# Patient Record
Sex: MALE | Race: WHITE | Employment: UNEMPLOYED | URBAN - METROPOLITAN AREA
[De-identification: names, ages, dates, MRNs, and addresses within clinical notes are randomized per-mention and may not be internally consistent; named-entity substitution may affect disease eponyms.]

---

## 2018-12-16 PROBLEM — E11.00 HYPEROSMOLAR NON-KETOTIC STATE IN PATIENT WITH TYPE 2 DIABETES MELLITUS (HCC): Status: ACTIVE | Noted: 2018-12-16

## 2019-05-05 PROBLEM — E11.65 HYPERGLYCEMIA DUE TO TYPE 2 DIABETES MELLITUS (HCC): Status: ACTIVE | Noted: 2019-05-05

## 2021-11-25 PROBLEM — R11.2 INTRACTABLE NAUSEA AND VOMITING: Status: ACTIVE | Noted: 2021-11-25

## 2021-11-25 PROBLEM — N17.9 AKI (ACUTE KIDNEY INJURY) (HCC): Status: ACTIVE | Noted: 2021-11-25

## 2021-11-25 PROBLEM — E16.2 HYPOGLYCEMIA: Status: ACTIVE | Noted: 2021-11-25

## 2021-11-25 PROBLEM — E87.20 METABOLIC ACIDEMIA: Status: ACTIVE | Noted: 2021-11-25

## 2021-12-24 ENCOUNTER — APPOINTMENT (OUTPATIENT)
Dept: GENERAL RADIOLOGY | Age: 56
DRG: 871 | End: 2021-12-24
Attending: EMERGENCY MEDICINE
Payer: SELF-PAY

## 2021-12-24 ENCOUNTER — APPOINTMENT (OUTPATIENT)
Dept: CT IMAGING | Age: 56
DRG: 871 | End: 2021-12-24
Attending: EMERGENCY MEDICINE
Payer: SELF-PAY

## 2021-12-24 ENCOUNTER — HOSPITAL ENCOUNTER (INPATIENT)
Age: 56
LOS: 4 days | Discharge: ANOTHER HOSPITAL | DRG: 871 | End: 2021-12-28
Attending: EMERGENCY MEDICINE | Admitting: EMERGENCY MEDICINE
Payer: SELF-PAY

## 2021-12-24 DIAGNOSIS — R41.82 ALTERED MENTAL STATUS, UNSPECIFIED ALTERED MENTAL STATUS TYPE: Primary | ICD-10-CM

## 2021-12-24 DIAGNOSIS — N18.6 ESRD ON DIALYSIS (HCC): ICD-10-CM

## 2021-12-24 DIAGNOSIS — E16.2 HYPOGLYCEMIA: ICD-10-CM

## 2021-12-24 DIAGNOSIS — R11.2 NAUSEA AND VOMITING IN ADULT PATIENT: ICD-10-CM

## 2021-12-24 DIAGNOSIS — Z99.2 ESRD ON DIALYSIS (HCC): ICD-10-CM

## 2021-12-24 PROBLEM — G93.41 ACUTE METABOLIC ENCEPHALOPATHY: Status: ACTIVE | Noted: 2021-12-24

## 2021-12-24 LAB
ALBUMIN SERPL-MCNC: 3.6 G/DL (ref 3.4–5)
ALBUMIN/GLOB SERPL: 1.2 {RATIO} (ref 0.8–1.7)
ALP SERPL-CCNC: 60 U/L (ref 45–117)
ALT SERPL-CCNC: 15 U/L (ref 16–61)
AMPHET UR QL SCN: NEGATIVE
AMPHET UR QL SCN: NEGATIVE
ANION GAP SERPL CALC-SCNC: 7 MMOL/L (ref 3–18)
APPEARANCE UR: CLEAR
ARTERIAL PATENCY WRIST A: POSITIVE
AST SERPL-CCNC: 11 U/L (ref 10–38)
ATRIAL RATE: 0 BPM
BARBITURATES UR QL SCN: NEGATIVE
BARBITURATES UR QL SCN: NEGATIVE
BASE EXCESS BLD CALC-SCNC: 3.7 MMOL/L
BASOPHILS # BLD: 0 K/UL (ref 0–0.1)
BASOPHILS NFR BLD: 1 % (ref 0–2)
BDY SITE: ABNORMAL
BENZODIAZ UR QL: NEGATIVE
BENZODIAZ UR QL: NEGATIVE
BILIRUB SERPL-MCNC: 0.5 MG/DL (ref 0.2–1)
BILIRUB UR QL: NEGATIVE
BUN SERPL-MCNC: 33 MG/DL (ref 7–18)
BUN/CREAT SERPL: 7 (ref 12–20)
CALCIUM SERPL-MCNC: 8.2 MG/DL (ref 8.5–10.1)
CALCULATED R AXIS, ECG10: 0 DEGREES
CALCULATED T AXIS, ECG11: 0 DEGREES
CANNABINOIDS UR QL SCN: NEGATIVE
CANNABINOIDS UR QL SCN: NEGATIVE
CHLORIDE SERPL-SCNC: 98 MMOL/L (ref 100–111)
CK SERPL-CCNC: 34 U/L (ref 39–308)
CO2 SERPL-SCNC: 30 MMOL/L (ref 21–32)
COCAINE UR QL SCN: NEGATIVE
COCAINE UR QL SCN: NEGATIVE
COLOR UR: YELLOW
COVID-19 RAPID TEST, COVR: NOT DETECTED
CREAT SERPL-MCNC: 4.61 MG/DL (ref 0.6–1.3)
DIAGNOSIS, 93000: NORMAL
DIFFERENTIAL METHOD BLD: ABNORMAL
EOSINOPHIL # BLD: 0.1 K/UL (ref 0–0.4)
EOSINOPHIL NFR BLD: 1 % (ref 0–5)
EPITH CASTS URNS QL MICRO: NORMAL /LPF (ref 0–5)
ERYTHROCYTE [DISTWIDTH] IN BLOOD BY AUTOMATED COUNT: 15.2 % (ref 11.6–14.5)
GAS FLOW.O2 O2 DELIVERY SYS: ABNORMAL L/MIN
GAS FLOW.O2 SETTING OXYMISER: 18 BPM
GLOBULIN SER CALC-MCNC: 3.1 G/DL (ref 2–4)
GLUCOSE BLD STRIP.AUTO-MCNC: 130 MG/DL (ref 70–110)
GLUCOSE SERPL-MCNC: 149 MG/DL (ref 74–99)
GLUCOSE UR STRIP.AUTO-MCNC: 250 MG/DL
HCO3 BLD-SCNC: 28.3 MMOL/L (ref 22–26)
HCT VFR BLD AUTO: 32.2 % (ref 36–48)
HDSCOM,HDSCOM: NORMAL
HDSCOM,HDSCOM: NORMAL
HGB BLD-MCNC: 10.6 G/DL (ref 13–16)
HGB UR QL STRIP: NEGATIVE
IMM GRANULOCYTES # BLD AUTO: 0.1 K/UL (ref 0–0.04)
IMM GRANULOCYTES NFR BLD AUTO: 1 % (ref 0–0.5)
KETONES UR QL STRIP.AUTO: NEGATIVE MG/DL
LACTATE BLD-SCNC: 1.41 MMOL/L (ref 0.4–2)
LACTATE BLD-SCNC: 1.66 MMOL/L (ref 0.4–2)
LEUKOCYTE ESTERASE UR QL STRIP.AUTO: NEGATIVE
LYMPHOCYTES # BLD: 0.8 K/UL (ref 0.9–3.6)
LYMPHOCYTES NFR BLD: 9 % (ref 21–52)
MCH RBC QN AUTO: 30.7 PG (ref 24–34)
MCHC RBC AUTO-ENTMCNC: 32.9 G/DL (ref 31–37)
MCV RBC AUTO: 93.3 FL (ref 78–100)
METHADONE UR QL: NEGATIVE
METHADONE UR QL: NEGATIVE
MONOCYTES # BLD: 0.9 K/UL (ref 0.05–1.2)
MONOCYTES NFR BLD: 11 % (ref 3–10)
NEUTS SEG # BLD: 7 K/UL (ref 1.8–8)
NEUTS SEG NFR BLD: 79 % (ref 40–73)
NITRITE UR QL STRIP.AUTO: NEGATIVE
NRBC # BLD: 0 K/UL (ref 0–0.01)
NRBC BLD-RTO: 0 PER 100 WBC
O2/TOTAL GAS SETTING VFR VENT: 21 %
OPIATES UR QL: NEGATIVE
OPIATES UR QL: NEGATIVE
PCO2 BLD: 41.9 MMHG (ref 35–45)
PCP UR QL: NEGATIVE
PCP UR QL: NEGATIVE
PH BLD: 7.44 [PH] (ref 7.35–7.45)
PH UR STRIP: 7.5 [PH] (ref 5–8)
PLATELET # BLD AUTO: 98 K/UL (ref 135–420)
PMV BLD AUTO: 10.5 FL (ref 9.2–11.8)
PO2 BLD: 63 MMHG (ref 80–100)
POTASSIUM SERPL-SCNC: 4 MMOL/L (ref 3.5–5.5)
PROT SERPL-MCNC: 6.7 G/DL (ref 6.4–8.2)
PROT UR STRIP-MCNC: ABNORMAL MG/DL
Q-T INTERVAL, ECG07: 0 MS
QRS DURATION, ECG06: 0 MS
QTC CALCULATION (BEZET), ECG08: 0 MS
RBC # BLD AUTO: 3.45 M/UL (ref 4.35–5.65)
SAO2 % BLD: 92.3 % (ref 92–97)
SERVICE CMNT-IMP: ABNORMAL
SODIUM SERPL-SCNC: 135 MMOL/L (ref 136–145)
SOURCE, COVRS: NORMAL
SP GR UR REFRACTOMETRY: 1.01 (ref 1–1.03)
SPECIMEN TYPE: ABNORMAL
TROPONIN-HIGH SENSITIVITY: 20 NG/L (ref 0–78)
UROBILINOGEN UR QL STRIP.AUTO: 0.2 EU/DL (ref 0.2–1)
VENTRICULAR RATE, ECG03: 0 BPM
WBC # BLD AUTO: 8.9 K/UL (ref 4.6–13.2)
WBC URNS QL MICRO: NORMAL /HPF (ref 0–5)

## 2021-12-24 PROCEDURE — 74011250636 HC RX REV CODE- 250/636: Performed by: EMERGENCY MEDICINE

## 2021-12-24 PROCEDURE — 74011250637 HC RX REV CODE- 250/637: Performed by: EMERGENCY MEDICINE

## 2021-12-24 PROCEDURE — 82962 GLUCOSE BLOOD TEST: CPT

## 2021-12-24 PROCEDURE — 87086 URINE CULTURE/COLONY COUNT: CPT

## 2021-12-24 PROCEDURE — 74176 CT ABD & PELVIS W/O CONTRAST: CPT

## 2021-12-24 PROCEDURE — 99284 EMERGENCY DEPT VISIT MOD MDM: CPT

## 2021-12-24 PROCEDURE — 96365 THER/PROPH/DIAG IV INF INIT: CPT

## 2021-12-24 PROCEDURE — 70450 CT HEAD/BRAIN W/O DYE: CPT

## 2021-12-24 PROCEDURE — 87635 SARS-COV-2 COVID-19 AMP PRB: CPT

## 2021-12-24 PROCEDURE — 87040 BLOOD CULTURE FOR BACTERIA: CPT

## 2021-12-24 PROCEDURE — 83605 ASSAY OF LACTIC ACID: CPT

## 2021-12-24 PROCEDURE — 85025 COMPLETE CBC W/AUTO DIFF WBC: CPT

## 2021-12-24 PROCEDURE — 84484 ASSAY OF TROPONIN QUANT: CPT

## 2021-12-24 PROCEDURE — 96376 TX/PRO/DX INJ SAME DRUG ADON: CPT

## 2021-12-24 PROCEDURE — 82803 BLOOD GASES ANY COMBINATION: CPT

## 2021-12-24 PROCEDURE — 74011250636 HC RX REV CODE- 250/636

## 2021-12-24 PROCEDURE — 65660000000 HC RM CCU STEPDOWN

## 2021-12-24 PROCEDURE — 36600 WITHDRAWAL OF ARTERIAL BLOOD: CPT

## 2021-12-24 PROCEDURE — 82550 ASSAY OF CK (CPK): CPT

## 2021-12-24 PROCEDURE — 96374 THER/PROPH/DIAG INJ IV PUSH: CPT

## 2021-12-24 PROCEDURE — 71045 X-RAY EXAM CHEST 1 VIEW: CPT

## 2021-12-24 PROCEDURE — 80053 COMPREHEN METABOLIC PANEL: CPT

## 2021-12-24 PROCEDURE — 80307 DRUG TEST PRSMV CHEM ANLYZR: CPT

## 2021-12-24 PROCEDURE — 51702 INSERT TEMP BLADDER CATH: CPT

## 2021-12-24 PROCEDURE — 80177 DRUG SCRN QUAN LEVETIRACETAM: CPT

## 2021-12-24 PROCEDURE — 81001 URINALYSIS AUTO W/SCOPE: CPT

## 2021-12-24 PROCEDURE — 74011000258 HC RX REV CODE- 258: Performed by: EMERGENCY MEDICINE

## 2021-12-24 PROCEDURE — 96375 TX/PRO/DX INJ NEW DRUG ADDON: CPT

## 2021-12-24 PROCEDURE — 99223 1ST HOSP IP/OBS HIGH 75: CPT | Performed by: EMERGENCY MEDICINE

## 2021-12-24 PROCEDURE — APPSS60 APP SPLIT SHARED TIME 46-60 MINUTES: Performed by: NURSE PRACTITIONER

## 2021-12-24 RX ORDER — CEFEPIME HYDROCHLORIDE 2 G/1
2 INJECTION, POWDER, FOR SOLUTION INTRAVENOUS EVERY 24 HOURS
Status: DISCONTINUED | OUTPATIENT
Start: 2021-12-24 | End: 2021-12-24

## 2021-12-24 RX ORDER — SODIUM CHLORIDE 0.9 % (FLUSH) 0.9 %
5-40 SYRINGE (ML) INJECTION AS NEEDED
Status: DISCONTINUED | OUTPATIENT
Start: 2021-12-24 | End: 2021-12-28 | Stop reason: HOSPADM

## 2021-12-24 RX ORDER — INSULIN GLARGINE 100 [IU]/ML
10 INJECTION, SOLUTION SUBCUTANEOUS
Status: DISCONTINUED | OUTPATIENT
Start: 2021-12-24 | End: 2021-12-24

## 2021-12-24 RX ORDER — ACETAMINOPHEN 650 MG/1
650 SUPPOSITORY RECTAL
Status: DISCONTINUED | OUTPATIENT
Start: 2021-12-24 | End: 2021-12-28 | Stop reason: HOSPADM

## 2021-12-24 RX ORDER — POLYETHYLENE GLYCOL 3350 17 G/17G
17 POWDER, FOR SOLUTION ORAL DAILY PRN
Status: DISCONTINUED | OUTPATIENT
Start: 2021-12-24 | End: 2021-12-28 | Stop reason: HOSPADM

## 2021-12-24 RX ORDER — ONDANSETRON 2 MG/ML
INJECTION INTRAMUSCULAR; INTRAVENOUS
Status: COMPLETED
Start: 2021-12-24 | End: 2021-12-24

## 2021-12-24 RX ORDER — FAMOTIDINE 20 MG/1
20 TABLET, FILM COATED ORAL DAILY
Status: DISCONTINUED | OUTPATIENT
Start: 2021-12-25 | End: 2021-12-28 | Stop reason: HOSPADM

## 2021-12-24 RX ORDER — ONDANSETRON 4 MG/1
4 TABLET, ORALLY DISINTEGRATING ORAL
Status: DISCONTINUED | OUTPATIENT
Start: 2021-12-24 | End: 2021-12-28 | Stop reason: HOSPADM

## 2021-12-24 RX ORDER — INSULIN LISPRO 100 [IU]/ML
INJECTION, SOLUTION INTRAVENOUS; SUBCUTANEOUS EVERY 6 HOURS
Status: DISCONTINUED | OUTPATIENT
Start: 2021-12-25 | End: 2021-12-28 | Stop reason: HOSPADM

## 2021-12-24 RX ORDER — LORAZEPAM 2 MG/ML
1 INJECTION INTRAMUSCULAR
Status: COMPLETED | OUTPATIENT
Start: 2021-12-24 | End: 2021-12-24

## 2021-12-24 RX ORDER — ONDANSETRON 2 MG/ML
4 INJECTION INTRAMUSCULAR; INTRAVENOUS
Status: DISCONTINUED | OUTPATIENT
Start: 2021-12-24 | End: 2021-12-28 | Stop reason: HOSPADM

## 2021-12-24 RX ORDER — INSULIN GLARGINE 100 [IU]/ML
8 INJECTION, SOLUTION SUBCUTANEOUS
Status: DISCONTINUED | OUTPATIENT
Start: 2021-12-24 | End: 2021-12-24

## 2021-12-24 RX ORDER — DEXTROSE 50 % IN WATER (D50W) INTRAVENOUS SYRINGE
25-50 AS NEEDED
Status: DISCONTINUED | OUTPATIENT
Start: 2021-12-24 | End: 2021-12-28 | Stop reason: HOSPADM

## 2021-12-24 RX ORDER — SODIUM CHLORIDE 0.9 % (FLUSH) 0.9 %
5-10 SYRINGE (ML) INJECTION AS NEEDED
Status: DISCONTINUED | OUTPATIENT
Start: 2021-12-24 | End: 2021-12-28 | Stop reason: HOSPADM

## 2021-12-24 RX ORDER — LEVOFLOXACIN 5 MG/ML
750 INJECTION, SOLUTION INTRAVENOUS EVERY 24 HOURS
Status: DISCONTINUED | OUTPATIENT
Start: 2021-12-24 | End: 2021-12-24

## 2021-12-24 RX ORDER — HEPARIN SODIUM 5000 [USP'U]/ML
5000 INJECTION, SOLUTION INTRAVENOUS; SUBCUTANEOUS EVERY 8 HOURS
Status: DISCONTINUED | OUTPATIENT
Start: 2021-12-24 | End: 2021-12-28 | Stop reason: HOSPADM

## 2021-12-24 RX ORDER — INSULIN GLARGINE 100 [IU]/ML
5 INJECTION, SOLUTION SUBCUTANEOUS
Status: DISCONTINUED | OUTPATIENT
Start: 2021-12-25 | End: 2021-12-25

## 2021-12-24 RX ORDER — MAGNESIUM SULFATE 100 %
4 CRYSTALS MISCELLANEOUS AS NEEDED
Status: DISCONTINUED | OUTPATIENT
Start: 2021-12-24 | End: 2021-12-28 | Stop reason: HOSPADM

## 2021-12-24 RX ORDER — INSULIN LISPRO 100 [IU]/ML
INJECTION, SOLUTION INTRAVENOUS; SUBCUTANEOUS
Status: DISCONTINUED | OUTPATIENT
Start: 2021-12-24 | End: 2021-12-24

## 2021-12-24 RX ORDER — SODIUM CHLORIDE 0.9 % (FLUSH) 0.9 %
5-40 SYRINGE (ML) INJECTION EVERY 8 HOURS
Status: DISCONTINUED | OUTPATIENT
Start: 2021-12-24 | End: 2021-12-28 | Stop reason: HOSPADM

## 2021-12-24 RX ORDER — HYDRALAZINE HYDROCHLORIDE 20 MG/ML
10 INJECTION INTRAMUSCULAR; INTRAVENOUS
Status: DISCONTINUED | OUTPATIENT
Start: 2021-12-24 | End: 2021-12-28 | Stop reason: HOSPADM

## 2021-12-24 RX ORDER — VANCOMYCIN 1.75 GRAM/500 ML IN 0.9 % SODIUM CHLORIDE INTRAVENOUS
1750 ONCE
Status: COMPLETED | OUTPATIENT
Start: 2021-12-24 | End: 2021-12-25

## 2021-12-24 RX ORDER — INSULIN GLARGINE 100 [IU]/ML
12 INJECTION, SOLUTION SUBCUTANEOUS
Status: DISCONTINUED | OUTPATIENT
Start: 2021-12-24 | End: 2021-12-24

## 2021-12-24 RX ORDER — ACETAMINOPHEN 325 MG/1
650 TABLET ORAL
Status: DISCONTINUED | OUTPATIENT
Start: 2021-12-24 | End: 2021-12-28 | Stop reason: HOSPADM

## 2021-12-24 RX ADMIN — ACETAMINOPHEN 975 MG: 325 SUPPOSITORY RECTAL at 18:35

## 2021-12-24 RX ADMIN — LEVETIRACETAM 500 MG: 100 INJECTION, SOLUTION INTRAVENOUS at 15:34

## 2021-12-24 RX ADMIN — CEFEPIME HYDROCHLORIDE 2 G: 2 INJECTION, POWDER, FOR SOLUTION INTRAVENOUS at 18:11

## 2021-12-24 RX ADMIN — LORAZEPAM 1 MG: 2 INJECTION INTRAMUSCULAR; INTRAVENOUS at 16:47

## 2021-12-24 RX ADMIN — ONDANSETRON 4 MG: 2 INJECTION INTRAMUSCULAR; INTRAVENOUS at 15:34

## 2021-12-24 RX ADMIN — VANCOMYCIN HYDROCHLORIDE 1750 MG: 10 INJECTION, POWDER, LYOPHILIZED, FOR SOLUTION INTRAVENOUS at 20:24
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
## 2021-12-24 NOTE — ED NOTES
All care/ triage/ assessments/ meds were delayed today due to pt being hard to wrangle/ calm and/or sit still. Pt FINALLY able to finish triage (with assistance and medication).

## 2021-12-24 NOTE — ED TRIAGE NOTES
Pt arrives via EMS from Dialysis, it was reported that pt had low Blood glucose/ nausea/ and vomiting. Accompanying officers also report a change in mental status.

## 2021-12-24 NOTE — ED NOTES
Pt not allowing nurse to perform EKG or start PIV; despite help of police officers that accompanied him. Informed REHANA Galdamez whom was able to start an EJ line. Labs collected and sent.

## 2021-12-25 PROBLEM — N12: Status: ACTIVE | Noted: 2021-12-25

## 2021-12-25 PROBLEM — I10 HYPERTENSION: Status: ACTIVE | Noted: 2021-12-25

## 2021-12-25 PROBLEM — A41.9 SEPSIS (HCC): Status: ACTIVE | Noted: 2021-12-25

## 2021-12-25 PROBLEM — N17.0 ATN (ACUTE TUBULAR NECROSIS) (HCC): Status: ACTIVE | Noted: 2021-12-25

## 2021-12-25 PROBLEM — D69.6 THROMBOCYTOPENIA (HCC): Status: ACTIVE | Noted: 2021-12-25

## 2021-12-25 LAB
ALBUMIN SERPL-MCNC: 3.4 G/DL (ref 3.4–5)
ALBUMIN/GLOB SERPL: 1.1 {RATIO} (ref 0.8–1.7)
ALP SERPL-CCNC: 58 U/L (ref 45–117)
ALT SERPL-CCNC: 11 U/L (ref 16–61)
AMMONIA PLAS-SCNC: 15 UMOL/L (ref 11–32)
ANION GAP SERPL CALC-SCNC: 8 MMOL/L (ref 3–18)
APPEARANCE CSF: CLEAR
APTT PPP: 25.6 SEC (ref 23–36.4)
AST SERPL-CCNC: 20 U/L (ref 10–38)
BASOPHILS # BLD: 0 K/UL (ref 0–0.1)
BASOPHILS NFR BLD: 0 % (ref 0–2)
BILIRUB SERPL-MCNC: 0.6 MG/DL (ref 0.2–1)
BUN SERPL-MCNC: 35 MG/DL (ref 7–18)
BUN/CREAT SERPL: 6 (ref 12–20)
CALCIUM SERPL-MCNC: 8.1 MG/DL (ref 8.5–10.1)
CALCIUM SERPL-MCNC: 8.1 MG/DL (ref 8.5–10.1)
CHLORIDE SERPL-SCNC: 100 MMOL/L (ref 100–111)
CO2 SERPL-SCNC: 27 MMOL/L (ref 21–32)
COLOR CSF: COLORLESS
COLOR SPUN CSF: COLORLESS
CREAT SERPL-MCNC: 5.73 MG/DL (ref 0.6–1.3)
DIFFERENTIAL METHOD BLD: ABNORMAL
EOSINOPHIL # BLD: 0 K/UL (ref 0–0.4)
EOSINOPHIL NFR BLD: 0 % (ref 0–5)
ERYTHROCYTE [DISTWIDTH] IN BLOOD BY AUTOMATED COUNT: 15.7 % (ref 11.6–14.5)
GLOBULIN SER CALC-MCNC: 3.2 G/DL (ref 2–4)
GLUCOSE BLD STRIP.AUTO-MCNC: 110 MG/DL (ref 70–110)
GLUCOSE BLD STRIP.AUTO-MCNC: 169 MG/DL (ref 70–110)
GLUCOSE BLD STRIP.AUTO-MCNC: 59 MG/DL (ref 70–110)
GLUCOSE BLD STRIP.AUTO-MCNC: 63 MG/DL (ref 70–110)
GLUCOSE BLD STRIP.AUTO-MCNC: 83 MG/DL (ref 70–110)
GLUCOSE CSF-MCNC: 53 MG/DL (ref 40–70)
GLUCOSE SERPL-MCNC: 76 MG/DL (ref 74–99)
HBV SURFACE AG SER QL: <0.1 INDEX
HBV SURFACE AG SER QL: NEGATIVE
HCT VFR BLD AUTO: 31.3 % (ref 36–48)
HGB BLD-MCNC: 10.2 G/DL (ref 13–16)
IMM GRANULOCYTES # BLD AUTO: 0 K/UL (ref 0–0.04)
IMM GRANULOCYTES NFR BLD AUTO: 0 % (ref 0–0.5)
INR PPP: 1.1 (ref 0.8–1.2)
LYMPHOCYTES # BLD: 0.8 K/UL (ref 0.9–3.6)
LYMPHOCYTES NFR BLD: 8 % (ref 21–52)
MCH RBC QN AUTO: 30.7 PG (ref 24–34)
MCHC RBC AUTO-ENTMCNC: 32.6 G/DL (ref 31–37)
MCV RBC AUTO: 94.3 FL (ref 78–100)
MONOCYTES # BLD: 0.7 K/UL (ref 0.05–1.2)
MONOCYTES NFR BLD: 7 % (ref 3–10)
NEUTS SEG # BLD: 8.4 K/UL (ref 1.8–8)
NEUTS SEG NFR BLD: 84 % (ref 40–73)
NRBC # BLD: 0 K/UL (ref 0–0.01)
NRBC BLD-RTO: 0 PER 100 WBC
PHOSPHATE SERPL-MCNC: 3.3 MG/DL (ref 2.5–4.9)
PLATELET # BLD AUTO: 108 K/UL (ref 135–420)
PMV BLD AUTO: 10.5 FL (ref 9.2–11.8)
POTASSIUM SERPL-SCNC: 4.6 MMOL/L (ref 3.5–5.5)
PROT CSF-MCNC: 53 MG/DL (ref 15–45)
PROT SERPL-MCNC: 6.6 G/DL (ref 6.4–8.2)
PROTHROMBIN TIME: 14.5 SEC (ref 11.5–15.2)
PTH-INTACT SERPL-MCNC: 126.5 PG/ML (ref 18.4–88)
RBC # BLD AUTO: 3.32 M/UL (ref 4.35–5.65)
RBC # CSF: 15 /CU MM
SODIUM SERPL-SCNC: 135 MMOL/L (ref 136–145)
TSH SERPL DL<=0.05 MIU/L-ACNC: 1.55 UIU/ML (ref 0.36–3.74)
TUBE # CSF: 1
TUBE # CSF: 1
TUBE # CSF: 3
VANCOMYCIN SERPL-MCNC: 30.5 UG/ML (ref 5–40)
WBC # BLD AUTO: 10.1 K/UL (ref 4.6–13.2)
WBC # CSF: 3 /CU MM

## 2021-12-25 PROCEDURE — 74011000258 HC RX REV CODE- 258: Performed by: EMERGENCY MEDICINE

## 2021-12-25 PROCEDURE — 99221 1ST HOSP IP/OBS SF/LOW 40: CPT | Performed by: PSYCHIATRY & NEUROLOGY

## 2021-12-25 PROCEDURE — 99233 SBSQ HOSP IP/OBS HIGH 50: CPT | Performed by: HOSPITALIST

## 2021-12-25 PROCEDURE — 84157 ASSAY OF PROTEIN OTHER: CPT

## 2021-12-25 PROCEDURE — 82962 GLUCOSE BLOOD TEST: CPT

## 2021-12-25 PROCEDURE — 86704 HEP B CORE ANTIBODY TOTAL: CPT

## 2021-12-25 PROCEDURE — 89050 BODY FLUID CELL COUNT: CPT

## 2021-12-25 PROCEDURE — 83970 ASSAY OF PARATHORMONE: CPT

## 2021-12-25 PROCEDURE — 86706 HEP B SURFACE ANTIBODY: CPT

## 2021-12-25 PROCEDURE — 85576 BLOOD PLATELET AGGREGATION: CPT

## 2021-12-25 PROCEDURE — 74011250636 HC RX REV CODE- 250/636: Performed by: EMERGENCY MEDICINE

## 2021-12-25 PROCEDURE — 87483 CNS DNA AMP PROBE TYPE 12-25: CPT

## 2021-12-25 PROCEDURE — 85610 PROTHROMBIN TIME: CPT

## 2021-12-25 PROCEDURE — 74011250637 HC RX REV CODE- 250/637: Performed by: NURSE PRACTITIONER

## 2021-12-25 PROCEDURE — 84100 ASSAY OF PHOSPHORUS: CPT

## 2021-12-25 PROCEDURE — 74011250636 HC RX REV CODE- 250/636: Performed by: NURSE PRACTITIONER

## 2021-12-25 PROCEDURE — 87070 CULTURE OTHR SPECIMN AEROBIC: CPT

## 2021-12-25 PROCEDURE — 85025 COMPLETE CBC W/AUTO DIFF WBC: CPT

## 2021-12-25 PROCEDURE — 87040 BLOOD CULTURE FOR BACTERIA: CPT

## 2021-12-25 PROCEDURE — 85730 THROMBOPLASTIN TIME PARTIAL: CPT

## 2021-12-25 PROCEDURE — 94761 N-INVAS EAR/PLS OXIMETRY MLT: CPT

## 2021-12-25 PROCEDURE — 82140 ASSAY OF AMMONIA: CPT

## 2021-12-25 PROCEDURE — 82945 GLUCOSE OTHER FLUID: CPT

## 2021-12-25 PROCEDURE — 90935 HEMODIALYSIS ONE EVALUATION: CPT

## 2021-12-25 PROCEDURE — 74011000250 HC RX REV CODE- 250: Performed by: NURSE PRACTITIONER

## 2021-12-25 PROCEDURE — 62270 DX LMBR SPI PNXR: CPT | Performed by: PSYCHIATRY & NEUROLOGY

## 2021-12-25 PROCEDURE — 009U3ZX DRAINAGE OF SPINAL CANAL, PERCUTANEOUS APPROACH, DIAGNOSTIC: ICD-10-PCS | Performed by: PSYCHIATRY & NEUROLOGY

## 2021-12-25 PROCEDURE — 74011250636 HC RX REV CODE- 250/636: Performed by: HOSPITALIST

## 2021-12-25 PROCEDURE — 74011000250 HC RX REV CODE- 250: Performed by: HOSPITALIST

## 2021-12-25 PROCEDURE — 87529 HSV DNA AMP PROBE: CPT

## 2021-12-25 PROCEDURE — 84443 ASSAY THYROID STIM HORMONE: CPT

## 2021-12-25 PROCEDURE — 65660000004 HC RM CVT STEPDOWN

## 2021-12-25 PROCEDURE — 80053 COMPREHEN METABOLIC PANEL: CPT

## 2021-12-25 PROCEDURE — 5A1D70Z PERFORMANCE OF URINARY FILTRATION, INTERMITTENT, LESS THAN 6 HOURS PER DAY: ICD-10-PCS | Performed by: INTERNAL MEDICINE

## 2021-12-25 PROCEDURE — 74011000258 HC RX REV CODE- 258: Performed by: NURSE PRACTITIONER

## 2021-12-25 PROCEDURE — 87340 HEPATITIS B SURFACE AG IA: CPT

## 2021-12-25 PROCEDURE — 80202 ASSAY OF VANCOMYCIN: CPT

## 2021-12-25 RX ORDER — INSULIN GLARGINE 100 [IU]/ML
3 INJECTION, SOLUTION SUBCUTANEOUS
Status: DISCONTINUED | OUTPATIENT
Start: 2021-12-25 | End: 2021-12-28 | Stop reason: HOSPADM

## 2021-12-25 RX ORDER — LEVOFLOXACIN 5 MG/ML
750 INJECTION, SOLUTION INTRAVENOUS ONCE
Status: COMPLETED | OUTPATIENT
Start: 2021-12-25 | End: 2021-12-25

## 2021-12-25 RX ORDER — LEVOFLOXACIN 5 MG/ML
500 INJECTION, SOLUTION INTRAVENOUS
Status: DISCONTINUED | OUTPATIENT
Start: 2021-12-27 | End: 2021-12-28

## 2021-12-25 RX ADMIN — DEXTROSE MONOHYDRATE 25 G: 25 INJECTION, SOLUTION INTRAVENOUS at 00:51

## 2021-12-25 RX ADMIN — THIAMINE HYDROCHLORIDE 250 MG: 100 INJECTION, SOLUTION INTRAMUSCULAR; INTRAVENOUS at 00:52

## 2021-12-25 RX ADMIN — Medication 10 ML: at 14:00

## 2021-12-25 RX ADMIN — LEVOFLOXACIN 750 MG: 5 INJECTION, SOLUTION INTRAVENOUS at 17:50

## 2021-12-25 RX ADMIN — Medication 10 ML: at 06:00

## 2021-12-25 RX ADMIN — LEVETIRACETAM 500 MG: 100 INJECTION, SOLUTION INTRAVENOUS at 12:05

## 2021-12-25 RX ADMIN — ACETAMINOPHEN 650 MG: 650 SUPPOSITORY RECTAL at 12:08

## 2021-12-25 RX ADMIN — ACETAMINOPHEN 650 MG: 650 SUPPOSITORY RECTAL at 18:37

## 2021-12-25 RX ADMIN — Medication 10 ML: at 00:56

## 2021-12-25 RX ADMIN — WATER 1 G: 1 INJECTION INTRAMUSCULAR; INTRAVENOUS; SUBCUTANEOUS at 17:50

## 2021-12-25 RX ADMIN — THIAMINE HYDROCHLORIDE 250 MG: 100 INJECTION, SOLUTION INTRAMUSCULAR; INTRAVENOUS at 00:55

## 2021-12-25 NOTE — PROGRESS NOTES
Progress Note    Jame Davidson  49 y.o. Admit Date: 12/24/2021  Active Problems:    Hypoglycemia (11/25/2021) POA: Unknown      EUSEBIA (acute kidney injury) ([address] (11/25/2021) POA: Yes      Acute metabolic encephalopathy (17/41/4270) POA: Unknown      ATN (acute tubular necrosis) ([address] (12/25/2021) POA: Unknown      Interstitial nephritis determined by biopsy (12/25/2021) POA: Unknown      Thrombocytopenia ([address] (12/25/2021) POA: Unknown      Hypertension (12/25/2021) POA: Unknown      Sepsis ([address] (12/25/2021) POA: Unknown            Subjective:     Patient remained Unresponsive, opens eyes & falls sleep, moves  Arms & legs, maintains good urine output through Garcia  . Came from [facility] with Altered Mental status, Could not finish Dialysis. Recently started Dialysis at [facility] with Biopsy Proven ATN with NSAIDS induced Interstitial Nephritis. Was treated with 7 day course of Prednisone to see any response to renal function,so far no respond. Since admission at [facility] received 2 dialysis & tolerated well. On 3rd treatment was Hypoglycemic & Hyperglycemic & dialysis was stopped as he had acute altered mental status & was sent to ER for further evaluation. Work up so far uneventful . Having Fever,Thrombocytopenic & BP is quite high       A comprehensive review of systems was negative except for that written in the History of Present Illness.     Objective:     Visit Vitals  BP (!) 180/150   Pulse 85   Temp (!) 102.8 °F (39.3 °C)   Resp 13   Wt 72.5 kg (159 lb 13.3 oz) Comment: as of 12/15/21   SpO2 90%   BMI 29.23 kg/m²         Intake/Output Summary (Last 24 hours) at 12/25/2021 1252  Last data filed at 12/24/2021 1410  Gross per 24 hour   Intake 100 ml   Output    Net 100 ml       Current Facility-Administered Medications   Medication Dose Route Frequency Provider Last Rate Last Admin    VANCOMYCIN INFORMATION NOTE   Other Rx Dosing/Monitoring Shahla Garcia MD        insulin glargine (LANTUS) injection 3 Units  3 Units SubCUTAneous QHS Mark Garcia MD        sodium chloride (NS) flush 5-10 mL  5-10 mL IntraVENous PRN Roya Ace PA        glucose chewable tablet 16 g  4 Tablet Oral PRN Sasha Kingsley NP        glucagon (GLUCAGEN) injection 1 mg  1 mg IntraMUSCular PRN Sasha Kingsley NP        dextrose (D50W) injection syrg 12.5-25 g  25-50 mL IntraVENous PRN Sasha Kingsley NP   25 g at 12/25/21 0051    levETIRAcetam (KEPPRA) 500 mg in 0.9% sodium chloride (MBP/ADV) 100 mL MBP  500 mg IntraVENous Q12H Sasha Kingsley NP   500 mg at 12/25/21 1205    sodium chloride (NS) flush 5-40 mL  5-40 mL IntraVENous Q8H Anne Marie Kingsley NP   10 mL at 12/25/21 0600    sodium chloride (NS) flush 5-40 mL  5-40 mL IntraVENous PRN Sasha Kingsley NP        acetaminophen (TYLENOL) tablet 650 mg  650 mg Oral Q6H PRN Sasha Kingsley NP        Or    acetaminophen (TYLENOL) suppository 650 mg  650 mg Rectal Q6H PRN Sasha Kingsley NP   650 mg at 12/25/21 1208    polyethylene glycol (MIRALAX) packet 17 g  17 g Oral DAILY PRN Sasha Kingsley NP        ondansetron (ZOFRAN ODT) tablet 4 mg  4 mg Oral Q8H PRN Anne Marie Kingsley NP        Or    ondansetron (ZOFRAN) injection 4 mg  4 mg IntraVENous Q6H PRN Anne Marie Kingsley NP        famotidine (PEPCID) tablet 20 mg  20 mg Oral DAILY Sasha Kingsley NP        [Held by provider] heparin (porcine) injection 5,000 Units  5,000 Units SubCUTAneous Q8H Anne Marie Kingsley NP        cefepime (MAXIPIME) 2 g in sterile water (preservative free) 10 mL IV syringe  2 g IntraVENous Q24H Alton Otero MD        insulin lispro (HUMALOG) injection   SubCUTAneous Q6H Terry Laboy MD        thiamine (B-1) 250 mg in 0.9% sodium chloride 50 mL IVPB  250 mg IntraVENous Q12H Terry Laboy  mL/hr at 12/25/21 0055 250 mg at 12/25/21 0055    hydrALAZINE (APRESOLINE) 20 mg/mL injection 10 mg  10 mg IntraVENous Q6H PRN Jeff Castro MD         Current Outpatient Medications   Medication Sig Dispense Refill    atorvastatin (LIPITOR) 10 mg tablet Take 1 Tablet by mouth daily. 30 Tablet 0    insulin glargine (Semglee U-100 Insulin) 100 unit/mL injection 20 Units by SubCUTAneous route nightly. 30 mL 0    levETIRAcetam (KEPPRA) 500 mg tablet Take 1 Tablet by mouth two (2) times a day. 60 Tablet 0    amLODIPine (NORVASC) 5 mg tablet Take 1 Tablet by mouth daily. 30 Tablet 0    b complex-vitamin c-folic acid (NEPHROCAPS) 1 mg capsule Take 1 Capsule by mouth daily. 30 Capsule 0    cyanocobalamin (VITAMIN B12) 2,500 mcg sublingual tablet Take 1 Tablet by mouth daily. 30 Tablet 0    epoetin jeff-epbx (RETACRIT) 10,000 unit/mL injection 1 mL by SubCUTAneous route Every Tues, Thur & Sat. Indications: anemia due to kidney failure 3 mL 0    ferrous sulfate 325 mg (65 mg iron) tablet Take 1 Tablet by mouth daily (with breakfast). 30 Tablet 0    folic acid (FOLVITE) 1 mg tablet Take 1 Tablet by mouth daily. 30 Tablet 0    insulin lispro (HUMALOG) 100 unit/mL injection 10 units before breakfast  12 units before lunch  15 units before dinner 30 Each 0    sevelamer carbonate (RENVELA) 800 mg tab tab Take 2 Tablets by mouth three (3) times daily (with meals). Indications: renal osteodystrophy with hyperphosphatemia 180 Tablet 0    thiamine HCL (B-1) 100 mg tablet Take 1 Tablet by mouth daily. 30 Tablet 0    senna-docusate (PERICOLACE) 8.6-50 mg per tablet Take 2 Tablets by mouth nightly. 60 Tablet 0    pantoprazole (PROTONIX) 40 mg tablet Take 1 Tablet by mouth Daily (before breakfast). 30 Tablet 0    bumetanide (BUMEX) 2 mg tablet Take 1 Tablet by mouth daily. 30 Tablet 0    cloNIDine (CATAPRES) 0.3 mg/24 hr 1 Patch by TransDERmal route every seven (7) days.  4 Patch 0        Physical Exam:     Physical Exam:   General:  Sleepy, unresponsive, opens eyes  & fall sleep. appears stated age. Neck: Supple, symmetrical, trachea midline, no adenopathy, thyroid: no enlargement/tenderness/nodules, no carotid bruit and no JVD. Lungs:   Clear to auscultation bilaterally. Heart:  Regular rate and rhythm, S1, S2 normal, no murmur, click, rub or gallop. Abdomen:   Soft, non-tender. Bowel sounds normal. No masses,  No organomegaly. Extremities: Extremities normal, atraumatic, no cyanosis or edema,Has Right IJ TDC   Skin: Skin color, texture, turgor normal. No rashes or lesions         Data Review:    CBC w/Diff    Recent Labs     12/25/21  0716 12/24/21  1450 12/24/21  1450   WBC 10.1  --  8.9   RBC 3.32*  --  3.45*   HGB 10.2*  --  10.6*   HCT 31.3*  --  32.2*   MCV 94.3   < > 93.3   MCH 30.7   < > 30.7   MCHC 32.6   < > 32.9   RDW 15.7*   < > 15.2*    < > = values in this interval not displayed. Recent Labs     12/25/21  0716 12/24/21  1450 12/24/21  1450   MONOS 7  --  11*   EOS 0  --  1   BASOS 0   < > 1   RDW 15.7*   < > 15.2*    < > = values in this interval not displayed.         Comprehensive Metabolic Profile    Recent Labs     12/25/21  0611 12/24/21  1450   * 135*   K 4.6 4.0    98*   CO2 27 30   BUN 35* 33*   CREA 5.73* 4.61*    Recent Labs     12/25/21  0611 12/24/21  1450   CA 8.1*  8.1* 8.2*   PHOS 3.3  --    ALB 3.4 3.6   TP 6.6 6.7   TBILI 0.6 0.5                        Impression:       Active Hospital Problems    Diagnosis Date Noted    ATN (acute tubular necrosis) ([facility] [address] 12/25/2021    Interstitial nephritis determined by biopsy 12/25/2021    Thrombocytopenia ([facility] 12/25/2021    Hypertension 12/25/2021    Sepsis ([facility] 12/25/2021    Acute metabolic encephalopathy 16/21/9413    Hypoglycemia 11/25/2021    EUSEBIA (acute kidney injury) ([facility] [address] [facility] 11/25/2021      In Summary altered mental status in a Diabetic patient who recently started Hemodialysis with Kidney Biopsy Proven ATN & Interstitial Nephritis who was treated with 7 day course of Prednisone for Interstitial Nephritis with No Improvement in Renal function  & requiring Dialysis. Now having Fever  & Relatively low Platelets . Blood & Urine culture sent, on Vancomycin  Must suspect Sepsis. Plan: Will Dialyze him today  For 2 & 1/2 hours in ER   & also send 1 set of Blood culture. Needs to R/O aspiration Pneumonia,order chest X-ray ,may need [place] Antibiotics coverage,holding Heparin for Thrombocytopenia.   Will follow      Magdiel Duran MD
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
## 2021-12-25 NOTE — PROGRESS NOTES
OT orders received and medical chart review completed. Pt not appropriate d/t level of lethargy, 2 police officers present report pt has been asleep for hours. OT to follow.
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
## 2021-12-25 NOTE — PROGRESS NOTES
Reason for Renal Dosing:  Per Renal Dosing Policy    Patient clinical status and labs ordered/reviewed. Pt Weight Weight: 72.5 kg (159 lb 13.3 oz) (as of 12/15/21)   Serum Creatinine Lab Results   Component Value Date/Time    Creatinine 4.61 (H) 12/24/2021 02:50 PM       Creatinine Clearance Estimated Creatinine Clearance: 16 mL/min (A) (based on SCr of 4.61 mg/dL (H)). BUN Lab Results   Component Value Date/Time    BUN 33 (H) 12/24/2021 02:50 PM       WBC Lab Results   Component Value Date/Time    WBC 8.9 12/24/2021 02:50 PM      Temperature Temp: (!) 100.5 °F (38.1 °C)     HR Pulse (Heart Rate): 74     BP BP: (!) 169/89           Drug type: Antibiotic indicated for sepsis      Drug/dose: for naïve patient was initiated at: 2g q24h per CrCl 11-29 mL/min    Continue to monitor.     Signed SHIRIN Ventura  Date 12/24/2021  Time 5:54 PM

## 2021-12-25 NOTE — DIALYSIS
ACUTE HEMODIALYSIS FLOW SHEET    HEMODIALYSIS ORDERS: Physician: Dr. Daniels Stain: Manuela   Duration: 2.5 hr   BFR: 350   DFR: 600   Dialysate:  Temp 36-37*C   K+  2    Ca+ 2.5   Na 138   Bicarb 35   Wt Readings from Last 1 Encounters:   12/24/21 72.5 kg (159 lb 13.3 oz)    Patient Chart [x]   Unable to Obtain []  Dry weight/UF Goal: 2000ml    Heparin []  Bolus    Units    [] Hourly    Units    [x]None       Pre BP: 166/99  Pulse: 82  Respirations: 13 Temp: 102.7  [] Oral  [x] Ax  [] Esoph   Labs: []  Pre  []  Post:   [x] N/A   Additional Orders (medications, blood products, hypotension management): [x] Yes   [] No     [x]  [facility] Consent Verified     CATHETER ACCESS:  []N/A   [x]Right   []Left   []IJ   []Fem  [x]Chest wall  []TransHepatic   [] First use X-ray verified     [x]Tunnel    [] Non Tunneled   [x]No S/S infection  []Redness  []Drainage []Cultured []Swelling []Pain   [x]Medical Aseptic Prep Utilized   []Dressing Changed  [] Biopatch  Date: 12/20/21   []Clotted   [x]Patent   Flows: [x]Good  []Poor  []Reversed   If access problem,  notified: []Yes    [x]N/A        GRAFT/FISTULA ACCESS:   [x]N/A     []Right     []Left     []UE     []LE                   GENERAL ASSESSMENT:    LUNGS:  Resp Rate 13   [] Clear  [x] Coarse  [] Crackles  [] Wheezing  [] Diminished                                                           [x] RLL   [x] LLL  [] RUL   [] GIANCARLO            Respirations:  [x]Easy  []Labored  []N/A  Cough:  []Productive  []Dry  []N/A               Therapy:  [x]RA   [] Ventilated   [] Intubated   [] Trach            O2 Device:  [] NC   [] NRB  [] Trach Mask  [] BiPaP  Flow:   l/min                                                    CARDIAC: [x] Regular      [] Irregular   [] Rhythm: NSR          [x] Monitored   [x] Bedside   [] Remotely monitored       EDEMA: [x] RLE   []Generalized  [x] Pitting [] 1+   [x] 2 +   [] 3+    [] 4+        SKIN:   [] Hot     [] Cold    [x] Warm   [x] Dry [] Diaphoretic                 [] Flushed  [] Jaundiced  [] Cyanotic  [] Pale      LOC:    [] Alert      []Oriented:    [] Person     [] Place   []Time               [] Confused  [] Lethargic  [] Medicated  [x] Non-responsive  [] Non-Verbal     GI / ABDOMEN:                     [] Flat    [] Distended    [x] Soft    [] Firm   []  Obese                   [] Diarrhea   [] FMS [x] Bowel Sounds  [] Nausea  [] Vomiting                   [] NGT  [] OGT  [] PEG  [] Tube Feedings @     mL/hr     / URINE ASSESSMENT:                   [] Voiding    [x] Oliguria  [] Anuria                     []  Garcia   [] Incontinent  []  Incontinent Brief   []  PureWick     PAIN:  [x] 0 []1  []2   []3   []4   []5   []6   []7   []8   []9   []10                MOBILITY:  [x] Bed    [] Stretcher      All Vitals and Treatment Details on [address]: [facility]          Room # DO92/22    [x] Routine         [] 1st Time Acute/Chronic   [] Urgent      [] Stat            [] Acute Room   []  Bedside    [] ICU/CCU     [x] ER     Isolation Precautions:  [x] Dialysis    There are currently no Active Isolations     ALLERGIES:     No Known Allergies     Code Status:  Full Code     Hepatitis Status      Lab Results   Component Value Date/Time    Hepatitis A, IgM NON-REACTIVE 04/04/2019 09:22 AM    Hepatitis B surface Ag <0.10 12/25/2021 07:16 AM    Hepatitis B surface Ab <3 12/08/2021 01:46 AM    Hep B Core Ab,Total Nonreactive 12/08/2021 01:46 AM    Hepatitis B core, IgM NON-REACTIVE 04/04/2019 09:22 AM    Hepatitis C virus Ab NON-REACTIVE 04/04/2019 09:22 AM        Current Labs:      Lab Results   Component Value Date/Time    WBC 10.1 12/25/2021 07:16 AM    HGB 10.2 (L) 12/25/2021 07:16 AM    HCT 31.3 (L) 12/25/2021 07:16 AM    PLATELET 627 (L) 57/59/2591 07:16 AM    MCV 94.3 12/25/2021 07:16 AM     Lab Results   Component Value Date/Time    Sodium 135 (L) 12/25/2021 06:11 AM    Potassium 4.6 12/25/2021 06:11 AM    Chloride 100 12/25/2021 06:11 AM    CO2 27 12/25/2021 06:11 AM    Anion gap 8 12/25/2021 06:11 AM    Glucose 76 12/25/2021 06:11 AM    BUN 35 (H) 12/25/2021 06:11 AM    Creatinine 5.73 (H) 12/25/2021 06:11 AM    BUN/Creatinine ratio 6 (L) 12/25/2021 06:11 AM    GFR est AA 13 (L) 12/25/2021 06:11 AM    GFR est non-AA 10 (L) 12/25/2021 06:11 AM    Calcium 8.1 (L) 12/25/2021 06:11 AM    Calcium 8.1 (L) 12/25/2021 06:11 AM          DIET:  DIET NPO     PRIMARY NURSE REPORT:   Pre Dialysis: Raphael Sam RN    Time: 1345      EDUCATION:    [x] Patient           Knowledge Basis: []None []Minimal [] Substantial [x] Unknown  Barriers to learning  []None  [] Intubated/Trached/Ventilated  [x] Sedated/Paralyzed   [] Access Care     [] S&S of infection  [] Fluid Management  [] K+   [x] Procedural    [] Medications   [] Tx Options   [] Transplant   [] Diet      Teaching Tools:  [] Explain  [] Demo  [] Handouts [] Video  Patient response: [] Verbalized understanding   [] Requires follow up        [x] Time Out/Safety Check    [x] Extracorporeal Circuit Tested for integrity       RO/HEMODIALYSIS MACHINE SAFETY CHECKS  Before each treatment:        [address] [place] [address]                                     [x] Unit Machine # 1 with centralized RO                                                                                                                           Alarm Test:  Pass time 1340            [x] RO/Machine Log Complete    Machine Temp    36-37*C             Dialysate: pH  7.4    Conductivity: Meter 14.0    HD Machine  14.0     TCD: 13.8  Dialyzer Lot # [id number]     Blood Tubing Lot # [id number]     Saline Lot # [id number]     CHLORINE TESTING-Before each treatment and every 4 hours    Total Chlorine: [x] less than 0.1 ppm  Initial Time Check: 1435       4 Hr/2nd Check Time: N/A   (if greater than 0.1 ppm from Primary then every 30 minutes from Secondary)     TREATMENT INITIATION  with Dialysis Precautions:   [x] All Connections Secured [x] Saline Line Double Clamped   [x] Venous Parameters Set               [x] Arterial Parameters Set    [x] Prime Given 250ml NSS              [x]Air Foam Detector Engaged        Treatment Initiation Note:  1430--RN arrived at Pt bedside. Pt is lethergic, moves extremities, and opens eyes to verbal command. On room air. Garcia in place with clear yellow urine. Police officers and a sitter is also at [employer]. Pt is hancuffed on the left hand to the bed. Primary RN in room. RN stated Pt was given ativan yesterday for agitation and he has been somnolent  since then. Pt has a temp over 100.2. Blood cultures drawn from CVC per order. 1445--HD initiated without difficulty. During Treatment Notes:  1500  Face & Vascular access visible with art and aisha line connections intact. Pt tolerating dialysis. 1515  Face & Vascular access visible with art and aisha line connections intact. Pt tolerating dialysis. 1530  Face & Vascular access visible with art and aisha line connections intact. Pt tolerating dialysis. 1545  Face & Vascular access visible with art and aisha line connections intact. Pt tolerating dialysis. 1600  Face & Vascular access visible with art and aisha line connections intact. Pt tolerating dialysis. 1615  Face & Vascular access visible with art and aisha line connections intact. Pt tolerating dialysis. 1630  Face & Vascular access visible with art and aisha line connections intact. Pt tolerating dialysis. 1645  Face & Vascular access visible with art and aisha line connections intact. Pt tolerating dialysis. 1700  Face & Vascular access visible with art and aisha line connections intact. Pt tolerating dialysis. 1715  Dialysis treatment complete.        Medication    Dose    Volume Route      Time       Geoff Nurse   none   GERARDO Christensen RN HD Jay Barley, RN     Post Assessment  Dialyzer Cleared:   [] Good  [] Fair  [] Poor  Blood processed:  48.6 L  UF Removed:  2000 Ml    Post BP: 177/77  Pulse: 66  Respirations: 13   Temp: 102.6  [] rectal  [] Ax  [] Esophageal   Lungs: [] Clear                [x] No change from initial assessment   Post Tx Vascular Access: [x] N/A   Cardiac:  [x] Regular   [] Irregular   Rhythm:  [x] Monitored   [] Not Monitored    CVC Catheter: [] N/A  Locking solution: Heparin 1:1000 U  Arterial port 1.7 ml   Venous port 1.7 ml   Edema:  [x] None  [x] RLE                    Skin:[x] Warm  [x] Dry [] Diaphoretic               [] Flushed  [] Pale [] Cyanotic Pain:  [x]0  []1-2  []3-4  []5-6   []7-8  []9-10         Post Treatment Note:   Pt tolerated dialysis well. Dialysis catheter intact, patent and heplocked as noted above. Temp still 102. 6. Primary RN made aware.      POST TREATMENT PRIMARY NURSE HANDOFF REPORT:   Post Dialysis: Huber Rangel RN        Time:  0972       Abbreviations: AVG-arterial venous graft, AVF-arterial venous fistula, IJ-Internal Jugular, Subcl-Subclavian, Fem-Femoral, Tx-treatment, AP/HR-apical heart rate, VSS- Vital Signs Stable, CVC- Central Venous Catheter, DFR-dialysate flow rate, BFR-blood flow rate, AP-arterial pressure, -venous pressure, UF-ultrafiltrate, TMP-transmembrane pressure, Garrett-Venous, Art-Arterial, RO-Reverse Osmosis

## 2021-12-25 NOTE — PROGRESS NOTES
Hospitalist Progress Note    Patient: Rajat Martinez MRN: [id number]  CSN: [id number]    YOB: 1965  Age: 49 y.o. Sex: male    DOA: 12/24/2021 LOS:  LOS: 1 day          Not safe for po intake, noted lethargic. Platelet improved to 108. Rectal temp 12 pm 102.8. Patient seen and examined at bedside, opens eyes briefly to sternal rub then goes back to sleep. Not answering questions, not following commands. Assessment/Plan       1. Acute metabolic encephalopathy, now with increased fever. Received ativan yesterday. NH3 wnl. Head CT negative. Neurology consult. LP.  2. ESRD / HD per Dr. Sabrina Davis  3. Fever poa. Broad-spectrum antibiotics, follow cultures including LP, HSV PCR. ID consult. 4. DM1 with hypoglycemia. lantus dose reduced. 5. Seizure d/o  6. Thrombocytopenia. HIT pending. 7. Anemia of ckd  8. Hypertension  9. Dyslipidemia  10. Polysubstance abuse  11. Tiny right apical nodule. Recommend follow-up CT in 12 months if the patient is considered to be at high risk for lung CA. 12. Small left pleural effusion. 13. Recent admission [facility] aspiration pna and shock. 14.  Avoid chemical DVT prophylaxis for 24 hours after LP. 15.  Full code. Return to [facility] when ready. I discussed the case with Dr. Vernetta Cogan, and Dr. Chirag Mirza. Pop Torres. Time spent 45 minutes. Additional Notes:      Case discussed with:  [x]Patient  []Family  [x]Nursing  []Case Management  DVT Prophylaxis:  []Lovenox  []Hep SQ  [x]SCDs  []Coumadin   []On Heparin gtt    Vital signs/Intake and Output:  Visit Vitals  BP (!) 180/150   Pulse 85   Temp (!) 102.8 °F (39.3 °C)   Resp 13   Wt 72.5 kg (159 lb 13.3 oz)   SpO2 90%   BMI 29.23 kg/m²     Current Shift:  No intake/output data recorded. Last three shifts:  12/23 1901 - 12/25 0700  In: 100 [I.V.:100]  Out: -     Patient opens eyes briefly to brisk sternal rub and loud voice, then goes back to sleep. Chronically ill-appearing.   Appears older than his stated age.  Warm to touch. 2 guards at bedside. Normocephalic atraumatic pupils equally round reactive to light. Does not open mouth for oral exam.  Regular rate and rhythm. No murmur  Clear to auscultation bilateral anterior and infraaxillary fields  Abdomen soft nontender nondistended nabs  Extremities no edema. DP 2+ bilaterally  Neuro opens eyes briefly to sternal rub. Not answering questions, not following commands  No rash to visible skin. Toenails elongated. Medications Reviewed      Labs: Results:       Chemistry Recent Labs     12/25/21  0611 12/24/21  1450   GLU 76 149*   * 135*   K 4.6 4.0    98*   CO2 27 30   BUN 35* 33*   CREA 5.73* 4.61*   CA 8.1*  8.1* 8.2*   AGAP 8 7   BUCR 6* 7*   AP 58 60   TP 6.6 6.7   ALB 3.4 3.6   GLOB 3.2 3.1   AGRAT 1.1 1.2      CBC w/Diff Recent Labs     12/25/21  0716 12/24/21  1450   WBC 10.1 8.9   RBC 3.32* 3.45*   HGB 10.2* 10.6*   HCT 31.3* 32.2*   * 98*   GRANS 84* 79*   LYMPH 8* 9*   EOS 0 1      Cardiac Enzymes Recent Labs     12/24/21  1450   CPK 34*      Coagulation Recent Labs     12/25/21  0611   PTP 14.5   INR 1.1   APTT 25.6       Lipid Panel Lab Results   Component Value Date/Time    Cholesterol, total 155 04/04/2019 09:22 AM    HDL Cholesterol 52 04/04/2019 09:22 AM    LDL, calculated 86 04/04/2019 09:22 AM    Triglyceride 87 04/04/2019 09:22 AM    CHOL/HDL Ratio 3.0 04/04/2019 09:22 AM      BNP No results for input(s): BNPP in the last 72 hours.    Liver Enzymes Recent Labs     12/25/21  0611   TP 6.6   ALB 3.4   AP 58      Thyroid Studies Lab Results   Component Value Date/Time    TSH 1.55 12/25/2021 06:11 AM        Procedures/imaging: see electronic medical records for all procedures/Xrays and details which were not copied into this note but were reviewed prior to creation of Plan

## 2021-12-25 NOTE — PROGRESS NOTES
Speech Therapy Note:    SLP orders to evaluate received and attempted. Could not progress with ST intervention due to pt:    [x]  Lethargic, unable to be alerted enough for safe PO intake  []  Refused participation  []  Off the unit  []  NPO for procedure  []  Other:     SLP will f/u later this day or as medically indicated. D/w RN.  Thank you for this referral.     Letitia Wheeler M.Ed., [id number] [facility]  Speech-Language Pathologist

## 2021-12-25 NOTE — PROGRESS NOTES
All events reviewed. His Platelets decreased significantly, will hold Heparin ,Check HIT panel,PT/INR, PTT,follow renal function.

## 2021-12-25 NOTE — PROGRESS NOTES
RENAL PROGRESS NOTE: Pt seen on dialysis. Follow up of ARF/ATN/ ? ESRD    Present on Admission:   EUSEBIA (acute kidney injury) ([facility] [address]         Subjective:   Seen During Dialysis  Patient is on Dialysis. ,sleepy. Earlier undergone Spinal Tap.,having Fever    Objective:    Patient Vitals for the past 12 hrs:   Temp Pulse Resp BP SpO2   12/25/21 1515  75 13 (!) 168/90    12/25/21 1500  70 14 (!) 145/60    12/25/21 1445  85  (!) 170/98    12/25/21 1430 (!) 102.7 °F (39.3 °C) 82 13 (!) 166/99    12/25/21 1348 (!) 102.3 °F (39.1 °C) 86 13 (!) 173/90    12/25/21 1200 (!) 102.8 °F (39.3 °C)       12/25/21 0703  85 13  90 %   12/25/21 0633  85 11 (!) 180/150 91 %   12/25/21 0603  87 12 (!) 177/98 90 %   12/25/21 0533  83 11 (!) 184/97 91 %   12/25/21 0503  84 12 (!) 180/98 90 %   12/25/21 0433  83 11 (!) 179/102 91 %   12/25/21 0403  81 12 (!) 185/97 92 %      No intake or output data in the 24 hours ending 12/25/21 1534    Physical Assessment:     General Appearance: slepy, nonCommunicating  Lung: clear to auscultation  Heart: regular rate and rhythm and no murmurs, clicks or gallops  Lower Extremities: no: edema   Access: Right IJ TDC,tolerating Blood flow 350. On 2 K,2.5 ca bath. Not using any Heparin. Labs    CBC w/Diff    Recent Labs     12/25/21  0716 12/24/21  1450 12/24/21  1450   WBC 10.1  --  8.9   RBC 3.32*  --  3.45*   HGB 10.2*  --  10.6*   HCT 31.3*  --  32.2*   MCV 94.3   < > 93.3   MCH 30.7   < > 30.7   MCHC 32.6   < > 32.9   RDW 15.7*   < > 15.2*    < > = values in this interval not displayed. Recent Labs     12/25/21  0716 12/24/21  1450 12/24/21  1450   MONOS 7  --  11*   EOS 0  --  1   BASOS 0   < > 1   RDW 15.7*   < > 15.2*    < > = values in this interval not displayed.         Comprehensive Metabolic Profile    Recent Labs     12/25/21  0611 12/24/21  1450   * 135*   K 4.6 4.0    98*   CO2 27 30   BUN 35* 33*   CREA 5.73* 4.61*    Recent Labs     12/25/21  1897 12/24/21  1450   CA 8.1*  8.1* 8.2*   PHOS 3.3  --    ALB 3.4 3.6   TP 6.6 6.7   TBILI 0.6 0.5          Basic Metabolic Profile       results  reviwed. MEDS:Reviwed.   Current Facility-Administered Medications   Medication Dose Route Frequency Provider Last Rate Last Admin    VANCOMYCIN INFORMATION NOTE   Other Rx Dosing/Monitoring Thea Garcia MD        insulin glargine (LANTUS) injection 3 Units  3 Units SubCUTAneous QHS Thea Garcia MD        cefepime (MAXIPIME) 1 g in sterile water (preservative free) 10 mL IV syringe  1 g IntraVENous Q24H Thea Garcia MD        levoFLOXacin (LEVAQUIN) 750 mg in D5W IVPB  750 mg IntraVENous ONCE Thea Garcia MD        [START ON 12/27/2021] levoFLOXacin (LEVAQUIN) 500 mg in D5W IVPB  500 mg IntraVENous Q48H Thea Garcia MD        sodium chloride (NS) flush 5-10 mL  5-10 mL IntraVENous PRN Roya Ace PA        glucose chewable tablet 16 g  4 Tablet Oral PRN Hamilton Kingsley NP        glucagon (GLUCAGEN) injection 1 mg  1 mg IntraMUSCular PRN Hamilton Kingsley NP        dextrose (D50W) injection syrg 12.5-25 g  25-50 mL IntraVENous PRN Hamilton Kingsley NP   25 g at 12/25/21 0051    levETIRAcetam (KEPPRA) 500 mg in 0.9% sodium chloride (MBP/ADV) 100 mL MBP  500 mg IntraVENous Q12H Hamilton Kingsley NP   500 mg at 12/25/21 1205    sodium chloride (NS) flush 5-40 mL  5-40 mL IntraVENous Q8H Anne Marie Kingsley NP   10 mL at 12/25/21 0600    sodium chloride (NS) flush 5-40 mL  5-40 mL IntraVENous PRN Hamilton Kingsley NP        acetaminophen (TYLENOL) tablet 650 mg  650 mg Oral Q6H PRN Hamilton Kingsley NP        Or    acetaminophen (TYLENOL) suppository 650 mg  650 mg Rectal Q6H PRN Hamilton Kingsley NP   650 mg at 12/25/21 1208    polyethylene glycol (MIRALAX) packet 17 g  17 g Oral DAILY PRN Hamilton Kingsley, NYA        ondansetron (ZOFRAN ODT) tablet 4 mg  4 mg Oral Q8H PRN Nathaniel Hogue NP        Or    ondansetron (ZOFRAN) injection 4 mg  4 mg IntraVENous Q6H PRN Anne Marie Kingsley NP        famotidine (PEPCID) tablet 20 mg  20 mg Oral DAILY Fredy Kingsley NP        [Held by provider] heparin (porcine) injection 5,000 Units  5,000 Units SubCUTAneous Q8H Fredy Kingsley NP        insulin lispro (HUMALOG) injection   SubCUTAneous Q6H Terry Laboy MD        thiamine (B-1) 250 mg in 0.9% sodium chloride 50 mL IVPB  250 mg IntraVENous Q12H Terry Laboy  mL/hr at 12/25/21 0055 250 mg at 12/25/21 0055    hydrALAZINE (APRESOLINE) 20 mg/mL injection 10 mg  10 mg IntraVENous Q6H PRN Kadi Vela MD         Current Outpatient Medications   Medication Sig Dispense Refill    atorvastatin (LIPITOR) 10 mg tablet Take 1 Tablet by mouth daily. 30 Tablet 0    insulin glargine (Semglee U-100 Insulin) 100 unit/mL injection 20 Units by SubCUTAneous route nightly. 30 mL 0    levETIRAcetam (KEPPRA) 500 mg tablet Take 1 Tablet by mouth two (2) times a day. 60 Tablet 0    amLODIPine (NORVASC) 5 mg tablet Take 1 Tablet by mouth daily. 30 Tablet 0    b complex-vitamin c-folic acid (NEPHROCAPS) 1 mg capsule Take 1 Capsule by mouth daily. 30 Capsule 0    cyanocobalamin (VITAMIN B12) 2,500 mcg sublingual tablet Take 1 Tablet by mouth daily. 30 Tablet 0    epoetin jeff-epbx (RETACRIT) 10,000 unit/mL injection 1 mL by SubCUTAneous route Every Tues, Thur & Sat. Indications: anemia due to kidney failure 3 mL 0    ferrous sulfate 325 mg (65 mg iron) tablet Take 1 Tablet by mouth daily (with breakfast). 30 Tablet 0    folic acid (FOLVITE) 1 mg tablet Take 1 Tablet by mouth daily. 30 Tablet 0    insulin lispro (HUMALOG) 100 unit/mL injection 10 units before breakfast  12 units before lunch  15 units before dinner 30 Each 0    sevelamer carbonate (RENVELA) 800 mg tab tab Take 2 Tablets by mouth three (3) times daily (with meals). Indications: renal osteodystrophy with hyperphosphatemia 180 Tablet 0    thiamine HCL (B-1) 100 mg tablet Take 1 Tablet by mouth daily. 30 Tablet 0    senna-docusate (PERICOLACE) 8.6-50 mg per tablet Take 2 Tablets by mouth nightly. 60 Tablet 0    pantoprazole (PROTONIX) 40 mg tablet Take 1 Tablet by mouth Daily (before breakfast). 30 Tablet 0    bumetanide (BUMEX) 2 mg tablet Take 1 Tablet by mouth daily. 30 Tablet 0    cloNIDine (CATAPRES) 0.3 mg/24 hr 1 Patch by TransDERmal route every seven (7) days. 4 Patch 0       Impression:    ARF/ATN/?ESRD: Tolerating HD well. Anemia of CKD: on  Epogen. not today  Hyperparathyroidism nottoday  Sepsis : sent Blood culture from Diaysis catheter  Plan  Dialysis for volume and solute management,  UF 2 kg, Continue antibiotics, follow culture Zully Cooper MD

## 2021-12-26 ENCOUNTER — APPOINTMENT (OUTPATIENT)
Dept: MRI IMAGING | Age: 56
DRG: 871 | End: 2021-12-26
Attending: EMERGENCY MEDICINE
Payer: SELF-PAY

## 2021-12-26 ENCOUNTER — APPOINTMENT (OUTPATIENT)
Dept: GENERAL RADIOLOGY | Age: 56
DRG: 871 | End: 2021-12-26
Attending: HOSPITALIST
Payer: SELF-PAY

## 2021-12-26 LAB
ANION GAP SERPL CALC-SCNC: 13 MMOL/L (ref 3–18)
B PERT DNA SPEC QL NAA+PROBE: NOT DETECTED
BACTERIA SPEC CULT: NORMAL
BORDETELLA PARAPERTUSSIS PCR, BORPAR: NOT DETECTED
BUN SERPL-MCNC: 40 MG/DL (ref 7–18)
BUN/CREAT SERPL: 7 (ref 12–20)
C PNEUM DNA SPEC QL NAA+PROBE: NOT DETECTED
CALCIUM SERPL-MCNC: 8.4 MG/DL (ref 8.5–10.1)
CHLORIDE SERPL-SCNC: 98 MMOL/L (ref 100–111)
CO2 SERPL-SCNC: 24 MMOL/L (ref 21–32)
CREAT SERPL-MCNC: 5.41 MG/DL (ref 0.6–1.3)
ERYTHROCYTE [DISTWIDTH] IN BLOOD BY AUTOMATED COUNT: 15.2 % (ref 11.6–14.5)
FLUAV H1 2009 PAND RNA SPEC QL NAA+PROBE: NOT DETECTED
FLUAV H1 RNA SPEC QL NAA+PROBE: NOT DETECTED
FLUAV H3 RNA SPEC QL NAA+PROBE: NOT DETECTED
FLUAV SUBTYP SPEC NAA+PROBE: NOT DETECTED
FLUBV RNA SPEC QL NAA+PROBE: NOT DETECTED
GLUCOSE BLD STRIP.AUTO-MCNC: 195 MG/DL (ref 70–110)
GLUCOSE BLD STRIP.AUTO-MCNC: 215 MG/DL (ref 70–110)
GLUCOSE BLD STRIP.AUTO-MCNC: 219 MG/DL (ref 70–110)
GLUCOSE BLD STRIP.AUTO-MCNC: 280 MG/DL (ref 70–110)
GLUCOSE SERPL-MCNC: 199 MG/DL (ref 74–99)
HADV DNA SPEC QL NAA+PROBE: NOT DETECTED
HCOV 229E RNA SPEC QL NAA+PROBE: NOT DETECTED
HCOV HKU1 RNA SPEC QL NAA+PROBE: NOT DETECTED
HCOV NL63 RNA SPEC QL NAA+PROBE: NOT DETECTED
HCOV OC43 RNA SPEC QL NAA+PROBE: NOT DETECTED
HCT VFR BLD AUTO: 30.6 % (ref 36–48)
HGB BLD-MCNC: 10 G/DL (ref 13–16)
HMPV RNA SPEC QL NAA+PROBE: NOT DETECTED
HPIV1 RNA SPEC QL NAA+PROBE: NOT DETECTED
HPIV2 RNA SPEC QL NAA+PROBE: NOT DETECTED
HPIV3 RNA SPEC QL NAA+PROBE: NOT DETECTED
HPIV4 RNA SPEC QL NAA+PROBE: NOT DETECTED
M PNEUMO DNA SPEC QL NAA+PROBE: NOT DETECTED
MCH RBC QN AUTO: 31.1 PG (ref 24–34)
MCHC RBC AUTO-ENTMCNC: 32.7 G/DL (ref 31–37)
MCV RBC AUTO: 95 FL (ref 78–100)
NRBC # BLD: 0 K/UL (ref 0–0.01)
NRBC BLD-RTO: 0 PER 100 WBC
PLATELET # BLD AUTO: 126 K/UL (ref 135–420)
PMV BLD AUTO: 11.6 FL (ref 9.2–11.8)
POTASSIUM SERPL-SCNC: 3.6 MMOL/L (ref 3.5–5.5)
RBC # BLD AUTO: 3.22 M/UL (ref 4.35–5.65)
RSV RNA SPEC QL NAA+PROBE: NOT DETECTED
RV+EV RNA SPEC QL NAA+PROBE: NOT DETECTED
SARS-COV-2 PCR, COVPCR: NOT DETECTED
SERVICE CMNT-IMP: NORMAL
SODIUM SERPL-SCNC: 135 MMOL/L (ref 136–145)
WBC # BLD AUTO: 10.8 K/UL (ref 4.6–13.2)

## 2021-12-26 PROCEDURE — 99232 SBSQ HOSP IP/OBS MODERATE 35: CPT | Performed by: HOSPITALIST

## 2021-12-26 PROCEDURE — 0202U NFCT DS 22 TRGT SARS-COV-2: CPT

## 2021-12-26 PROCEDURE — 74011000258 HC RX REV CODE- 258: Performed by: HOSPITALIST

## 2021-12-26 PROCEDURE — 95816 EEG AWAKE AND DROWSY: CPT | Performed by: PSYCHIATRY & NEUROLOGY

## 2021-12-26 PROCEDURE — 74011250636 HC RX REV CODE- 250/636: Performed by: EMERGENCY MEDICINE

## 2021-12-26 PROCEDURE — 85027 COMPLETE CBC AUTOMATED: CPT

## 2021-12-26 PROCEDURE — 74011636637 HC RX REV CODE- 636/637: Performed by: HOSPITALIST

## 2021-12-26 PROCEDURE — 65660000000 HC RM CCU STEPDOWN

## 2021-12-26 PROCEDURE — 87389 HIV-1 AG W/HIV-1&-2 AB AG IA: CPT

## 2021-12-26 PROCEDURE — 82962 GLUCOSE BLOOD TEST: CPT

## 2021-12-26 PROCEDURE — 36415 COLL VENOUS BLD VENIPUNCTURE: CPT

## 2021-12-26 PROCEDURE — 99232 SBSQ HOSP IP/OBS MODERATE 35: CPT | Performed by: PSYCHIATRY & NEUROLOGY

## 2021-12-26 PROCEDURE — 74011250637 HC RX REV CODE- 250/637: Performed by: NURSE PRACTITIONER

## 2021-12-26 PROCEDURE — 70030 X-RAY EYE FOR FOREIGN BODY: CPT

## 2021-12-26 PROCEDURE — 93005 ELECTROCARDIOGRAM TRACING: CPT

## 2021-12-26 PROCEDURE — 70551 MRI BRAIN STEM W/O DYE: CPT

## 2021-12-26 PROCEDURE — 74011250636 HC RX REV CODE- 250/636: Performed by: NURSE PRACTITIONER

## 2021-12-26 PROCEDURE — 62270 DX LMBR SPI PNXR: CPT | Performed by: PSYCHIATRY & NEUROLOGY

## 2021-12-26 PROCEDURE — 74011636637 HC RX REV CODE- 636/637: Performed by: EMERGENCY MEDICINE

## 2021-12-26 PROCEDURE — 74011000258 HC RX REV CODE- 258: Performed by: EMERGENCY MEDICINE

## 2021-12-26 PROCEDURE — 74011250636 HC RX REV CODE- 250/636: Performed by: HOSPITALIST

## 2021-12-26 PROCEDURE — 74011000250 HC RX REV CODE- 250: Performed by: HOSPITALIST

## 2021-12-26 PROCEDURE — 80048 BASIC METABOLIC PNL TOTAL CA: CPT

## 2021-12-26 PROCEDURE — 74011000258 HC RX REV CODE- 258: Performed by: NURSE PRACTITIONER

## 2021-12-26 PROCEDURE — C9254 INJECTION, LACOSAMIDE: HCPCS | Performed by: HOSPITALIST

## 2021-12-26 PROCEDURE — 2709999900 HC NON-CHARGEABLE SUPPLY

## 2021-12-26 RX ADMIN — THIAMINE HYDROCHLORIDE 250 MG: 100 INJECTION, SOLUTION INTRAMUSCULAR; INTRAVENOUS at 23:52

## 2021-12-26 RX ADMIN — Medication 4 UNITS: at 06:14

## 2021-12-26 RX ADMIN — WATER 1 G: 1 INJECTION INTRAMUSCULAR; INTRAVENOUS; SUBCUTANEOUS at 17:50

## 2021-12-26 RX ADMIN — Medication 10 ML: at 06:15

## 2021-12-26 RX ADMIN — LEVETIRACETAM 500 MG: 100 INJECTION, SOLUTION INTRAVENOUS at 00:38

## 2021-12-26 RX ADMIN — Medication 4 UNITS: at 17:49

## 2021-12-26 RX ADMIN — HYDRALAZINE HYDROCHLORIDE 10 MG: 20 INJECTION INTRAMUSCULAR; INTRAVENOUS at 09:49

## 2021-12-26 RX ADMIN — ACYCLOVIR SODIUM 250 MG: 500 INJECTION, SOLUTION INTRAVENOUS at 13:12

## 2021-12-26 RX ADMIN — THIAMINE HYDROCHLORIDE 250 MG: 100 INJECTION, SOLUTION INTRAMUSCULAR; INTRAVENOUS at 09:47

## 2021-12-26 RX ADMIN — SODIUM CHLORIDE 100 MG: 9 INJECTION, SOLUTION INTRAVENOUS at 14:46

## 2021-12-26 RX ADMIN — LEVETIRACETAM 500 MG: 100 INJECTION, SOLUTION INTRAVENOUS at 12:26

## 2021-12-26 RX ADMIN — ACETAMINOPHEN 650 MG: 650 SUPPOSITORY RECTAL at 03:13

## 2021-12-26 RX ADMIN — Medication 10 ML: at 21:16

## 2021-12-26 RX ADMIN — INSULIN GLARGINE 3 UNITS: 100 INJECTION, SOLUTION SUBCUTANEOUS at 21:14

## 2021-12-26 RX ADMIN — Medication 6 UNITS: at 13:12

## 2021-12-26 RX ADMIN — HYDRALAZINE HYDROCHLORIDE 10 MG: 20 INJECTION INTRAMUSCULAR; INTRAVENOUS at 04:48

## 2021-12-26 RX ADMIN — Medication 10 ML: at 13:13

## 2021-12-26 RX ADMIN — HEPARIN SODIUM 5000 UNITS: 5000 INJECTION INTRAVENOUS; SUBCUTANEOUS at 21:14

## 2021-12-26 NOTE — PROGRESS NOTES
Neurology Progress Note    Patient ID:  Rosmery Govea  [id number]  61 y.o.  1965    Subjective:      Mr Jill Rahman remains minimally responsive despite empiric antibiotics and dialysis yesterday. CSF was fairly unremarkable    Current Facility-Administered Medications   Medication Dose Route Frequency    VANCOMYCIN INFORMATION NOTE   Other Rx Dosing/Monitoring    insulin glargine (LANTUS) injection 3 Units  3 Units SubCUTAneous QHS    cefepime (MAXIPIME) 1 g in sterile water (preservative free) 10 mL IV syringe  1 g IntraVENous Q24H    [START ON 12/27/2021] levoFLOXacin (LEVAQUIN) 500 mg in D5W IVPB  500 mg IntraVENous Q48H    sodium chloride (NS) flush 5-10 mL  5-10 mL IntraVENous PRN    glucose chewable tablet 16 g  4 Tablet Oral PRN    glucagon (GLUCAGEN) injection 1 mg  1 mg IntraMUSCular PRN    dextrose (D50W) injection syrg 12.5-25 g  25-50 mL IntraVENous PRN    levETIRAcetam (KEPPRA) 500 mg in 0.9% sodium chloride (MBP/ADV) 100 mL MBP  500 mg IntraVENous Q12H    sodium chloride (NS) flush 5-40 mL  5-40 mL IntraVENous Q8H    sodium chloride (NS) flush 5-40 mL  5-40 mL IntraVENous PRN    acetaminophen (TYLENOL) tablet 650 mg  650 mg Oral Q6H PRN    Or    acetaminophen (TYLENOL) suppository 650 mg  650 mg Rectal Q6H PRN    polyethylene glycol (MIRALAX) packet 17 g  17 g Oral DAILY PRN    ondansetron (ZOFRAN ODT) tablet 4 mg  4 mg Oral Q8H PRN    Or    ondansetron (ZOFRAN) injection 4 mg  4 mg IntraVENous Q6H PRN    famotidine (PEPCID) tablet 20 mg  20 mg Oral DAILY    [Held by provider] heparin (porcine) injection 5,000 Units  5,000 Units SubCUTAneous Q8H    insulin lispro (HUMALOG) injection   SubCUTAneous Q6H    thiamine (B-1) 250 mg in 0.9% sodium chloride 50 mL IVPB  250 mg IntraVENous Q12H    hydrALAZINE (APRESOLINE) 20 mg/mL injection 10 mg  10 mg IntraVENous Q6H PRN          Objective:      Active hospital medications were reviewed    Lab results and neuroradiology studies from the last 24 hours were reviewed. Prior to Admission medications    Medication Sig Start Date End Date Taking? Authorizing Provider   atorvastatin (LIPITOR) 10 mg tablet Take 1 Tablet by mouth daily. 12/16/21   Cathie Rosenbaum MD   insulin glargine (Semglee U-100 Insulin) 100 unit/mL injection 20 Units by SubCUTAneous route nightly. 12/16/21   Cathie Rosenbaum MD   levETIRAcetam (KEPPRA) 500 mg tablet Take 1 Tablet by mouth two (2) times a day. 12/16/21   Cathie Rosenbaum MD   amLODIPine (NORVASC) 5 mg tablet Take 1 Tablet by mouth daily. 12/16/21   Cathie Rosenbaum MD   b complex-vitamin c-folic acid (NEPHROCAPS) 1 mg capsule Take 1 Capsule by mouth daily. 12/16/21   Cathie Rosenbaum MD   cyanocobalamin (VITAMIN B12) 2,500 mcg sublingual tablet Take 1 Tablet by mouth daily. 12/16/21   Cathie Rosenbaum MD   epoetin jeff-epbx (RETACRIT) 10,000 unit/mL injection 1 mL by SubCUTAneous route Every Tues, Thur & Sat. Indications: anemia due to kidney failure 12/16/21   Cathie Rosenbaum MD   ferrous sulfate 325 mg (65 mg iron) tablet Take 1 Tablet by mouth daily (with breakfast). 12/16/21   Cathie Rosenbaum MD   folic acid (FOLVITE) 1 mg tablet Take 1 Tablet by mouth daily. 12/16/21   Cathie Rosenbaum MD   insulin lispro (HUMALOG) 100 unit/mL injection 10 units before breakfast  12 units before lunch  15 units before dinner 12/16/21   Cathie Rosenbaum MD   sevelamer carbonate (RENVELA) 800 mg tab tab Take 2 Tablets by mouth three (3) times daily (with meals). Indications: renal osteodystrophy with hyperphosphatemia 12/16/21   Cathie Rosenbaum MD   thiamine HCL (B-1) 100 mg tablet Take 1 Tablet by mouth daily. 12/16/21   Cathie Rosenbaum MD   senna-docusate (PERICOLACE) 8.6-50 mg per tablet Take 2 Tablets by mouth nightly. 12/16/21   Cathie Rosenbaum MD   pantoprazole (PROTONIX) 40 mg tablet Take 1 Tablet by mouth Daily (before breakfast). 12/16/21   Cathie Rosenbaum MD   bumetanide (BUMEX) 2 mg tablet Take 1 Tablet by mouth daily.  12/16/21 Valeriy Del Cid MD   cloNIDine (CATAPRES) 0.3 mg/24 hr 1 Patch by TransDERmal route every seven (7) days. 12/16/21   Valeriy Del Cid MD     Patient Vitals for the past 8 hrs:   BP Temp Pulse Resp SpO2 Weight   12/26/21 0441 (!) 182/98 99.3 °F (37.4 °C) 79 16 90 % 57.2 kg (126 lb)   12/26/21 0240 (!) 170/93 (!) 101.9 °F (38.8 °C) 73 16 90 %    [id number]/24 1901 - 12/26 0700  In: 950 [I.V.:950]  Out: 2400 [Urine:400]  12/24 1901 - 12/26 0700  In: 950 [I.V.:950]  Out: 2400 [Urine:400]RESULTRCNT(24h)Active Problems:    Hypoglycemia (11/25/2021)      EUSEBIA (acute kidney injury) ([facility] (11/25/2021)      Acute metabolic encephalopathy (10/26/3842)      ATN (acute tubular necrosis) ([address] (12/25/2021)      Interstitial nephritis determined by biopsy (12/25/2021)      Thrombocytopenia ([address] (12/25/2021)      Hypertension (12/25/2021)      Sepsis ([facility] [address] (12/25/2021)        Additional comments:I reviewed the patient's new clinical lab test results. Neurologic Exam    General Exam  No acute distress, normal body habitus     HEENT: Normocephalic, atraumatic, Sclera anicteric, normal conjunctiva  Mucous membranes: normal color and hydration            MENTAL STATUS:     He will arouse to voice and more so to painful stimulation. He does not make eye contact and does have some right hemispatial neglect. He is non verbal  CRANIAL NERVES:   II Pupils are midline, symmetric, both reactive to light and accommodation. Eyes appear conjugate but some left visual field preference     III, IV, VI  Extraocular movements are intact and there is no nystagmus.      VII  some right lower facial weakness is noted. VIII - Hearing is present.      MOTOR:          Appears to have decreased tone, moving right side less to withdrawal. At one point he did start shaking his left leg semi-rhythmically        Assessment:     Santiago Head is a 49 y. o. who was admitted with AMS in the setting of a febrile illness.  He remains minimally responsive but will open his eyes. There is some focality to his exam with perhaps some right hemisphere seizure activity or perhaps a left hemisphere structural process. He is on appropriately dosed Keppra given his renal function. Pending a clear infectious source I do believe it would be appropriate to cover him with acyclovir. A HSV PCR is pending    Plan:   1. STAT EEG today  2. Add acyclovir  3. MRI has been ordered.         Treausre Aguilar MD  neurology

## 2021-12-26 NOTE — ED NOTES
Report given to [facility], RN; pt to be admitted to telemetry unit soon. 2 officers at bedside, made aware of pt admit status.

## 2021-12-26 NOTE — ED NOTES
Report received from Linda, [facility]; a total of 4 officers undergoing shift change at the bedside. 2 officers will remain. Pt right foot and left arm are shackled iin handcuffs. No s/s of skin breakdown noted.

## 2021-12-26 NOTE — PROGRESS NOTES
0725: Bedside shift change report given to Dori Gunderson RN (oncoming nurse) by Miya Woodard RN (offgoing nurse). Report included the following information SBAR, Kardex, Intake/Output, MAR and Cardiac Rhythm NSR.     1323: Dr. Palak Taylor rounding on pt.    1905: Bedside shift change report given to Miya Woodard RN (oncoming nurse) by Dori Gunderson RN (offgoing nurse). Report included the following information SBAR, Kardex, Intake/Output, MAR and Cardiac Rhythm NSR.

## 2021-12-26 NOTE — PROGRESS NOTES
Problem: Diabetes Self-Management  Goal: *Disease process and treatment process  Description: Define diabetes and identify own type of diabetes; list 3 options for treating diabetes. Outcome: Progressing Towards Goal  Goal: *Incorporating nutritional management into lifestyle  Description: Describe effect of type, amount and timing of food on blood glucose; list 3 methods for planning meals. Outcome: Progressing Towards Goal  Goal: *Incorporating physical activity into lifestyle  Description: State effect of exercise on blood glucose levels. Outcome: Progressing Towards Goal  Goal: *Developing strategies to promote health/change behavior  Description: Define the ABC's of diabetes; identify appropriate screenings, schedule and personal plan for screenings. Outcome: Progressing Towards Goal  Goal: *Using medications safely  Description: State effect of diabetes medications on diabetes; name diabetes medication taking, action and side effects. Outcome: Progressing Towards Goal  Goal: *Monitoring blood glucose, interpreting and using results  Description: Identify recommended blood glucose targets  and personal targets. Outcome: Progressing Towards Goal  Goal: *Prevention, detection, treatment of acute complications  Description: List symptoms of hyper- and hypoglycemia; describe how to treat low blood sugar and actions for lowering  high blood glucose level. Outcome: Progressing Towards Goal  Goal: *Prevention, detection and treatment of chronic complications  Description: Define the natural course of diabetes and describe the relationship of blood glucose levels to long term complications of diabetes.   Outcome: Progressing Towards Goal  Goal: *Developing strategies to address psychosocial issues  Description: Describe feelings about living with diabetes; identify support needed and support network  Outcome: Progressing Towards Goal  Goal: *Insulin pump training  Outcome: Progressing Towards Goal  Goal: *Sick day guidelines  Outcome: Progressing Towards Goal  Goal: *Patient Specific Goal (EDIT GOAL, INSERT TEXT)  Outcome: Progressing Towards Goal     Problem: Patient Education: Go to Patient Education Activity  Goal: Patient/Family Education  Outcome: Progressing Towards Goal     Problem: Pressure Injury - Risk of  Goal: *Prevention of pressure injury  Description: Document Loco Scale and appropriate interventions in the flowsheet. Outcome: Progressing Towards Goal  Note: Pressure Injury Interventions:  Sensory Interventions: Assess changes in LOC,Minimize linen layers,Maintain/enhance activity level,Keep linens dry and wrinkle-free         Activity Interventions: Assess need for specialty bed    Mobility Interventions: Assess need for specialty bed    Nutrition Interventions: Discuss nutritional consult with provider    Friction and Shear Interventions: Apply protective barrier, creams and emollients,HOB 30 degrees or less                Problem: Patient Education: Go to Patient Education Activity  Goal: Patient/Family Education  Outcome: Progressing Towards Goal     Problem: Falls - Risk of  Goal: *Absence of Falls  Description: Document Nakia Fall Risk and appropriate interventions in the flowsheet. Outcome: Progressing Towards Goal  Note: Fall Risk Interventions:            Medication Interventions: Evaluate medications/consider consulting pharmacy    Elimination Interventions: Bed/chair exit alarm              Problem: Patient Education: Go to Patient Education Activity  Goal: Patient/Family Education  Outcome: Progressing Towards Goal     Problem: Diabetes Self-Management  Goal: *Disease process and treatment process  Description: Define diabetes and identify own type of diabetes; list 3 options for treating diabetes.   Outcome: Progressing Towards Goal     Problem: Diabetes Self-Management  Goal: *Incorporating nutritional management into lifestyle  Description: Describe effect of type, amount and timing of food on blood glucose; list 3 methods for planning meals. Outcome: Progressing Towards Goal     Problem: Diabetes Self-Management  Goal: *Incorporating physical activity into lifestyle  Description: State effect of exercise on blood glucose levels. Outcome: Progressing Towards Goal     Problem: Diabetes Self-Management  Goal: *Developing strategies to promote health/change behavior  Description: Define the ABC's of diabetes; identify appropriate screenings, schedule and personal plan for screenings. Outcome: Progressing Towards Goal     Problem: Diabetes Self-Management  Goal: *Using medications safely  Description: State effect of diabetes medications on diabetes; name diabetes medication taking, action and side effects. Outcome: Progressing Towards Goal     Problem: Diabetes Self-Management  Goal: *Monitoring blood glucose, interpreting and using results  Description: Identify recommended blood glucose targets  and personal targets. Outcome: Progressing Towards Goal     Problem: Diabetes Self-Management  Goal: *Prevention, detection, treatment of acute complications  Description: List symptoms of hyper- and hypoglycemia; describe how to treat low blood sugar and actions for lowering  high blood glucose level. Outcome: Progressing Towards Goal     Problem: Diabetes Self-Management  Goal: *Prevention, detection and treatment of chronic complications  Description: Define the natural course of diabetes and describe the relationship of blood glucose levels to long term complications of diabetes.   Outcome: Progressing Towards Goal     Problem: Diabetes Self-Management  Goal: *Developing strategies to address psychosocial issues  Description: Describe feelings about living with diabetes; identify support needed and support network  Outcome: Progressing Towards Goal     Problem: Diabetes Self-Management  Goal: *Insulin pump training  Outcome: Progressing Towards Goal     Problem: Diabetes Self-Management  Goal: *Sick day guidelines  Outcome: Progressing Towards Goal     Problem: Diabetes Self-Management  Goal: *Patient Specific Goal (EDIT GOAL, INSERT TEXT)  Outcome: Progressing Towards Goal     Problem: Patient Education: Go to Patient Education Activity  Goal: Patient/Family Education  Outcome: Progressing Towards Goal     Problem: Pressure Injury - Risk of  Goal: *Prevention of pressure injury  Description: Document Loco Scale and appropriate interventions in the flowsheet. Outcome: Progressing Towards Goal  Note: Pressure Injury Interventions:  Sensory Interventions: Assess changes in LOC,Minimize linen layers,Maintain/enhance activity level,Keep linens dry and wrinkle-free         Activity Interventions: Assess need for specialty bed    Mobility Interventions: Assess need for specialty bed    Nutrition Interventions: Discuss nutritional consult with provider    Friction and Shear Interventions: Apply protective barrier, creams and emollients,HOB 30 degrees or less                Problem: Patient Education: Go to Patient Education Activity  Goal: Patient/Family Education  Outcome: Progressing Towards Goal     Problem: Falls - Risk of  Goal: *Absence of Falls  Description: Document Nakia Fall Risk and appropriate interventions in the flowsheet.   Outcome: Progressing Towards Goal  Note: Fall Risk Interventions:            Medication Interventions: Evaluate medications/consider consulting pharmacy    Elimination Interventions: Bed/chair exit alarm              Problem: Patient Education: Go to Patient Education Activity  Goal: Patient/Family Education  Outcome: Progressing Towards Goal

## 2021-12-26 NOTE — PROGRESS NOTES
PT orders received and chart reviewed. PT eval attempted at 1005, [facility] Pt able to open eyes to sternal rub, no command following. Pt not appropriate for skilled PT at this time. Will continue to follow.     Thank you for this referral.   Sloan Bran PT DPT

## 2021-12-26 NOTE — ED NOTES
Lumbar puntcture was performed at the bedside by Neurology MD with pt ED nurse at bedside. Fluid appeared clear and pt tolerated well.

## 2021-12-26 NOTE — ROUTINE PROCESS
TRANSFER - IN REPORT:    Verbal report received from Gustavus Scheuermann, [address] [facility] (name) on Chong Stein  being received from ED(unit) for routine progression of care      Report consisted of patients Situation, Background, Assessment and   Recommendations(SBAR). Information from the following report(s) SBAR, Kardex and ED Summary was reviewed with the receiving nurse. Opportunity for questions and clarification was provided. Assessment completed upon patients arrival to unit and care assumed.

## 2021-12-26 NOTE — PROGRESS NOTES
Hospitalist Progress Note    Patient: Olman Sheppard MRN: [id number]  CSN: [id number]    YOB: 1965  Age: 49 y.o. Sex: male    DOA: 12/24/2021 LOS:  LOS: 2 days          tcurrent 100.7. tmax 103. 1. Stat EEG ordered per neurology. Patient seen and examined at bedside, he opens his eyes a bit more to voice today, and wiggles toes on right foot. Not squeezing hands. Assessment/Plan       1. Acute metabolic encephalopathy, with fever. Continue empiric antibiotics, acyclovir added. Respiratory virus panel negative. CSF stain no organisms, follow culture. Blood cultures (1224, 1225) no growth to date. HIV pending. Follow MRI. Neurology follows. ID consult. 2. ESRD / HD per Dr. Roger Sinha  3. Fever poa. See #1. 4. DM1 with hypoglycemia. lantus dose reduced. 5. Seizure d/o, concern for recurrent partial seizure, follow EEG. Vimpat added per neurology recommendations. Follow MRI  6. Thrombocytopenia. HIT pending. 7. Anemia of ckd  8. Hypertension  9. Dyslipidemia  10. Polysubstance abuse  11. Tiny right apical nodule. Recommend follow-up CT in 12 months if the patient is considered to be at high risk for lung CA. 12. Small left pleural effusion. 13. Recent admission [facility] aspiration pna and shock. 14.  Thrombocytopenia in the setting of sepsis. Close monitoring. 15.  chemical DVT prophylaxis was avoided for 24 hours after LP. 16.  Full code. Return to [facility] when ready. I discussed the case with Dr. Gomes Come    Time spent 25 minutes.     Additional Notes:      Case discussed with:  [x]Patient  []Family  [x]Nursing  []Case Management  DVT Prophylaxis:  []Lovenox  []Hep SQ  [x]SCDs  []Coumadin   []On Heparin gtt    Vital signs/Intake and Output:  Visit Vitals  BP (!) 179/99 (BP 1 Location: Right upper arm, BP Patient Position: At rest)   Pulse 78   Temp (!) 100.7 °F (38.2 °C)   Resp 20   Wt 57.2 kg (126 lb)   SpO2 93%   BMI 23.05 kg/m²     Current Shift:  No intake/output data recorded. Last three shifts:  12/24 1901 - 12/26 0700  In: 950 [I.V.:950]  Out: 2400 [Urine:400]    Patient opens eyes briefly to voice, wiggles toes on right foot. Chronically ill-appearing. Appears older than his stated age. guard at bedside. Normocephalic atraumatic pupils equally round reactive to light. Does not open mouth for oral exam.  Regular rate and rhythm. No murmur  Clear to auscultation bilateral anterior and infraaxillary fields  Abdomen soft nontender nondistended nabs  Extremities no edema. DP 2+ bilaterally  Neuro opens eyes briefly to voice. Not answering questions. Wiggles toes on right foot, does not squeeze fingers. No rash to visible skin. Toenails elongated. Medications Reviewed      Labs: Results:       Chemistry Recent Labs     12/26/21  0728 12/25/21  0611 12/24/21  1450   * 76 149*   * 135* 135*   K 3.6 4.6 4.0   CL 98* 100 98*   CO2 24 27 30   BUN 40* 35* 33*   CREA 5.41* 5.73* 4.61*   CA 8.4* 8.1*  8.1* 8.2*   AGAP 13 8 7   BUCR 7* 6* 7*   AP  --  58 60   TP  --  6.6 6.7   ALB  --  3.4 3.6   GLOB  --  3.2 3.1   AGRAT  --  1.1 1.2      CBC w/Diff Recent Labs     12/26/21  0728 12/25/21  0716 12/24/21  1450   WBC 10.8 10.1 8.9   RBC 3.22* 3.32* 3.45*   HGB 10.0* 10.2* 10.6*   HCT 30.6* 31.3* 32.2*   * 108* 98*   GRANS  --  84* 79*   LYMPH  --  8* 9*   EOS  --  0 1      Cardiac Enzymes Recent Labs     12/24/21  1450   CPK 34*      Coagulation Recent Labs     12/25/21  0611   PTP 14.5   INR 1.1   APTT 25.6       Lipid Panel Lab Results   Component Value Date/Time    Cholesterol, total 155 04/04/2019 09:22 AM    HDL Cholesterol 52 04/04/2019 09:22 AM    LDL, calculated 86 04/04/2019 09:22 AM    Triglyceride 87 04/04/2019 09:22 AM    CHOL/HDL Ratio 3.0 04/04/2019 09:22 AM      BNP No results for input(s): BNPP in the last 72 hours.    Liver Enzymes Recent Labs     12/25/21  0611   TP 6.6   ALB 3.4   AP 58      Thyroid Studies Lab Results Component Value Date/Time    TSH 1.55 12/25/2021 06:11 AM        Procedures/imaging: see electronic medical records for all procedures/Xrays and details which were not copied into this note but were reviewed prior to creation of Plan

## 2021-12-26 NOTE — ED NOTES
Spoke with hospitalist. He instructed RN to put cold packs on the patient and call report to the floor.

## 2021-12-26 NOTE — ROUTINE PROCESS
0230: Patient arrived on unit via stretcher. Patient arrived with transport and two correctional officers. Patient on RA with forensic restraints placed on left arm and leg. 0240: T- 101.9 Axillary    0313: Tylenol suppository given. 0441: BP- 182/98    0450: PRN Hydralazine given. 0700: Bedside and Verbal shift change report given to Kesha Moreno RN (oncoming nurse) by Conor Nettles RN (offgoing nurse). Report included the following information SBAR, Kardex and Cardiac Rhythm NSR.

## 2021-12-26 NOTE — PROGRESS NOTES
MD concerned for STAT MRI that was needed 12/24. Pt is not consentable and is unable to answer necessary questions for MRI screening. MD informed of this. Necessary x-rays ordered.

## 2021-12-26 NOTE — PROGRESS NOTES
New OT orders received and chart reviewed. Unable to see pt for OT evaluation at this time due to:    Pt is asleep upon entry with  present in room and forensic restraints present. Pt opens eyes to sternal rub, however, does not follow commands. Pt is not currently appropriate for OT.     Thank you for this referral.    Celeste Dempsey MS, OTR/L

## 2021-12-26 NOTE — PROGRESS NOTES
Speech Therapy Note:    SLP orders to evaluate acknowledged and received. Could not progress with ST intervention due to:     [x]  Lethargic, unable to be alerted enough for safe PO intake  []  Refused participation  []  Off the unit  []  NPO for procedure  []  Other:     Will hold this date and continue to attempt as medically indicated.      Thank you for this referral,  Javi Nixon M.Ed., [address] [facility]  Speech-Language Pathologist

## 2021-12-26 NOTE — ED NOTES
TRANSFER - OUT REPORT:    Verbal report given to Brunilda Vasquez RN on Mercy Doe  being transferred to [facility], Room 355 for routine progression of care       Report consisted of patients Situation, Background, Assessment and   Recommendations(SBAR). Information from the following report(s) SBAR was reviewed with the receiving nurse. Lines:   Peripheral IV 12/24/21 Left;Posterior External jugular (Active)       Peripheral IV 12/24/21 Right Arm (Active)   Site Assessment Clean, dry, & intact 12/24/21 1745   Phlebitis Assessment 0 12/24/21 1745   Infiltration Assessment 0 12/24/21 1745   Dressing Status Clean, dry, & intact 12/24/21 1745   Dressing Type Transparent 12/24/21 1745        Opportunity for questions and clarification was provided.       Patient transported with:  RN  Correctional Officers

## 2021-12-26 NOTE — PROCEDURES
NEUROVASCULAR BRIEF OPERATIVE NOTE- Performed by Dr Riccardo Irwin and this note was dictated to Dr Alok Hill due to sudden Epic access issues. Alie Martell [address] [facility].    Date of Procedure:  12/25/2021    Surgeon:  Dr Crescencio Rodriguez    Preoperative Diagnosis:  encephalopathy    Postoperative Diagnosis:  encephalopathy    Procedure:  Lumbar puncture    Anesthesia:  1% local with lidocaine        Specimens:  16 CC of clear CSF     Complications:  none immediate    The patient was placed in the left side down fetal position and the L4-5 spinal interspace was identified. He was prepped and draped in a sterile manner. Using a 21 Gauge needle CSF was obtained in 1 pass. An opening pressure of [id number] was obtained and approximately 16 cc of clear CSF was obtained.     The procedure was well tolerated    Riccardo Irwin M.D.

## 2021-12-26 NOTE — ED NOTES
Reported pt new rectal temp; discussed pt hypertension, and neurological reflexes to Dr. Eddi Saenz. Dr Eddi Saenz acknowledges hydralazine use and that should be admin PRN for  or higher. Try applying ice packs to bring body temperature down. Verbal order received for pt to be admitted on step down unit versus telemetry as initially ordered. Verbal order in place, and informed PM RN as well.
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
## 2021-12-27 LAB
ANION GAP SERPL CALC-SCNC: 12 MMOL/L (ref 3–18)
ATRIAL RATE: 89 BPM
BUN SERPL-MCNC: 56 MG/DL (ref 7–18)
BUN/CREAT SERPL: 8 (ref 12–20)
CALCIUM SERPL-MCNC: 8.6 MG/DL (ref 8.5–10.1)
CALCULATED P AXIS, ECG09: 76 DEGREES
CALCULATED R AXIS, ECG10: 47 DEGREES
CALCULATED T AXIS, ECG11: 78 DEGREES
CHLORIDE SERPL-SCNC: 100 MMOL/L (ref 100–111)
CO2 SERPL-SCNC: 23 MMOL/L (ref 21–32)
CREAT SERPL-MCNC: 6.78 MG/DL (ref 0.6–1.3)
DIAGNOSIS, 93000: NORMAL
ERYTHROCYTE [DISTWIDTH] IN BLOOD BY AUTOMATED COUNT: 14.9 % (ref 11.6–14.5)
GLUCOSE BLD STRIP.AUTO-MCNC: 137 MG/DL (ref 70–110)
GLUCOSE BLD STRIP.AUTO-MCNC: 150 MG/DL (ref 70–110)
GLUCOSE BLD STRIP.AUTO-MCNC: 177 MG/DL (ref 70–110)
GLUCOSE SERPL-MCNC: 165 MG/DL (ref 74–99)
HBV CORE AB SERPL QL IA: NEGATIVE
HBV SURFACE AB SER QL IA: NEGATIVE
HBV SURFACE AB SERPL IA-ACNC: <3.1 MIU/ML
HCT VFR BLD AUTO: 29.6 % (ref 36–48)
HEP BS AB COMMENT,HBSAC: ABNORMAL
HGB BLD-MCNC: 9.8 G/DL (ref 13–16)
HIV 1+2 AB+HIV1 P24 AG SERPL QL IA: NONREACTIVE
HIV12 RESULT COMMENT, HHIVC: NORMAL
MCH RBC QN AUTO: 31 PG (ref 24–34)
MCHC RBC AUTO-ENTMCNC: 33.1 G/DL (ref 31–37)
MCV RBC AUTO: 93.7 FL (ref 78–100)
NRBC # BLD: 0 K/UL (ref 0–0.01)
NRBC BLD-RTO: 0 PER 100 WBC
P-R INTERVAL, ECG05: 142 MS
PLATELET # BLD AUTO: 152 K/UL (ref 135–420)
PMV BLD AUTO: 11.4 FL (ref 9.2–11.8)
POTASSIUM SERPL-SCNC: 3.8 MMOL/L (ref 3.5–5.5)
Q-T INTERVAL, ECG07: 412 MS
QRS DURATION, ECG06: 84 MS
QTC CALCULATION (BEZET), ECG08: 501 MS
RBC # BLD AUTO: 3.16 M/UL (ref 4.35–5.65)
SODIUM SERPL-SCNC: 135 MMOL/L (ref 136–145)
VANCOMYCIN SERPL-MCNC: 17.1 UG/ML (ref 5–40)
VENTRICULAR RATE, ECG03: 89 BPM
WBC # BLD AUTO: 14.3 K/UL (ref 4.6–13.2)

## 2021-12-27 PROCEDURE — 74011000258 HC RX REV CODE- 258: Performed by: HOSPITALIST

## 2021-12-27 PROCEDURE — 74011250636 HC RX REV CODE- 250/636: Performed by: NURSE PRACTITIONER

## 2021-12-27 PROCEDURE — 82962 GLUCOSE BLOOD TEST: CPT

## 2021-12-27 PROCEDURE — C9254 INJECTION, LACOSAMIDE: HCPCS | Performed by: HOSPITALIST

## 2021-12-27 PROCEDURE — 2709999900 HC NON-CHARGEABLE SUPPLY

## 2021-12-27 PROCEDURE — 74011636637 HC RX REV CODE- 636/637: Performed by: EMERGENCY MEDICINE

## 2021-12-27 PROCEDURE — 36415 COLL VENOUS BLD VENIPUNCTURE: CPT

## 2021-12-27 PROCEDURE — 74011000258 HC RX REV CODE- 258: Performed by: INTERNAL MEDICINE

## 2021-12-27 PROCEDURE — 65660000000 HC RM CCU STEPDOWN

## 2021-12-27 PROCEDURE — 74011000250 HC RX REV CODE- 250: Performed by: HOSPITALIST

## 2021-12-27 PROCEDURE — 80202 ASSAY OF VANCOMYCIN: CPT

## 2021-12-27 PROCEDURE — 74011000258 HC RX REV CODE- 258: Performed by: EMERGENCY MEDICINE

## 2021-12-27 PROCEDURE — 74011250637 HC RX REV CODE- 250/637: Performed by: NURSE PRACTITIONER

## 2021-12-27 PROCEDURE — 90935 HEMODIALYSIS ONE EVALUATION: CPT

## 2021-12-27 PROCEDURE — 74011250636 HC RX REV CODE- 250/636: Performed by: EMERGENCY MEDICINE

## 2021-12-27 PROCEDURE — 74011250636 HC RX REV CODE- 250/636: Performed by: HOSPITALIST

## 2021-12-27 PROCEDURE — 80048 BASIC METABOLIC PNL TOTAL CA: CPT

## 2021-12-27 PROCEDURE — 74011000258 HC RX REV CODE- 258: Performed by: NURSE PRACTITIONER

## 2021-12-27 PROCEDURE — 85027 COMPLETE CBC AUTOMATED: CPT

## 2021-12-27 PROCEDURE — 74011250636 HC RX REV CODE- 250/636: Performed by: INTERNAL MEDICINE

## 2021-12-27 PROCEDURE — 99233 SBSQ HOSP IP/OBS HIGH 50: CPT | Performed by: INTERNAL MEDICINE

## 2021-12-27 RX ORDER — LEVETIRACETAM 10 MG/ML
1000 INJECTION INTRAVASCULAR ONCE
Status: COMPLETED | OUTPATIENT
Start: 2021-12-27 | End: 2021-12-28

## 2021-12-27 RX ORDER — LEVETIRACETAM 10 MG/ML
1000 INJECTION INTRAVASCULAR EVERY 12 HOURS
Status: DISCONTINUED | OUTPATIENT
Start: 2021-12-28 | End: 2021-12-28 | Stop reason: HOSPADM

## 2021-12-27 RX ORDER — LORAZEPAM 2 MG/ML
2 INJECTION INTRAMUSCULAR
Status: DISCONTINUED | OUTPATIENT
Start: 2021-12-27 | End: 2021-12-28 | Stop reason: HOSPADM

## 2021-12-27 RX ORDER — DEXTROSE MONOHYDRATE AND SODIUM CHLORIDE 5; .9 G/100ML; G/100ML
50 INJECTION, SOLUTION INTRAVENOUS CONTINUOUS
Status: DISCONTINUED | OUTPATIENT
Start: 2021-12-27 | End: 2021-12-28 | Stop reason: HOSPADM

## 2021-12-27 RX ADMIN — DEXTROSE MONOHYDRATE AND SODIUM CHLORIDE 50 ML/HR: 5; .9 INJECTION, SOLUTION INTRAVENOUS at 18:51

## 2021-12-27 RX ADMIN — FAMOTIDINE 20 MG: 20 TABLET ORAL at 08:47

## 2021-12-27 RX ADMIN — Medication 10 ML: at 13:31

## 2021-12-27 RX ADMIN — HEPARIN SODIUM 5000 UNITS: 5000 INJECTION INTRAVENOUS; SUBCUTANEOUS at 13:30

## 2021-12-27 RX ADMIN — Medication 2 UNITS: at 00:05

## 2021-12-27 RX ADMIN — Medication 10 ML: at 05:34

## 2021-12-27 RX ADMIN — VANCOMYCIN HYDROCHLORIDE 500 MG: 500 INJECTION, POWDER, LYOPHILIZED, FOR SOLUTION INTRAVENOUS at 17:19

## 2021-12-27 RX ADMIN — SODIUM CHLORIDE 100 MG: 9 INJECTION, SOLUTION INTRAVENOUS at 01:40

## 2021-12-27 RX ADMIN — ACYCLOVIR SODIUM 250 MG: 500 INJECTION, SOLUTION INTRAVENOUS at 13:30

## 2021-12-27 RX ADMIN — Medication 2 UNITS: at 05:33

## 2021-12-27 RX ADMIN — WATER 1 G: 1 INJECTION INTRAMUSCULAR; INTRAVENOUS; SUBCUTANEOUS at 17:19

## 2021-12-27 RX ADMIN — LEVETIRACETAM 500 MG: 100 INJECTION, SOLUTION INTRAVENOUS at 13:30

## 2021-12-27 RX ADMIN — HYDRALAZINE HYDROCHLORIDE 10 MG: 20 INJECTION INTRAMUSCULAR; INTRAVENOUS at 00:03

## 2021-12-27 RX ADMIN — SODIUM CHLORIDE 100 MG: 9 INJECTION, SOLUTION INTRAVENOUS at 13:31

## 2021-12-27 RX ADMIN — THIAMINE HYDROCHLORIDE 250 MG: 100 INJECTION, SOLUTION INTRAMUSCULAR; INTRAVENOUS at 08:47

## 2021-12-27 RX ADMIN — Medication 2 UNITS: at 13:30

## 2021-12-27 RX ADMIN — LEVETIRACETAM 500 MG: 100 INJECTION, SOLUTION INTRAVENOUS at 00:05

## 2021-12-27 RX ADMIN — HEPARIN SODIUM 5000 UNITS: 5000 INJECTION INTRAVENOUS; SUBCUTANEOUS at 05:33

## 2021-12-27 NOTE — PROGRESS NOTES
Problem: Pressure Injury - Risk of  Goal: *Prevention of pressure injury  Description: Document Loco Scale and appropriate interventions in the flowsheet. Outcome: Progressing Towards Goal  Note: Pressure Injury Interventions:  Sensory Interventions: Assess changes in LOC,Keep linens dry and wrinkle-free,Minimize linen layers         Activity Interventions: Assess need for specialty bed    Mobility Interventions: Assess need for specialty bed,HOB 30 degrees or less,Turn and reposition approx. every two hours(pillow and wedges)    Nutrition Interventions: Discuss nutritional consult with provider    Friction and Shear Interventions: Apply protective barrier, creams and emollients,HOB 30 degrees or less,Lift sheet,Minimize layers                Problem: Falls - Risk of  Goal: *Absence of Falls  Description: Document Nakia Fall Risk and appropriate interventions in the flowsheet.   Outcome: Progressing Towards Goal  Note: Fall Risk Interventions:            Medication Interventions: Evaluate medications/consider consulting pharmacy    Elimination Interventions: Call light in reach

## 2021-12-27 NOTE — ROUTINE PROCESS
1900: Bedside and Verbal shift change report given to Devi Duran RN (oncoming nurse) by April Blanton RN (offgoing nurse). Report included the following information SBAR, Kardex and Cardiac Rhythm NSR.     1930: Patient left unit with officer and transport. 2050: Patient arrived back to unit with transport and officer. 2135: Pharmacy called regarding thiamine. Will remake dose and send to unit shortly. 0013: Patient began to have a seizure that lasted a few minutes. Patient began to have shaking in legs. 0700: Bedside and Verbal shift change report given to Dave Duarte RN (oncoming nurse) by Devi Duran RN (offgoing nurse). Report included the following information SBAR, Kardex and Cardiac Rhythm NSR.

## 2021-12-27 NOTE — PROGRESS NOTES
SLP Note:    New orders received and attempted; however, pt not responsive/appropriate for po presentations. Will address as pt medically appropriate.      Thank you for this referral,   Rick Soto M.S., [address] [facility]  Speech-Language Pathologist
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
## 2021-12-27 NOTE — ROUTINE PROCESS
1910:  Bedside and Verbal shift change report given to Purvi Roca RN (oncoming nurses) by Chanel Cantu RN (offgoing nurse). Report included the following information SBAR, Kardex and Pertinent medical hx & updates.

## 2021-12-27 NOTE — PROGRESS NOTES
Patient is an inmate at [facility] and will return when medically stable.      GERARDO Powell, RN  Pager # [phone]  Care Manager

## 2021-12-27 NOTE — PROGRESS NOTES
Progress Note    Blanche Davidson  49 y.o. Admit Date: 12/24/2021  Active Problems:    Hypoglycemia (11/25/2021) POA: Unknown      EUSEBIA (acute kidney injury) ([facility] [address] (11/25/2021) POA: Yes      Acute metabolic encephalopathy (44/04/1619) POA: Unknown      ATN (acute tubular necrosis) ([facility] [address] (12/25/2021) POA: Unknown      Interstitial nephritis determined by biopsy (12/25/2021) POA: Unknown      Thrombocytopenia ([facility] [address] (12/25/2021) POA: Unknown      Hypertension (12/25/2021) POA: Unknown      Sepsis ([facility] [address] (12/25/2021) POA: Unknown            Subjective:     No change in Status. Remained Unresponsive, open eyes ,occasional intermittent twitching of legs, has Garcia. Fever curve has improved. A comprehensive review of systems was negative except for that written in the History of Present Illness.     Objective:     Visit Vitals  BP (!) 166/92 (BP 1 Location: Right upper arm, BP Patient Position: At rest)   Pulse 80   Temp 99.3 °F (37.4 °C)   Resp 16   Wt 58.1 kg (128 lb)   SpO2 95%   BMI 23.41 kg/m²         Intake/Output Summary (Last 24 hours) at 12/27/2021 1247  Last data filed at 12/26/2021 1720  Gross per 24 hour   Intake 0 ml   Output 500 ml   Net -500 ml       Current Facility-Administered Medications   Medication Dose Route Frequency Provider Last Rate Last Admin    acyclovir (ZOVIRAX) 250 mg in 0.9% sodium chloride 100 mL IVPB  5 mg/kg (Ideal) IntraVENous Q24H Kevin Webster  mL/hr at 12/26/21 1312 250 mg at 12/26/21 1312    lacosamide (VIMPAT) 100 mg in 0.9% sodium chloride 100 mL IVPB  100 mg IntraVENous Q12H Kevin Webster  mL/hr at 12/27/21 0140 100 mg at 12/27/21 0140    VANCOMYCIN INFORMATION NOTE   Other Rx Dosing/Monitoring Kevin Webster MD        insulin glargine (LANTUS) injection 3 Units  3 Units SubCUTAneous QHS Kvein Webster MD   3 Units at 12/26/21 2114    cefepime (MAXIPIME) 1 g in sterile water (preservative free) 10 mL IV syringe  1 g IntraVENous Q24H Arun Marley MD   1 g at 12/26/21 1750    levoFLOXacin (LEVAQUIN) 500 mg in D5W IVPB  500 mg IntraVENous Q48H Shahla Garcia MD        sodium chloride (NS) flush 5-10 mL  5-10 mL IntraVENous PRN Roya Ace PA        glucose chewable tablet 16 g  4 Tablet Oral PRN Jeremie Kingsley, NP        glucagon (GLUCAGEN) injection 1 mg  1 mg IntraMUSCular PRN Jeremie Kingsley, NP        dextrose (D50W) injection syrg 12.5-25 g  25-50 mL IntraVENous PRN Jeremie Kingsley, NP   25 g at 12/25/21 0051    levETIRAcetam (KEPPRA) 500 mg in 0.9% sodium chloride (MBP/ADV) 100 mL MBP  500 mg IntraVENous Q12H Jeremie Kingsley, NP 0 mL/hr at 12/26/21 0058 500 mg at 12/27/21 0005    sodium chloride (NS) flush 5-40 mL  5-40 mL IntraVENous Q8H Anne Marie Kingsley NP   10 mL at 12/27/21 0534    sodium chloride (NS) flush 5-40 mL  5-40 mL IntraVENous PRN Jeremie Kingsley, NP        acetaminophen (TYLENOL) tablet 650 mg  650 mg Oral Q6H PRN Jeremie Kingsley, NP        Or    acetaminophen (TYLENOL) suppository 650 mg  650 mg Rectal Q6H PRN Jeremie Kingsley, NP   650 mg at 12/26/21 0313    polyethylene glycol (MIRALAX) packet 17 g  17 g Oral DAILY PRN Jeremie Kingsley, NP        ondansetron (ZOFRAN ODT) tablet 4 mg  4 mg Oral Q8H PRN Anne Marie Kingsley NP        Or    ondansetron (ZOFRAN) injection 4 mg  4 mg IntraVENous Q6H PRN Anne Marie Kingsley NP        famotidine (PEPCID) tablet 20 mg  20 mg Oral DAILY Anne Marie Kingsley NP   20 mg at 12/27/21 0847    heparin (porcine) injection 5,000 Units  5,000 Units SubCUTAneous Q8H Anne Marie Kingsley NP   5,000 Units at 12/27/21 0533    insulin lispro (HUMALOG) injection   SubCUTAneous Q6H Roman Lozano MD   2 Units at 12/27/21 0533    thiamine (B-1) 250 mg in 0.9% sodium chloride 50 mL IVPB  250 mg IntraVENous Q12H Roman Lozano  mL/hr at 12/27/21 0847 250 mg at 12/27/21 7124    hydrALAZINE (APRESOLINE) 20 mg/mL injection 10 mg  10 mg IntraVENous Q6H ANASTASIIA Cortez MD   10 mg at 12/27/21 0003                Data Review:    CBC w/Diff    Recent Labs     12/27/21 0544 12/26/21 0728 12/26/21 0728 12/25/21  0716 12/25/21  0716   WBC 14.3*  --  10.8  --  10.1   RBC 3.16*  --  3.22*  --  3.32*   HGB 9.8*  --  10.0*  --  10.2*   HCT 29.6*  --  30.6*  --  31.3*   MCV 93.7   < > 95.0   < > 94.3   MCH 31.0   < > 31.1   < > 30.7   MCHC 33.1   < > 32.7   < > 32.6   RDW 14.9*   < > 15.2*   < > 15.7*    < > = values in this interval not displayed. Recent Labs     12/27/21 0544 12/26/21  0728 12/25/21  0716 12/24/21  1450 12/24/21  1450   MONOS  --   --  7  --  11*   EOS  --   --  0  --  1   BASOS  --   --  0   < > 1   RDW 14.9*   < > 15.7*   < > 15.2*    < > = values in this interval not displayed. Comprehensive Metabolic Profile    Recent Labs     12/27/21 0544 12/26/21  0728 12/25/21  0611   * 135* 135*   K 3.8 3.6 4.6    98* 100   CO2 23 24 27   BUN 56* 40* 35*   CREA 6.78* 5.41* 5.73*    Recent Labs     12/27/21 0544 12/26/21 0728 12/25/21  0611 12/24/21  1450 12/24/21  1450   CA 8.6 8.4* 8.1*  8.1*   < > 8.2*   PHOS  --   --  3.3  --   --    ALB  --   --  3.4  --  3.6   TP  --   --  6.6  --  6.7   TBILI  --   --  0.6  --  0.5    < > = values in this interval not displayed. Blood Cultures & CSF cultures remain negative                Impression:       Active Hospital Problems    Diagnosis Date Noted    ATN (acute tubular necrosis) ([facility] [address] 12/25/2021    Interstitial nephritis determined by biopsy 12/25/2021    Thrombocytopenia ([address] 12/25/2021    Hypertension 12/25/2021    Sepsis ([facility] [address] 12/25/2021    Acute metabolic encephalopathy 90/02/4140    Hypoglycemia 11/25/2021    EUSEBIA (acute kidney injury) ([facility] 11/25/2021            Plan:     Continue supportive care, antibiotics, Neuro's recommendations,may need ID'S assistanceif not done .   Dialysis in his room today.     Dewey Aguayo MD

## 2021-12-27 NOTE — PROGRESS NOTES
Hospitalist Progress Note    Patient: Yusef White MRN: [id number]  CSN: [id number]    YOB: 1965  Age: 49 y.o. Sex: male    DOA: 12/24/2021 LOS:  LOS: 3 days        Pt does not respond to verbal and tactile stimulus including deep sternal rub. Per security staff with him, pt has not woken up or eaten. Noted to have rhythmic movements of bilateral lower extremities. Eyes w/ fixed downward gaze. Assessment/Plan     49year old male w/ h/o ESRD on HD, DM, seizure d/o recently admitted at an outside facility for management of aspiration pna, admitted on 12/24 after he presented with reports of confusion and hypoglycemia.      -Acute metabolic encephalopathy cause unclear. Pt remains unresponsive. No further episodes of hypoglycemia noted.    -->S/p LP by neurologist on 12/25, CSF analysis not c/w bacterial meningitis. CSF Cx NGTD. On Cefepime, on Vancomycin, started on acyclovir on 12/26    -->MRI brain on 12/26--> no acute intracranial findings.      -->EEG done 12/26-->  Per neurologist read as: These findings indicate two different processes: 1. A diffuse encephalopathic process which could could be from toxic, metabolic or parainfectious processes. 2. Focal cortical hyperexcitability in the right hemisphere and associated electrographic seizures. Discussed w/ neurology with recommendations made to give loading dose of keppra as it was not given prior and to increase keppra to 1000 mg bid and to transfer patient to a facility with continuous EEG monitoring.     -->On Vimpat and Keppra   --> On high dose thiamine          -Hypoglycemia: resolved    -Interstitial nephritis    -Leukocytosis    -Thrombocytopenia: resolved.       HISTORY OF:  -HTN  -DM1  -Dyslipidemia  -Polysubstance abuse  -Recent admission to [facility] with aspiration pna and shock    PLAN:  -Transfer to ICU   -Transfer center contacted to facilitate transfer to a facility with continuous EEG monitoring.  -Loading dose of keppra then increase bid dosing, cont vimpat  -Cont current abx for now  -Cont high dose IV thiamine regimen  -HD management per nephrologist  -Start D5 as pt has not been eating  -Will need NGT to be placed by IR for enteral feeding          Additional Notes:      Case discussed with:  [x]Patient  []Family  [x]Nursing  []Case Management  DVT Prophylaxis:  []Lovenox  []Hep SQ  [x]SCDs  []Coumadin   []On Heparin gtt    Vital signs/Intake and Output:  Visit Vitals  BP (!) 166/92 (BP 1 Location: Right upper arm, BP Patient Position: At rest)   Pulse 80   Temp 99.3 °F (37.4 °C)   Resp 16   Wt 58.1 kg (128 lb)   SpO2 95%   BMI 23.41 kg/m²     Current Shift:  No intake/output data recorded. Last three shifts:  12/25 1901 - 12/27 0700  In: 250 [I.V.:250]  Out: 900 [Urine:900]    Opens eyes briefly with legs being passively moved. Intermittently with rhythmic movement of bilateral lower extremities/feet,   HEENT: NCAT. COR: RRR, no murmurs  PULM: CTAB  ABD: soft, nt, nd  Extremities no edema. Contracted  Neuro: opened eyes briefly when legs were moved, did not make eye contact, no spontaneous movement of extremities/face noted, neck is stiff. Intermittent rhythmic movement of feet and toes noted. Medications Reviewed      Labs: Results:       Chemistry Recent Labs     12/27/21  0544 12/26/21  0728 12/25/21  0611 12/24/21  1450 12/24/21  1450   * 199* 76   < > 149*   * 135* 135*   < > 135*   K 3.8 3.6 4.6   < > 4.0    98* 100   < > 98*   CO2 23 24 27   < > 30   BUN 56* 40* 35*   < > 33*   CREA 6.78* 5.41* 5.73*   < > 4.61*   CA 8.6 8.4* 8.1*  8.1*   < > 8.2*   AGAP 12 13 8   < > 7   BUCR 8* 7* 6*   < > 7*   AP  --   --  58  --  60   TP  --   --  6.6  --  6.7   ALB  --   --  3.4  --  3.6   GLOB  --   --  3.2  --  3.1   AGRAT  --   --  1.1  --  1.2    < > = values in this interval not displayed.       CBC w/Diff Recent Labs     12/27/21  0544 12/26/21  0728 12/25/21  0716 12/24/21  1450 12/24/21  1450 WBC 14.3* 10.8 10.1   < > 8.9   RBC 3.16* 3.22* 3.32*   < > 3.45*   HGB 9.8* 10.0* 10.2*   < > 10.6*   HCT 29.6* 30.6* 31.3*   < > 32.2*    126* 108*   < > 98*   GRANS  --   --  84*  --  79*   LYMPH  --   --  8*  --  9*   EOS  --   --  0  --  1    < > = values in this interval not displayed. Cardiac Enzymes Recent Labs     12/24/21  1450   CPK 34*      Coagulation Recent Labs     12/25/21  0611   PTP 14.5   INR 1.1   APTT 25.6       Lipid Panel Lab Results   Component Value Date/Time    Cholesterol, total 155 04/04/2019 09:22 AM    HDL Cholesterol 52 04/04/2019 09:22 AM    LDL, calculated 86 04/04/2019 09:22 AM    Triglyceride 87 04/04/2019 09:22 AM    CHOL/HDL Ratio 3.0 04/04/2019 09:22 AM      BNP No results for input(s): BNPP in the last 72 hours.    Liver Enzymes Recent Labs     12/25/21  0611   TP 6.6   ALB 3.4   AP 58      Thyroid Studies Lab Results   Component Value Date/Time    TSH 1.55 12/25/2021 06:11 AM        Procedures/imaging: see electronic medical records for all procedures/Xrays and details which were not copied into this note but were reviewed prior to creation of Plan

## 2021-12-27 NOTE — PROGRESS NOTES
[id number]-  Bedside and Verbal shift change report given to Kulwinder Holly RN (oncoming nurse) by Noni Moritz, RN (offgoing nurse). Report included the following information SBAR, Kardex, Intake/Output, MAR and Recent Results.

## 2021-12-27 NOTE — DIABETES MGMT
Diabetes/ Glycemic Control Plan of Care  Recommendations:   Blood glucose within target range. Continue basal and corrective insulin coverage as ordered  Will continue inpatient monitoring. Assessment:   DX:   1. Altered mental status, unspecified altered mental status type     2. ESRD on dialysis ([facility] [address]     3. Nausea and vomiting in adult patient     4. Hypoglycemia        Fasting/ Morning blood glucose:   Lab Results   Component Value Date/Time    Glucose 165 (H) 12/27/2021 05:44 AM    Glucose (POC) 177 (H) 12/27/2021 11:36 AM    Glucose (POC) 107 (H) 12/16/2021 12:29 PM     IV Fluids containing dextrose:  None   Steroids:  None      Blood glucose values:       Results for Mya Coronado (MRN [id number]) as of 12/27/2021 15:11   Ref. Range 12/26/2021 13:10 12/26/2021 17:17 12/26/2021 23:49 12/27/2021 05:31 12/27/2021 11:36   GLUCOSE,FAST - POC Latest Ref Range: 70 - 110 mg/dL 280 (H) 219 (H) 195 (H) 150 (H) 177 (H)     Within target range (70-180mg/dL):  progressing towards goal.  Current insulin orders:   lantus 3 units every bedtime  Lispro corrective insulin coverage as needed. Total Daily Dose previous 24 hours = 17 units  Current A1c:   Lab Results   Component Value Date/Time    Hemoglobin A1c 6.8 (H) 11/26/2021 02:04 AM      equivalent  to ave Blood Glucose of 148 mg/dl for 2-3 months prior to admission  Adequate glycemic control PTA:   Yes. Nutrition/Diet:   Active Orders   Diet    DIET NPO      Meal Intake:  No data found. Supplement Intake:  No data found. Home diabetes medications:   Key Antihyperglycemic Medications             insulin glargine (Semglee U-100 Insulin) 100 unit/mL injection 20 Units by SubCUTAneous route nightly.     insulin lispro (HUMALOG) 100 unit/mL injection 10 units before breakfast  12 units before lunch  15 units before dinner          Plan/Goals:   Blood glucose will be within target of 70 - 180 mg/dl within 72 hours  Reinforce dietary and medication compliance at home. Education:  [] Refer to Diabetes Education Record                          [x] Education not indicated at this time   Non-responsive and inappropriate for education at this time.  Resides in [facility], [address] [place] [address] CDE  Ext [phone]

## 2021-12-27 NOTE — PROGRESS NOTES
Spoke with nursing, patient continues to be not appropriate to participate in PT evaluation at this time. Will sign off. Please re-order when patients condition has improve.  Thank you for this referral.     Valerie Cote PT, DPT

## 2021-12-27 NOTE — PROGRESS NOTES
Third day attempt for OT evaluation. Pt remains non-responsive and not appropriate for OT intervention at this time. OT to sign off. Please re-order when pt is medically appropriate.

## 2021-12-27 NOTE — PROCEDURES
METHOD:    This is a 20 channel digital electroencephalogram utilizing the 10-20 International System of Electrode Placement with 1 channel of EKG recording. The record was read using reformatted bipolar and referential electrode montages. The patient was described as awake but not interactive or cooperative. RESULTS:    Background: In the most alert state, the background is notable for almost continuous semirhythmic generalized slow waves at [facility], 60-80uV which are morphologically consistent with triphasic waves. No posterior background rhythm is noted although some intermittent muscle artifact is seen. Other activity: At two points during the study, higher amplitude 70-90uV semi-rhythmic and at times rhythmic sharp waves at [facility] are noted to arise from the right frontotemporal region. This is consistent with a focal electrographic seizure. Photic stimulation: no response. EKG channel did not show any significant dysrhythmia. IMPRESSION:   This is an abnormal EEG due to the presence of marked diffuse background slowing associated with triphasic waves and additional focal electrographic seizures arising from the right hemisphere. These findings indicate two different processes: 1. A diffuse encephalopathic process which could could be from toxic, metabolic or parainfectious processes. 2. Focal cortical hyperexcitability in the right hemisphere and associated electrographic seizures. Martinez Steele M.D.   Neurologist/Neurophysiologist

## 2021-12-28 ENCOUNTER — HOSPITAL ENCOUNTER (INPATIENT)
Age: 56
LOS: 25 days | Discharge: HOME | DRG: 053 | End: 2022-01-22
Attending: STUDENT IN AN ORGANIZED HEALTH CARE EDUCATION/TRAINING PROGRAM | Admitting: HOSPITALIST
Payer: MEDICAID

## 2021-12-28 VITALS
OXYGEN SATURATION: 94 % | RESPIRATION RATE: 18 BRPM | DIASTOLIC BLOOD PRESSURE: 95 MMHG | SYSTOLIC BLOOD PRESSURE: 180 MMHG | TEMPERATURE: 99.1 F | HEART RATE: 70 BPM | BODY MASS INDEX: 21.22 KG/M2 | WEIGHT: 116 LBS

## 2021-12-28 DIAGNOSIS — R41.0 DISORIENTATION: ICD-10-CM

## 2021-12-28 DIAGNOSIS — N18.6 ESRD NEEDING DIALYSIS (HCC): ICD-10-CM

## 2021-12-28 DIAGNOSIS — Z99.2 ESRD NEEDING DIALYSIS (HCC): ICD-10-CM

## 2021-12-28 DIAGNOSIS — G40.901 STATUS EPILEPTICUS (HCC): ICD-10-CM

## 2021-12-28 PROBLEM — R41.82 AMS (ALTERED MENTAL STATUS): Status: ACTIVE | Noted: 2021-12-28

## 2021-12-28 PROBLEM — J69.0 ASPIRATION PNEUMONIA (HCC): Status: ACTIVE | Noted: 2021-12-28

## 2021-12-28 LAB
ANION GAP SERPL CALC-SCNC: 10 MMOL/L (ref 3–18)
BASOPHILS # BLD: 0 K/UL (ref 0–0.1)
BASOPHILS NFR BLD: 0 % (ref 0–2)
BUN SERPL-MCNC: 27 MG/DL (ref 7–18)
BUN/CREAT SERPL: 7 (ref 12–20)
CALCIUM SERPL-MCNC: 8.5 MG/DL (ref 8.5–10.1)
CHLORIDE SERPL-SCNC: 100 MMOL/L (ref 100–111)
CO2 SERPL-SCNC: 26 MMOL/L (ref 21–32)
CREAT SERPL-MCNC: 3.84 MG/DL (ref 0.6–1.3)
DIFFERENTIAL METHOD BLD: ABNORMAL
EOSINOPHIL # BLD: 0 K/UL (ref 0–0.4)
EOSINOPHIL NFR BLD: 0 % (ref 0–5)
ERYTHROCYTE [DISTWIDTH] IN BLOOD BY AUTOMATED COUNT: 15.2 % (ref 11.6–14.5)
GLUCOSE BLD STRIP.AUTO-MCNC: 175 MG/DL (ref 70–110)
GLUCOSE BLD STRIP.AUTO-MCNC: 198 MG/DL (ref 70–110)
GLUCOSE BLD STRIP.AUTO-MCNC: 267 MG/DL (ref 70–110)
GLUCOSE SERPL-MCNC: 199 MG/DL (ref 74–99)
HCT VFR BLD AUTO: 33.3 % (ref 36–48)
HGB BLD-MCNC: 10.5 G/DL (ref 13–16)
IMM GRANULOCYTES # BLD AUTO: 0.1 K/UL (ref 0–0.04)
IMM GRANULOCYTES NFR BLD AUTO: 1 % (ref 0–0.5)
LYMPHOCYTES # BLD: 0.6 K/UL (ref 0.9–3.6)
LYMPHOCYTES NFR BLD: 5 % (ref 21–52)
MCH RBC QN AUTO: 30.2 PG (ref 24–34)
MCHC RBC AUTO-ENTMCNC: 31.5 G/DL (ref 31–37)
MCV RBC AUTO: 95.7 FL (ref 78–100)
MONOCYTES # BLD: 1 K/UL (ref 0.05–1.2)
MONOCYTES NFR BLD: 8 % (ref 3–10)
NEUTS SEG # BLD: 10.2 K/UL (ref 1.8–8)
NEUTS SEG NFR BLD: 86 % (ref 40–73)
NRBC # BLD: 0 K/UL (ref 0–0.01)
NRBC BLD-RTO: 0 PER 100 WBC
PLATELET # BLD AUTO: 174 K/UL (ref 135–420)
PMV BLD AUTO: 11.5 FL (ref 9.2–11.8)
POTASSIUM SERPL-SCNC: 3.8 MMOL/L (ref 3.5–5.5)
RBC # BLD AUTO: 3.48 M/UL (ref 4.35–5.65)
SODIUM SERPL-SCNC: 136 MMOL/L (ref 136–145)
WBC # BLD AUTO: 11.9 K/UL (ref 4.6–13.2)

## 2021-12-28 PROCEDURE — 74011636637 HC RX REV CODE- 636/637: Performed by: EMERGENCY MEDICINE

## 2021-12-28 PROCEDURE — 99239 HOSP IP/OBS DSCHRG MGMT >30: CPT | Performed by: INTERNAL MEDICINE

## 2021-12-28 PROCEDURE — 74011250636 HC RX REV CODE- 250/636: Performed by: NURSE PRACTITIONER

## 2021-12-28 PROCEDURE — 74011636637 HC RX REV CODE- 636/637: Performed by: INTERNAL MEDICINE

## 2021-12-28 PROCEDURE — C9254 INJECTION, LACOSAMIDE: HCPCS | Performed by: HOSPITALIST

## 2021-12-28 PROCEDURE — 36415 COLL VENOUS BLD VENIPUNCTURE: CPT

## 2021-12-28 PROCEDURE — 65660000000 HC RM CCU STEPDOWN

## 2021-12-28 PROCEDURE — 74011000258 HC RX REV CODE- 258: Performed by: INTERNAL MEDICINE

## 2021-12-28 PROCEDURE — 80048 BASIC METABOLIC PNL TOTAL CA: CPT

## 2021-12-28 PROCEDURE — 74011000258 HC RX REV CODE- 258: Performed by: HOSPITALIST

## 2021-12-28 PROCEDURE — 85025 COMPLETE CBC W/AUTO DIFF WBC: CPT

## 2021-12-28 PROCEDURE — 74011250636 HC RX REV CODE- 250/636: Performed by: INTERNAL MEDICINE

## 2021-12-28 PROCEDURE — 82962 GLUCOSE BLOOD TEST: CPT

## 2021-12-28 PROCEDURE — 74011250636 HC RX REV CODE- 250/636: Performed by: HOSPITALIST

## 2021-12-28 PROCEDURE — 74011250637 HC RX REV CODE- 250/637: Performed by: NURSE PRACTITIONER

## 2021-12-28 RX ORDER — DEXTROSE MONOHYDRATE AND SODIUM CHLORIDE 5; .9 G/100ML; G/100ML
50 INJECTION, SOLUTION INTRAVENOUS CONTINUOUS
Status: DISCONTINUED | OUTPATIENT
Start: 2021-12-29 | End: 2021-12-29

## 2021-12-28 RX ORDER — LEVETIRACETAM 10 MG/ML
1000 INJECTION INTRAVASCULAR EVERY 12 HOURS
Status: DISCONTINUED | OUTPATIENT
Start: 2021-12-29 | End: 2021-12-28

## 2021-12-28 RX ORDER — LEVOFLOXACIN 5 MG/ML
500 INJECTION, SOLUTION INTRAVENOUS
Qty: 3333 ML | Refills: 0 | Status: SHIPPED
Start: 2021-12-29 | End: 2022-01-22

## 2021-12-28 RX ORDER — MAGNESIUM SULFATE 100 %
4 CRYSTALS MISCELLANEOUS AS NEEDED
Status: DISCONTINUED | OUTPATIENT
Start: 2021-12-28 | End: 2022-01-22 | Stop reason: HOSPADM

## 2021-12-28 RX ORDER — ONDANSETRON 4 MG/1
4 TABLET, ORALLY DISINTEGRATING ORAL
Status: DISCONTINUED | OUTPATIENT
Start: 2021-12-28 | End: 2022-01-22 | Stop reason: HOSPADM

## 2021-12-28 RX ORDER — ACETAMINOPHEN 650 MG/1
650 SUPPOSITORY RECTAL
Status: DISCONTINUED | OUTPATIENT
Start: 2021-12-28 | End: 2022-01-22 | Stop reason: HOSPADM

## 2021-12-28 RX ORDER — INSULIN GLARGINE 100 [IU]/ML
3 INJECTION, SOLUTION SUBCUTANEOUS DAILY
Qty: 1 ML | Refills: 0 | Status: SHIPPED
Start: 2021-12-28 | End: 2022-01-22

## 2021-12-28 RX ORDER — INSULIN LISPRO 100 [IU]/ML
INJECTION, SOLUTION INTRAVENOUS; SUBCUTANEOUS
Status: DISCONTINUED | OUTPATIENT
Start: 2021-12-29 | End: 2022-01-22 | Stop reason: HOSPADM

## 2021-12-28 RX ORDER — LEVETIRACETAM 10 MG/ML
1000 INJECTION INTRAVASCULAR EVERY 12 HOURS
Qty: 33333 ML | Refills: 0 | Status: SHIPPED
Start: 2021-12-29 | End: 2022-01-22

## 2021-12-28 RX ORDER — LEVOFLOXACIN 5 MG/ML
500 INJECTION, SOLUTION INTRAVENOUS
Status: DISCONTINUED | OUTPATIENT
Start: 2021-12-30 | End: 2022-01-02

## 2021-12-28 RX ORDER — HEPARIN SODIUM 1000 [USP'U]/ML
3400 INJECTION, SOLUTION INTRAVENOUS; SUBCUTANEOUS
Status: CANCELLED | OUTPATIENT
Start: 2021-12-27

## 2021-12-28 RX ORDER — DEXTROSE 50 % IN WATER (D50W) INTRAVENOUS SYRINGE
12.5-25 AS NEEDED
Status: DISCONTINUED | OUTPATIENT
Start: 2021-12-28 | End: 2022-01-22 | Stop reason: HOSPADM

## 2021-12-28 RX ORDER — ACETAMINOPHEN 325 MG/1
650 TABLET ORAL
Status: DISCONTINUED | OUTPATIENT
Start: 2021-12-28 | End: 2022-01-22 | Stop reason: HOSPADM

## 2021-12-28 RX ORDER — HYDRALAZINE HYDROCHLORIDE 20 MG/ML
10 INJECTION INTRAMUSCULAR; INTRAVENOUS
Status: DISCONTINUED | OUTPATIENT
Start: 2021-12-28 | End: 2022-01-22 | Stop reason: HOSPADM

## 2021-12-28 RX ORDER — ONDANSETRON 2 MG/ML
4 INJECTION INTRAMUSCULAR; INTRAVENOUS
Status: DISCONTINUED | OUTPATIENT
Start: 2021-12-28 | End: 2022-01-22 | Stop reason: HOSPADM

## 2021-12-28 RX ADMIN — HEPARIN SODIUM 5000 UNITS: 5000 INJECTION INTRAVENOUS; SUBCUTANEOUS at 00:31

## 2021-12-28 RX ADMIN — LEVETIRACETAM 1000 MG: 10 INJECTION INTRAVENOUS at 06:31

## 2021-12-28 RX ADMIN — Medication 10 ML: at 00:37

## 2021-12-28 RX ADMIN — Medication 10 ML: at 06:33

## 2021-12-28 RX ADMIN — LEVETIRACETAM 1000 MG: 10 INJECTION INTRAVENOUS at 18:47

## 2021-12-28 RX ADMIN — FAMOTIDINE 20 MG: 20 TABLET ORAL at 08:04

## 2021-12-28 RX ADMIN — HEPARIN SODIUM 5000 UNITS: 5000 INJECTION INTRAVENOUS; SUBCUTANEOUS at 06:30

## 2021-12-28 RX ADMIN — Medication 6 UNITS: at 06:30

## 2021-12-28 RX ADMIN — LEVETIRACETAM 1000 MG: 10 INJECTION INTRAVENOUS at 00:32

## 2021-12-28 RX ADMIN — HEPARIN SODIUM 5000 UNITS: 5000 INJECTION INTRAVENOUS; SUBCUTANEOUS at 13:46

## 2021-12-28 RX ADMIN — Medication 3 UNITS: at 11:43

## 2021-12-28 RX ADMIN — SODIUM CHLORIDE 100 MG: 9 INJECTION, SOLUTION INTRAVENOUS at 13:46

## 2021-12-28 RX ADMIN — Medication 2 UNITS: at 00:30

## 2021-12-28 RX ADMIN — Medication 10 ML: at 13:46

## 2021-12-28 RX ADMIN — THIAMINE HYDROCHLORIDE 250 MG: 100 INJECTION, SOLUTION INTRAMUSCULAR; INTRAVENOUS at 08:04

## 2021-12-28 RX ADMIN — SODIUM CHLORIDE 100 MG: 9 INJECTION, SOLUTION INTRAVENOUS at 02:26

## 2021-12-28 RX ADMIN — LEVOFLOXACIN 500 MG: 500 INJECTION, SOLUTION INTRAVENOUS at 00:31

## 2021-12-28 RX ADMIN — ACYCLOVIR SODIUM 250 MG: 500 INJECTION, SOLUTION INTRAVENOUS at 11:43

## 2021-12-28 NOTE — DIALYSIS
ACUTE HEMODIALYSIS TREATMENT    HEMODIALYSIS ORDERS: Physician: Dr. Tash Donnellybing: Revaclear   Duration: 3.5 hr   BFR: 350   DFR: 600   Dialysate:  Temp 36-37*C   K+  3    Ca+ 2.5   Na 138   Bicarb    Wt Readings from Last 1 Encounters:   12/27/21 58.1 kg (128 lb)    Patient Chart [x]   Unable to Obtain []  Dry weight/UF Goal: 2000 ml    Heparin []  Bolus    Units    [] Hourly    Units    [x]None       Pre BP:   164/96   Pulse:  71   Respirations: 18   Temp:  97.2  [] Oral [] Axillary [] Esophageal   Labs: []  Pre  []  Post:   [x] N/A   Additional Orders(medications, blood products, hypotension management): [] Yes   [] No     [x]  Javidita Consent Verified     CATHETER ACCESS:  []N/A   [x]Right   []Left   []IJ   []Fem  [x]Chest wall  []TransHepatic   [] First use X-ray verified     [x]Tunnel    [] Non Tunneled   [x]No S/S infection  []Redness  []Drainage []Cultured []Swelling []Pain   [x]Medical Aseptic Prep Utilized   []Dressing Changed  [x] Biopatch  Date:    []Clotted   [x]Patent   Flows: [x]Good  []Poor  []Reversed   If access problem,  notified: []Yes    [x]N/A        GRAFT/FISTULA ACCESS:   [x]N/A     []Right     []Left     []UE     []LE   []AVG   []AVF       [x]Medical Aseptic Prep Utilized   []No S/S infection  []Redness  []Drainage [] Cultured  [] Swelling  [] Pain  Bruit:   [] Strong    [] Weak       Thrill :   [] Strong    [] Weak     Needle Gauge: 15   Length: 1 inch   If access problem,  notified: []Yes     [x]N/A          GENERAL ASSESSMENT:    LUNGS:  Resp Rate 18   [] Clear  [x] Coarse  [] Crackles  [] Wheezing  [x] Diminished         Respirations:  [x]Easy  []Labored  []N/A  Cough:  []Productive  []Dry  [x]N/A      Therapy:  [x]RA  O2 Type:  []NC  Mask: []  NRB    [] BiPaP  Flow:  l/min                   [] Ventilated  [] Intubated  [] Trach     CARDIAC: [] Regular      [] Irregular   [] Rhythm:          [] Monitored   [] Bedside   [x] Remotely monitored     EDEMA: [] None [x]Generalized  [] Pitting [] 1+   [] 2 +   [] 3+    [] 4+  [] Pedal    SKIN:   [x] Warm  [] Hot     [] Cold   [x] Dry     [] Pale   [] Diaphoretic                  [] Flushed  [] Jaundiced  [] Cyanotic     LOC:    [] Alert      []Oriented:    [] Person     [] Place  []Time               [] Confused  [x] Lethargic  [] Medicated  [] Non-responsive  [x] Non Verbal   GI / ABDOMEN:                     [] Flat    [] Distended    [x] Soft    [] Firm   []  Obese                   [] Diarrhea   [] FMS [x] Bowel Sounds  [] Nausea  [] Vomiting                   [] NGT  [] OGT    [] PEG  [] Tube Feedings @     / URINE ASSESSMENT:                   [] Voiding  []  Garcia  [x] Oliguria  [] Anuria                     [] Incontinent  []  Incontinent Brief   []  PureWick     PAIN:  [x] 0 []1  []2   []3   []4   []5   []6   []7   []8   []9   []10                MOBILITY:  [x] Bed    [] Stretcher      All Vitals and Treatment Details on Attached [address]: [facility]          Room # 355/01    [x] Routine         [] 1st Time Acute/Chronic   [] Urgent      [] Stat            [] Acute Room   []  Bedside    [] ICU/CCU     [] ER   Isolation Precautions:  [x] Dialysis    There are currently no Active Isolations     ALLERGIES:     No Known Allergies   Code Status:  Full Code     Hepatitis Status      Lab Results   Component Value Date/Time    Hepatitis A, IgM NON-REACTIVE 04/04/2019 09:22 AM    Hepatitis B surface Ag <0.10 12/25/2021 07:16 AM    Hepatitis B surface Ab <3.10 (L) 12/25/2021 07:16 AM    Hep B Core Ab,Total Nonreactive 12/08/2021 01:46 AM    Hepatitis B core, IgM NON-REACTIVE 04/04/2019 09:22 AM    Hep B Core Ab, total Negative 12/25/2021 07:16 AM    Hepatitis C virus Ab NON-REACTIVE 04/04/2019 09:22 AM        Current Labs:      Lab Results   Component Value Date/Time    WBC 14.3 (H) 12/27/2021 05:44 AM    HGB 9.8 (L) 12/27/2021 05:44 AM    HCT 29.6 (L) 12/27/2021 05:44 AM    PLATELET 357 29/09/3079 05:44 AM    MCV 93.7 12/27/2021 05:44 AM     Lab Results   Component Value Date/Time    Sodium 135 (L) 12/27/2021 05:44 AM    Potassium 3.8 12/27/2021 05:44 AM    Chloride 100 12/27/2021 05:44 AM    CO2 23 12/27/2021 05:44 AM    Anion gap 12 12/27/2021 05:44 AM    Glucose 165 (H) 12/27/2021 05:44 AM    BUN 56 (H) 12/27/2021 05:44 AM    Creatinine 6.78 (H) 12/27/2021 05:44 AM    BUN/Creatinine ratio 8 (L) 12/27/2021 05:44 AM    GFR est AA 10 (L) 12/27/2021 05:44 AM    GFR est non-AA 9 (L) 12/27/2021 05:44 AM    Calcium 8.6 12/27/2021 05:44 AM          DIET:  DIET NPO     PRIMARY NURSE REPORT:   Pre Dialysis: Taylor Nance RN    Time: 2000      EDUCATION:    [x] Patient           Knowledge Basis: [x]None []Minimal [] Substantial [] Unknown  Barriers to learning Non-responsive []N/A  [] Intubated/Trached/Ventilated  [] Sedated/Paralyzed   [] Access Care     [] S&S of infection  [] Fluid Management  [] K+   [x] Procedural    [] Medications   [] Tx Options   [] Transplant   [] Diet      Teaching Tools:  [x] Explain  [] Demo  [] Handouts [] Video  Patient response: [] Verbalized understanding   [x] Requires follow up        [x] Time Out/Safety Check    [x] Extracorporeal Circuit Tested for integrity       RO/HEMODIALYSIS MACHINE SAFETY CHECKS  Before each treatment:        [address] [place] [address]                                     [] Unit Machine #  with centralized RO                                    [] Portable Machine #1/RO serial # [id number]                                  [x] Portable Machine #2/RO serial # [id number]                                  [] Portable Machine #4/RO serial # [id number]                                  [] Portable Machine #10/RO serial #  [id number]                                                                                                         Alarm Test:  Pass time 2018            [x] RO/Machine Log Complete    Machine Temp    36-37*C             Dialysate: pH 7.4    Conductivity: Meter 14   HD Machine  14 TCD: 13.8  Dialyzer Lot # [id number]     Blood Tubing Lot # [id number]     Saline Lot # [id number]     CHLORINE TESTING-Before each treatment and every 4 hours    Total Chlorine: [x] less than 0.1 ppm  Initial Time Check: 2000       4 Hr/2nd Check Time:    (if greater than 0.1 ppm from Primary then every 30 minutes from Secondary)     TREATMENT INITIATION  with Dialysis Precautions:   [x] All Connections Secured              [x] Saline Line Double Clamped   [x] Venous Parameters Set               [x] Arterial Parameters Set    [x] Prime Given 250ml NSS              [x]Air Foam Detector Engaged      Treatment Initiation Note:  Received Patient in bed eyes closed responds to tactile stimulus no response to voice. VSS stable for treatment. Rt. Sub TDC Accessed with no signs or symptoms of complication. Treatment initiated      During Treatment Notes:  2045  Vascular access visible with arterial and venous line connections intact. Pt resting comfortably. 2100  Vascular access visible with arterial and venous line connections intact. Pt resting comfortably. 2130  Vascular access visible with arterial and venous line connections intact. Pt resting comfortably. 2145  Vascular access visible with arterial and venous line connections intact. Pt resting comfortably. 2200  Vascular access visible with arterial and venous line connections intact. Pt resting comfortably. 2215  Vascular access visible with arterial and venous line connections intact. Pt resting comfortably. 2230  Vascular access visible with arterial and venous line connections intact. Pt resting comfortably. 2245  Vascular access visible with arterial and venous line connections intact. Pt resting comfortably. 2300  Vascular access visible with arterial and venous line connections intact. Pt resting comfortably. 2315  Vascular access visible with arterial and venous line connections intact. Pt resting comfortably.   2330  Vascular access visible with arterial and venous line connections intact. Pt resting comfortably. 2345  Vascular access visible with arterial and venous line connections intact. Pt resting comfortably. 0000  Dialysis treatment complete. Medication Dose Volume Route Time Geoff Nurse, Title      KASSIE Reaves, RN      KASSIE Reaves, RN      KASSIE Reaves, RN     Post Assessment  Dialyzer Cleared:   [x] Good  [] Fair  [] Poor  Blood processed:  67.7 L  UF Removed:  2000 Ml    Post /80   Pulse  62 Resp  18  Temp 97.8 Lungs: [] Clear [] Course  [] Crackles                 []  Wheezing   [x] Diminished   Post Tx Vascular Access: [] N/A  AVF/AVG: Bleeding stopped with  Arterial Pressure for  min   Venous Pressure for  min      Cardiac :[] Regular   [] Irregular   Rhythm:  [x] Monitored   [] Not Monitored    CVC Catheter: [] N/A  Locking solution: Heparin 1:1000 U  Arterial port 1.6 ml   Venous port 1.7 ml   Edema:  [] None  [x] Generalized                     Skin:[x] Warm  [x] Dry [] Diaphoretic               [] Flushed  [] Pale [] Cyanotic Pain:  [x]0  []1 []2  []3 []4  []5  []6  []7 []8    []9  []10     Post Treatment Note:    Patient completed and  Tolerated 3.5 hrs of hemo dialysis. 2000 ml of fluid removed. Patient remains in the room in stable condition.      POST TREATMENT PRIMARY NURSE HANDOFF REPORT:   Post Dialysis: Troy Gonzalez RN               Time:  0020       Abbreviations: AVG-arterial venous graft, AVF-arterial venous fistula, IJ-Internal Jugular, Subcl-Subclavian, Fem-Femoral, Tx-treatment, AP/HR-apical heart rate, VSS- Vital Signs Stable, CVC- Central Venous Catheter, DFR-dialysate flow rate, BFR-blood flow rate, AP-arterial pressure, -venous pressure, UF-ultrafiltrate, TMP-transmembrane pressure, Garrett-Venous, Art-Arterial, RO-Reverse Osmosis

## 2021-12-28 NOTE — PROGRESS NOTES
1955: Paged Dr. Maximo Rolon. Awaiting call back. 2025: Called nursing supervisor regarding no call back at this time. Advised to call on call hospitalist.     2028: Paged hospitalist. Suzan Falcon to Dr. Clifton Kowalski regarding transfer orders placed. Given verbal order to cancel transfer orders at this time. Verbal orders given for PRN ativan 2mg Q6h for active seizures. Will page on call doctor if needed at later time. 0155: Called answering service. Spoke with [facility] regarding transporting patient. Transportation is set up. Will notify officer of transport being set up.     0215: Amador Bah called from answering service stating that [facility] will arrive within 1 hour to transfer patient. 0220: Upon entering room, officer states that nurse must speak with Sukhdeep Madison regarding transfer. Spoke to Sukhdeep Madison and was informed that transfer cannot be completed without approval from commander and medical. They are currently not at the facility and will not arrive until appx. 0830. Also, transportation for a patrol car to follow must be set up prior to departure. 0240: Nursing admin on unit. Clarification provided regarding transfer to facility. 80: Called answering service and spoke with [facility] regarding cancellation of transportation at this time. [facility] spoke with nursing supervisors at receiving facility and they stated they will hold bed for patient. Will pass along information to day shift nurse.

## 2021-12-28 NOTE — PROGRESS NOTES
attempted an initial visit, but the patient was asleep and unable to be assessed at this time. Spiritual Care materials left at bedside. Subha Linares will continue to follow and provide pastoral care as needed or requested. .     Rev. Argyle Meckel, MDiv.   Subha Linares  [phone]

## 2021-12-28 NOTE — PROGRESS NOTES
[facility] ([phone]) to update that approval for transfer was received per the primary RN and now a bed is needed.

## 2021-12-28 NOTE — PROGRESS NOTES
SLP Note:    Pt continues to be inappropriate for po presentations due to mental status. Will sign off. Please re-consult as appropriate.      Ana Marks M.S., [address] [facility]  Speech-Language Pathologist
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
## 2021-12-28 NOTE — DIABETES MGMT
Diabetes/ Glycemic Control Plan of Care  Recommendations:   Blood glucose 267 mg/dl this am.  Noted lantus dose held last night related to no oral intake and hx of hypoglycemia. Noted IVF of D5NS infusing at 50 ml/hr. Recommend un-holding lantus dose  Continue  corrective insulin coverage as needed  Advanced to very insulin resistant dosing. Will continue inpatient monitoring. Noted may transfer to new facility today. Assessment:   DX:   1. Altered mental status, unspecified altered mental status type     2. ESRD on dialysis ([facility] [address]     3. Nausea and vomiting in adult patient     4. Hypoglycemia        Fasting/ Morning blood glucose:   Lab Results   Component Value Date/Time    Glucose 199 (H) 12/28/2021 04:03 AM    Glucose (POC) 267 (H) 12/28/2021 06:21 AM    Glucose (POC) 107 (H) 12/16/2021 12:29 PM     IV Fluids containing dextrose:  D5NS @ 50 ml/hr   Steroids:  None      Blood glucose values:       Results for Eunice Lee (MRN [id number]) as of 12/27/2021 15:11   Ref. Range 12/26/2021 13:10 12/26/2021 17:17 12/26/2021 23:49 12/27/2021 05:31 12/27/2021 11:36   GLUCOSE,FAST - POC Latest Ref Range: 70 - 110 mg/dL 280 (H) 219 (H) 195 (H) 150 (H) 177 (H)     Within target range (70-180mg/dL):  progressing towards goal.  Current insulin orders:   lantus 3 units every bedtime (currently on hold)  Lispro corrective insulin coverage as needed. Total Daily Dose previous 24 hours = 6 units  Current A1c:   Lab Results   Component Value Date/Time    Hemoglobin A1c 6.8 (H) 11/26/2021 02:04 AM      equivalent  to ave Blood Glucose of 148 mg/dl for 2-3 months prior to admission  Adequate glycemic control PTA:   Yes. Nutrition/Diet:   Active Orders   Diet    DIET NPO      Meal Intake:  No data found. Supplement Intake:  No data found.     Home diabetes medications:   Key Antihyperglycemic Medications             insulin glargine (Semglee U-100 Insulin) 100 unit/mL injection 20 Units by SubCUTAneous route nightly. insulin lispro (HUMALOG) 100 unit/mL injection 10 units before breakfast  12 units before lunch  15 units before dinner          Plan/Goals:   Blood glucose will be within target of 70 - 180 mg/dl within 72 hours  Reinforce dietary and medication compliance at home. Education:  [] Refer to Diabetes Education Record                          [x] Education not indicated at this time   Non-responsive and inappropriate for education at this time.  Resides in [facility], [address] [place] [address] CDE  Ext [phone]

## 2021-12-28 NOTE — CONSULTS
Neurology Consultation Follow Up Note     S - Juan Jose Jasso is a 49 y.o. WM w/PMHx of HTN, T1D c/b ESRD, and epilepsy? who p/w aspiration PNA and shock, followed by multi-factorial encephalopathy and epileptic activity. Neurology was consulted on 12/25 with clear CSF but EEG concerning for seizure activity. He was placed on LEV and LCM. Overnight, the patient continued to have rhythmic eye movements and failed to return to normal sensorium, although unclear baseline. LEV was further optimized. O - Vitals signs and nursing notes reviewed. Medications reviewed. Imaging reviewed. Labs reviewed. Neurological examination - lying in bed, not in acute distress, opens eyes to loud noise, tracks. Not following commands, moved limbs to noxious stimuli. PERRL, visual fields intact to confrontation. A - 49 y.o. WM p/w asp PNA, shock, AMS and found to have epileptiform activity current on two AEDs (LEV and LCM). The neurological examination is without rhythmic activity anymore. P - Frequent neurochecks. Continue LEV 1000/1000, /100 (may increase to 150/150 with 100 mg IV bolus should rhythmic activity return). Also, primary team working on tx to OSH for cont. EEG monitoring and EEG-based titration of medications, assessment of NCSE, etc.     Thank you for the consult. Rubén Jerez MD, PhD, [facility] Neurologist, [facility]

## 2021-12-28 NOTE — PROGRESS NOTES
Progress Note    Sharad Davidson  49 y.o. Admit Date: 12/24/2021  Active Problems:    Hypoglycemia (11/25/2021) POA: Unknown      EUSEBIA (acute kidney injury) ([facility] [address] (11/25/2021) POA: Yes      Acute metabolic encephalopathy (09/12/9182) POA: Unknown      ATN (acute tubular necrosis) ([facility] [address] (12/25/2021) POA: Unknown      Interstitial nephritis determined by biopsy (12/25/2021) POA: Unknown      Thrombocytopenia ([facility] [address] (12/25/2021) POA: Unknown      Hypertension (12/25/2021) POA: Unknown      Sepsis ([facility] (12/25/2021) POA: Unknown            Subjective:     Remained Unresponsive, no problem with breathing, was Dialyzed last night & taken off 2 Liters. Reviewed Dialysis flow sheet. No fever, on Seizure precautions. Making urine through Garcia      A comprehensive review of systems was negative except for that written in the History of Present Illness.     Objective:     Visit Vitals  BP (!) 170/90   Pulse 73   Temp 97.5 °F (36.4 °C)   Resp 18   Wt 53.1 kg (117 lb)   SpO2 94%   BMI 21.40 kg/m²         Intake/Output Summary (Last 24 hours) at 12/28/2021 1217  Last data filed at 12/28/2021 1011  Gross per 24 hour   Intake 0 ml   Output 3000 ml   Net -3000 ml       Current Facility-Administered Medications   Medication Dose Route Frequency Provider Last Rate Last Admin    dextrose 5% and 0.9% NaCl infusion  50 mL/hr IntraVENous CONTINUOUS Fela Casper MD 50 mL/hr at 12/27/21 1851 50 mL/hr at 12/27/21 1851    levETIRAcetam in saline (iso-os) (KEPPRA) infusion 1,000 mg  1,000 mg IntraVENous Q12H Fela Casper MD   1,000 mg at 12/28/21 0631    thiamine (B-1) 250 mg in 0.9% sodium chloride 50 mL IVPB  250 mg IntraVENous DAILY Fela Casper  mL/hr at 12/28/21 0804 250 mg at 12/28/21 0804    LORazepam (ATIVAN) injection 2 mg  2 mg IntraVENous Q6H PRN Katty Marshall N, DO        acyclovir (ZOVIRAX) 250 mg in 0.9% sodium chloride 100 mL IVPB  5 mg/kg (Ideal) IntraVENous Q24H Francia Garcia  mL/hr at 12/28/21 1143 250 mg at 12/28/21 1143    lacosamide (VIMPAT) 100 mg in 0.9% sodium chloride 100 mL IVPB  100 mg IntraVENous Q12H Cathleen Salazar  mL/hr at 12/28/21 0226 100 mg at 12/28/21 0226    VANCOMYCIN INFORMATION NOTE   Other Rx Dosing/Monitoring Cathleen Salazar MD        insulin glargine (LANTUS) injection 3 Units  3 Units SubCUTAneous QHS Cathleen Salazar MD   3 Units at 12/26/21 2114    levoFLOXacin (LEVAQUIN) 500 mg in D5W IVPB  500 mg IntraVENous Q48H Cathleen Salazar  mL/hr at 12/28/21 0031 500 mg at 12/28/21 0031    sodium chloride (NS) flush 5-10 mL  5-10 mL IntraVENous PRN Roya Ace PA        glucose chewable tablet 16 g  4 Tablet Oral PRN Mitchell-Holston, Simpson Lundborg, NP        glucagon (GLUCAGEN) injection 1 mg  1 mg IntraMUSCular PRN Mitchell-Holston, Simpson Lundborg, NP        dextrose (D50W) injection syrg 12.5-25 g  25-50 mL IntraVENous PRN Mitchell-Holston, Simpson Lundborg, NP   25 g at 12/25/21 0051    sodium chloride (NS) flush 5-40 mL  5-40 mL IntraVENous Q8H Anne Marie Kingsley NP   10 mL at 12/28/21 5452    sodium chloride (NS) flush 5-40 mL  5-40 mL IntraVENous PRN Mitchell-Holston, Simpson Lundborg, NP        acetaminophen (TYLENOL) tablet 650 mg  650 mg Oral Q6H PRN Mitchell-Holston, Simpson Lundborg, NP        Or    acetaminophen (TYLENOL) suppository 650 mg  650 mg Rectal Q6H PRN Mitchell-Holston, Simpson Lundborg, NP   650 mg at 12/26/21 0313    polyethylene glycol (MIRALAX) packet 17 g  17 g Oral DAILY PRN Mitchell-Holston, Simpson Lundborg, NP        ondansetron (ZOFRAN ODT) tablet 4 mg  4 mg Oral Q8H PRN Anne Marie Kingsley NP        Or    ondansetron (ZOFRAN) injection 4 mg  4 mg IntraVENous Q6H PRN Anne Marie Kingsley NP        famotidine (PEPCID) tablet 20 mg  20 mg Oral DAILY Anne Marie Kingsley NP   20 mg at 12/28/21 0804    heparin (porcine) injection 5,000 Units  5,000 Units SubCUTAneous Q8H Anne Marie Kingsley NP   5,000 Units at 12/28/21 0630    insulin lispro (HUMALOG) injection   SubCUTAneous Q6H Fela Casper MD   3 Units at 12/28/21 1143    hydrALAZINE (APRESOLINE) 20 mg/mL injection 10 mg  10 mg IntraVENous Q6H PRN Shukri Desai MD   10 mg at 12/27/21 0003        Physical Exam:     Physical Exam:   General:  Unresponsive, no distress, appears stated age. Neck: Supple, symmetrical, trachea midline, no adenopathy, thyroid: no enlargement/tenderness/nodules, no carotid bruit and no JVD. Lungs:   Clear to auscultation bilaterally. Heart:  Regular rate and rhythm, S1, S2 normal, no murmur, click, rub or gallop. Abdomen:   Soft, non-tender. Bowel sounds normal. No masses,  No organomegaly. Extremities: Extremities normal, atraumatic, no cyanosis or edema, TDC in right IJV   Skin: Skin color, texture, turgor normal. No rashes or lesions         Data Review:    CBC w/Diff    Recent Labs     12/28/21 0403 12/27/21 0544 12/27/21 0544 12/26/21 0728 12/26/21  0728   WBC 11.9  --  14.3*  --  10.8   RBC 3.48*  --  3.16*  --  3.22*   HGB 10.5*  --  9.8*  --  10.0*   HCT 33.3*  --  29.6*  --  30.6*   MCV 95.7   < > 93.7   < > 95.0   MCH 30.2   < > 31.0   < > 31.1   MCHC 31.5   < > 33.1   < > 32.7   RDW 15.2*   < > 14.9*   < > 15.2*    < > = values in this interval not displayed. Recent Labs     12/28/21 0403   MONOS 8   EOS 0   BASOS 0   RDW 15.2*        Comprehensive Metabolic Profile    Recent Labs     12/28/21 0403 12/27/21 0544 12/26/21  0728    135* 135*   K 3.8 3.8 3.6    100 98*   CO2 26 23 24   BUN 27* 56* 40*   CREA 3.84* 6.78* 5.41*    Recent Labs     12/28/21 0403 12/27/21 0544 12/26/21  0728   CA 8.5 8.6 8.4*        All cultures are negative.               Impression:       Active Hospital Problems    Diagnosis Date Noted    ATN (acute tubular necrosis) ([facility] 12/25/2021    Interstitial nephritis determined by biopsy 12/25/2021    Thrombocytopenia ([facility] 12/25/2021    Hypertension 12/25/2021    Sepsis ([facility] 12/25/2021    Acute metabolic encephalopathy 58/72/6414    Hypoglycemia 11/25/2021    EUSEBIA (acute kidney injury) ([facility] [address] 11/25/2021      - 49 y.o. WM p/w asp PNA, shock, AMS and found to have epileptiform activity current on two AEDs (LEV and LCM). The neurological examination is without rhythmic activity anymore.     P - Frequent neurochecks. Continue LEV 1000/1000, /100 (may increase to 150/150 with 100 mg IV bolus should rhythmic activity return). Also, primary team working on tx to OSH for cont.  EEG monitoring and EEG-based titration of medications, assessment of NCSE, etc.      Plan:     No need for any dialysis today, will dialyze him tomorrow,  Follow Neurology & ID's recommendations      Majo Christianson MD

## 2021-12-28 NOTE — PROGRESS NOTES
4673-  Bedside and Verbal shift change report given to Rigoberto Lucas, RN (oncoming nurse) by Magda Child, RN (offgoing nurse). Report included the following information SBAR, Kardex, Intake/Output, MAR and Recent Results. 7718- Received call from radiology stating that they had received a call from Dr. Criselda Camacho yesterday requesting that IR place an NG tube but that theere is no order for it. Paged Dr. Criselda Camacho for clarification and to place the order if still needed. 9855-  Bedside and Verbal shift change report given to Donal Briseno, RN (oncoming nurse) by Rigoberto Lucas RN (offgoing nurse). Report included the following information SBAR, Kardex, Intake/Output, MAR and Recent Results.

## 2021-12-28 NOTE — PROGRESS NOTES
Problem: Pressure Injury - Risk of  Goal: *Prevention of pressure injury  Description: Document Loco Scale and appropriate interventions in the flowsheet. Outcome: Progressing Towards Goal  Note: Pressure Injury Interventions:  Sensory Interventions: Assess changes in LOC,Assess need for specialty bed,Minimize linen layers         Activity Interventions: Assess need for specialty bed,Pressure redistribution bed/mattress(bed type)    Mobility Interventions: Assess need for specialty bed,Pressure redistribution bed/mattress (bed type)    Nutrition Interventions: Document food/fluid/supplement intake    Friction and Shear Interventions: Apply protective barrier, creams and emollients,HOB 30 degrees or less,Minimize layers                Problem: Falls - Risk of  Goal: *Absence of Falls  Description: Document Nakia Fall Risk and appropriate interventions in the flowsheet.   Outcome: Progressing Towards Goal  Note: Fall Risk Interventions:  Mobility Interventions: Bed/chair exit alarm,Patient to call before getting OOB    Mentation Interventions: Adequate sleep, hydration, pain control,Bed/chair exit alarm    Medication Interventions: Bed/chair exit alarm,Patient to call before getting OOB    Elimination Interventions: Bed/chair exit alarm,Call light in reach

## 2021-12-28 NOTE — PROGRESS NOTES
Physician Progress Note      PATIENTVenson Manual  CSN #:                  [id number]  :                       1965  ADMIT DATE:       2021 2:11 PM  [address] DATE:  RESPONDING  PROVIDER #:        Christian Kiran MD          QUERY TEXT:    Pt admitted with low-grade fevers and metabolic encephalopathy. Noted documentation of ATN on 21 by Nephrology consultant. If possible, please document in progress notes and discharge summary:    The medical record reflects the following:  Risk Factors: ESRD; Tupe 1 DM  Clinical Indicators: Nephrology consult:  Acute renal failure with a biopsy-proven acute tubular necrosis and interstitial nephritis  Serial creatinine since admission:  4.61, 5.73, 5.41, 6.78, 3.84  Treatment: Nephrology consult; dialysis as needed    Thank you,  Lola Small RN/[facility]  [phone]  Options provided:  -- ATN confirmed present on admission  -- ATN confirmed not present on admission  -- Defer to Nephrology consultant documentation regarding ATN  -- Other - I will add my own diagnosis  -- Disagree - Not applicable / Not valid  -- Disagree - Clinically unable to determine / Unknown  -- Refer to Clinical Documentation Reviewer    PROVIDER RESPONSE TEXT:    The diagnosis of ATN was confirmed as present on admission.     Query created by: Mary Damico on 2021 1:34 PM      Electronically signed by:  Christian Kiran MD 2021 2:27 PM

## 2021-12-28 NOTE — ROUTINE PROCESS
Bedside shift change report given to Dusty RN/Candis RN (oncoming nurse) by José Antonio Thacker (offgoing nurse). Report included the following information SBAR, Kardex, Intake/Output, MAR and Cardiac Rhythm NSR        1900 pt resting quietly with no distress    1920 Keppra not given on time because dialysis nurse was on the bedside    1940 talked to ICU NP Omid Lambert she was consulted by DR Yulissa Devi to examine the pt,NP Omid Lambert stated that they are not capable of continuous EEG monitoring and so the patient has to tranfer to a different facility. 0200 spoke with Miguel Vera and she stated that the patient will transfer to [facility]. Bedside shift change report given to José Antonio Thacker (oncoming nurse) by Mike Mina RN/Dusty RN (offgoing nurse). Report included the following information SBAR, Kardex, MAR and Cardiac Rhythm NSR.

## 2021-12-28 NOTE — PROGRESS NOTES
conducted a Follow up consultation and Spiritual Assessment for Yusef Quinteros, who is a 49 y.o.,male. The  provided the following Interventions:  Continued the relationship of care and support. Patient was in deep sleep during the visit. Offered prayer and assurance of continued prayer on patient's behalf. Chart reviewed. The following outcomes were achieved:   in the room expressed gratitude for 's visit. Assessment:  There are no further spiritual or Christian issues which require Spiritual Care Services interventions at this time. Plan:  Chaplains will continue to follow and will provide pastoral care on an as needed/requested basis.  recommends bedside caregivers page  on duty if patient shows signs of acute spiritual or emotional distress.      Kimberlyn Arita [address]   [phone]

## 2021-12-29 ENCOUNTER — APPOINTMENT (OUTPATIENT)
Dept: GENERAL RADIOLOGY | Age: 56
DRG: 053 | End: 2021-12-29
Attending: STUDENT IN AN ORGANIZED HEALTH CARE EDUCATION/TRAINING PROGRAM
Payer: MEDICAID

## 2021-12-29 LAB
ALBUMIN SERPL-MCNC: 2.9 G/DL (ref 3.5–5)
ALBUMIN/GLOB SERPL: 0.7 {RATIO} (ref 1.1–2.2)
ALP SERPL-CCNC: 58 U/L (ref 45–117)
ALT SERPL-CCNC: 14 U/L (ref 12–78)
ANION GAP SERPL CALC-SCNC: 17 MMOL/L (ref 5–15)
APTT PPP: 29.8 SEC (ref 22.1–31)
AST SERPL-CCNC: 18 U/L (ref 15–37)
BASOPHILS # BLD: 0 K/UL (ref 0–0.1)
BASOPHILS NFR BLD: 0 % (ref 0–1)
BILIRUB SERPL-MCNC: 0.8 MG/DL (ref 0.2–1)
BUN SERPL-MCNC: 62 MG/DL (ref 6–20)
BUN/CREAT SERPL: 10 (ref 12–20)
CALCIUM SERPL-MCNC: 9.1 MG/DL (ref 8.5–10.1)
CHLORIDE SERPL-SCNC: 100 MMOL/L (ref 97–108)
CHOLEST SERPL-MCNC: 162 MG/DL
CO2 SERPL-SCNC: 20 MMOL/L (ref 21–32)
CREAT SERPL-MCNC: 6.25 MG/DL (ref 0.7–1.3)
CRYPTOCOCCUS NEOFORMANS/GATTII, CRNEOG: NOT DETECTED
CYTOMEGALOVIRUS, CYMEG: NOT DETECTED
DIFFERENTIAL METHOD BLD: ABNORMAL
ENTEROVIRUS, ENTVIR: NOT DETECTED
EOSINOPHIL # BLD: 0 K/UL (ref 0–0.4)
EOSINOPHIL NFR BLD: 0 % (ref 0–7)
ERYTHROCYTE [DISTWIDTH] IN BLOOD BY AUTOMATED COUNT: 15.2 % (ref 11.5–14.5)
ESCHERICHIA COLI K1, ECK1: NOT DETECTED
EST. AVERAGE GLUCOSE BLD GHB EST-MCNC: 123 MG/DL
GLOBULIN SER CALC-MCNC: 4 G/DL (ref 2–4)
GLUCOSE BLD STRIP.AUTO-MCNC: 122 MG/DL (ref 65–117)
GLUCOSE BLD STRIP.AUTO-MCNC: 355 MG/DL (ref 65–117)
GLUCOSE BLD STRIP.AUTO-MCNC: 396 MG/DL (ref 65–117)
GLUCOSE BLD STRIP.AUTO-MCNC: 432 MG/DL (ref 65–117)
GLUCOSE BLD STRIP.AUTO-MCNC: 454 MG/DL (ref 65–117)
GLUCOSE BLD STRIP.AUTO-MCNC: 75 MG/DL (ref 65–117)
GLUCOSE SERPL-MCNC: 359 MG/DL (ref 65–100)
HAEMOPHILUS INFLUENZAE, HAEFLU: NOT DETECTED
HBA1C MFR BLD: 5.9 % (ref 4–5.6)
HCT VFR BLD AUTO: 31.7 % (ref 36.6–50.3)
HDLC SERPL-MCNC: 53 MG/DL
HDLC SERPL: 3.1 {RATIO} (ref 0–5)
HERPES SIMPLEX VIRUS 2, HSIMV2: NOT DETECTED
HGB BLD-MCNC: 10.1 G/DL (ref 12.1–17)
HSV1 DNA CSF QL NAA+PROBE: NEGATIVE
HSV1 DNA CSF QL NAA+PROBE: NOT DETECTED
HSV2 DNA CSF QL NAA+PROBE: NEGATIVE
HUMAN HERPESVIRUS 6, HUHV6: NOT DETECTED
HUMAN PARECHOVIRUS, HUPARV: NOT DETECTED
IMM GRANULOCYTES # BLD AUTO: 0.1 K/UL (ref 0–0.04)
IMM GRANULOCYTES NFR BLD AUTO: 1 % (ref 0–0.5)
INR PPP: 1.8 (ref 0.9–1.1)
LDLC SERPL CALC-MCNC: 80.8 MG/DL (ref 0–100)
LISTERIA MONOCYTOGENES, LISMON: NOT DETECTED
LYMPHOCYTES # BLD: 0.8 K/UL (ref 0.8–3.5)
LYMPHOCYTES NFR BLD: 8 % (ref 12–49)
MCH RBC QN AUTO: 30.3 PG (ref 26–34)
MCHC RBC AUTO-ENTMCNC: 31.9 G/DL (ref 30–36.5)
MCV RBC AUTO: 95.2 FL (ref 80–99)
MONOCYTES # BLD: 1 K/UL (ref 0–1)
MONOCYTES NFR BLD: 10 % (ref 5–13)
NEISSERIA MENINGITIDIS, NEIMEN: NOT DETECTED
NEUTS SEG # BLD: 8.8 K/UL (ref 1.8–8)
NEUTS SEG NFR BLD: 81 % (ref 32–75)
NRBC # BLD: 0 K/UL (ref 0–0.01)
NRBC BLD-RTO: 0 PER 100 WBC
PHOSPHATE SERPL-MCNC: 6.6 MG/DL (ref 2.6–4.7)
PLATELET # BLD AUTO: 196 K/UL (ref 150–400)
PMV BLD AUTO: 11.4 FL (ref 8.9–12.9)
POTASSIUM SERPL-SCNC: 4.1 MMOL/L (ref 3.5–5.1)
PROT SERPL-MCNC: 6.9 G/DL (ref 6.4–8.2)
PROTHROMBIN TIME: 18.6 SEC (ref 9–11.1)
RBC # BLD AUTO: 3.33 M/UL (ref 4.1–5.7)
SERVICE CMNT-IMP: ABNORMAL
SERVICE CMNT-IMP: NORMAL
SODIUM SERPL-SCNC: 137 MMOL/L (ref 136–145)
STREPTOCOCCUS AGALACTIAE, SAGA: NOT DETECTED
STREPTOCOCCUS PNEUMONIAE, STRPNE: NOT DETECTED
THERAPEUTIC RANGE,PTTT: NORMAL SECS (ref 58–77)
TRIGL SERPL-MCNC: 141 MG/DL (ref ?–150)
VARICELLA ZOSTER VIRUS, VAZOVI: NOT DETECTED
VLDLC SERPL CALC-MCNC: 28.2 MG/DL
WBC # BLD AUTO: 10.8 K/UL (ref 4.1–11.1)

## 2021-12-29 PROCEDURE — 36415 COLL VENOUS BLD VENIPUNCTURE: CPT

## 2021-12-29 PROCEDURE — 84100 ASSAY OF PHOSPHORUS: CPT

## 2021-12-29 PROCEDURE — 5A1D70Z PERFORMANCE OF URINARY FILTRATION, INTERMITTENT, LESS THAN 6 HOURS PER DAY: ICD-10-PCS | Performed by: INTERNAL MEDICINE

## 2021-12-29 PROCEDURE — 74011636637 HC RX REV CODE- 636/637: Performed by: STUDENT IN AN ORGANIZED HEALTH CARE EDUCATION/TRAINING PROGRAM

## 2021-12-29 PROCEDURE — 74011636637 HC RX REV CODE- 636/637: Performed by: HOSPITALIST

## 2021-12-29 PROCEDURE — 87040 BLOOD CULTURE FOR BACTERIA: CPT

## 2021-12-29 PROCEDURE — 74011250636 HC RX REV CODE- 250/636: Performed by: HOSPITALIST

## 2021-12-29 PROCEDURE — 80053 COMPREHEN METABOLIC PANEL: CPT

## 2021-12-29 PROCEDURE — 83036 HEMOGLOBIN GLYCOSYLATED A1C: CPT

## 2021-12-29 PROCEDURE — 99291 CRITICAL CARE FIRST HOUR: CPT | Performed by: PSYCHIATRY & NEUROLOGY

## 2021-12-29 PROCEDURE — 65660000000 HC RM CCU STEPDOWN

## 2021-12-29 PROCEDURE — 82962 GLUCOSE BLOOD TEST: CPT

## 2021-12-29 PROCEDURE — 74011000258 HC RX REV CODE- 258: Performed by: HOSPITALIST

## 2021-12-29 PROCEDURE — 85610 PROTHROMBIN TIME: CPT

## 2021-12-29 PROCEDURE — 90935 HEMODIALYSIS ONE EVALUATION: CPT

## 2021-12-29 PROCEDURE — 85730 THROMBOPLASTIN TIME PARTIAL: CPT

## 2021-12-29 PROCEDURE — 85025 COMPLETE CBC W/AUTO DIFF WBC: CPT

## 2021-12-29 PROCEDURE — 95816 EEG AWAKE AND DROWSY: CPT | Performed by: PSYCHIATRY & NEUROLOGY

## 2021-12-29 PROCEDURE — C9254 INJECTION, LACOSAMIDE: HCPCS | Performed by: HOSPITALIST

## 2021-12-29 PROCEDURE — 80061 LIPID PANEL: CPT

## 2021-12-29 PROCEDURE — 71045 X-RAY EXAM CHEST 1 VIEW: CPT

## 2021-12-29 RX ORDER — INSULIN GLARGINE 100 [IU]/ML
15 INJECTION, SOLUTION SUBCUTANEOUS DAILY
Status: DISCONTINUED | OUTPATIENT
Start: 2021-12-29 | End: 2022-01-09

## 2021-12-29 RX ADMIN — THIAMINE HYDROCHLORIDE 250 MG: 100 INJECTION, SOLUTION INTRAMUSCULAR; INTRAVENOUS at 08:52

## 2021-12-29 RX ADMIN — SODIUM CHLORIDE 100 MG: 9 INJECTION, SOLUTION INTRAVENOUS at 13:16

## 2021-12-29 RX ADMIN — Medication 15 UNITS: at 09:20

## 2021-12-29 RX ADMIN — Medication 8 UNITS: at 10:13

## 2021-12-29 RX ADMIN — DEXTROSE AND SODIUM CHLORIDE 50 ML/HR: 5; 900 INJECTION, SOLUTION INTRAVENOUS at 03:21

## 2021-12-29 RX ADMIN — LEVETIRACETAM 500 MG: 100 INJECTION, SOLUTION INTRAVENOUS at 00:28

## 2021-12-29 RX ADMIN — LEVETIRACETAM 500 MG: 100 INJECTION, SOLUTION INTRAVENOUS at 12:37

## 2021-12-29 RX ADMIN — ACYCLOVIR SODIUM 280 MG: 50 INJECTION, SOLUTION INTRAVENOUS at 01:44

## 2021-12-29 RX ADMIN — SODIUM CHLORIDE 100 MG: 9 INJECTION, SOLUTION INTRAVENOUS at 01:02

## 2021-12-29 RX ADMIN — Medication 10 UNITS: at 12:37

## 2021-12-29 RX ADMIN — LEVOFLOXACIN 500 MG: 5 INJECTION, SOLUTION INTRAVENOUS at 23:00

## 2021-12-29 NOTE — PROGRESS NOTES
Nephrology Progress Note  Dorian Antonio     [facility]. 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 He looks volume down so plan for minimal ultrafiltration.  Hemoglobin stable no need for EPO   Continue with blood pressure medications   Ongoing work-up for metabolic encephalopathy and seizures per neurology.  Thank you for the consult, will follow with you     Subjective: Interval History:   Patient was seen and examined he is currently noncommunicative so most of history was obtained from the medical records. He was transferred from an outside facility to [facility] for persistent altered mental status and unresponsiveness of unclear etiology and the need for continuous EEG monitoring and EEG based titration of medications. On further review of the case of seem that he has been diagnosed with EUSEBIA secondary to interstitial nephritis due to NSAID induced. This was biopsy-proven in [facility]. He was seen by [facility] nephrology group at that hospital and was given prednisone for 7 days without any improvement. He later was started on dialysis. And has been on Monday Wednesday Friday schedule. He has a right chest permacath. Patient was transferred here  for higher level of care. Renal consult requested for evaluation of hemodialysis for EUSEBIA.     Objective:   Vitals:    12/28/21 2332 12/29/21 0418 12/29/21 0749   BP: (!) 174/93 (!) 159/87 (!) 174/92   Pulse: 71 81 78   Resp: 18 18 16   Temp: (!) 100.8 °F (38.2 °C) 98.1 °F (36.7 °C) 98.8 °F (37.1 °C)   SpO2: 93% 94% 93%      12/28 0701 - 12/29 0700  In: -   Out: 750 [Urine:750]  There were no vitals filed for this visit. Physical exam:   GEN: Altered mental status, cachectic  NECK- Supple, no mass  RESP: No wheezing, Clear b/l  CVS: S1,S2  RRR  NEURO: Altered mental status  EXT: No Edema       Chart reviewed. Pertinent Notes reviewed. Data Review :  Recent Labs     12/29/21  0140 12/28/21  0403 12/27/21  0544    136 135*   K 4.1 3.8 3.8    100 100   CO2 20* 26 23   BUN 62* 27* 56*   CREA 6.25* 3.84* 6.78*   * 199* 165*   CA 9.1 8.5 8.6   PHOS 6.6*  --   --      Recent Labs     12/29/21 0140 12/28/21  0403 12/27/21  0544   WBC 10.8 11.9 14.3*   HGB 10.1* 10.5* 9.8*   HCT 31.7* 33.3* 29.6*    174 152     No results for input(s): FE, TIBC, PSAT, FERR in the last 72 hours.    Medication list  reviewed  Current Facility-Administered Medications   Medication Dose Route Frequency    insulin glargine (LANTUS) injection 15 Units  15 Units SubCUTAneous DAILY    acyclovir (ZOVIRAX) 280 mg in 0.9% sodium chloride 100 mL IVPB  280 mg IntraVENous Q24H    lacosamide (VIMPAT) 100 mg in 0.9% sodium chloride 100 mL IVPB  100 mg IntraVENous Q12H    [START ON 12/30/2021] levoFLOXacin (LEVAQUIN) 500 mg in D5W IVPB  500 mg IntraVENous Q48H    thiamine (B-1) 250 mg in 0.9% sodium chloride 50 mL IVPB  250 mg IntraVENous DAILY    hydrALAZINE (APRESOLINE) 20 mg/mL injection 10 mg  10 mg IntraVENous Q6H PRN    ondansetron (ZOFRAN ODT) tablet 4 mg  4 mg Oral Q8H PRN    Or    ondansetron (ZOFRAN) injection 4 mg  4 mg IntraVENous Q6H PRN    acetaminophen (TYLENOL) tablet 650 mg  650 mg Oral Q4H PRN    Or    acetaminophen (TYLENOL) solution 650 mg  650 mg Per NG tube Q4H PRN    Or    acetaminophen (TYLENOL) suppository 650 mg  650 mg Rectal Q4H PRN    levETIRAcetam (KEPPRA) 500 mg in 0.9% sodium chloride 100 mL IVPB  500 mg IntraVENous Q12H    insulin lispro (HUMALOG) injection   SubCUTAneous AC&HS    glucose chewable tablet 16 g  4 Tablet Oral PRN    dextrose (D50W) injection syrg 12.5-25 g  12.5-25 g IntraVENous PRN    glucagon (GLUCAGEN) injection 1 mg  1 mg IntraMUSCular PRN          Chantelle Sensing, [id number] [facility]  [facility], [address]  [place], [address]  Phone - [phone]               Fax - [phone]

## 2021-12-29 NOTE — PROGRESS NOTES
Occupational Therapy    Order's acknowledged, chart reviewed and noted that pt was discharged from therapy at prior hospital secondary to not being medically appropriate, with OT speaking with nursing who reports pt continues to be AxO to zero, with no command following or verbalizing, inconsistently nodding head yes or no. Given pt's current continued presentation, OT to sign off as pt not medically appropriate for therapy at this time. Please re-consult if medical status changes.     Thank you,  Zackery Raymundo, OT

## 2021-12-29 NOTE — DISCHARGE SUMMARY
[facility]  [facility]charge Summary       Patient: Blayne Dougherty Age: 49 y.o. : 1965 MR#: [id number] [id number]: [id number]  PCP on record: Sarah Bustamante MD  Admit date: 2021  Discharge date: 2021    Consults:  Dr Helen Cottrell and JENNIFER Tran,-neurology  - ANDER Voss,-ID  -ANDER Croft,-nephrology  Procedures:  -   Lumbar puncture on     Significant Diagnostic Studies:   -EEG on   - MRI on :  IMPRESSION     No acute intracranial findings.     Minimal periventricular white matter signal abnormality, not clearly pathologic  for age, possibly chronic microvascular ischemic change.     Wet read provided by Dr. Oscar Sweeney at 2119 hours, 2021     -CT chest abd pelv on 21:  IMPRESSION             1.  Small left pleural effusion.     2.  Tiny right apical nodule. Recommend follow-up CT in 12 months if the  patient is considered to be at high risk for lung CA.     3. No acute abnormalities along the GI tract.     4.  Tiny intrarenal stones in both kidneys. No obstructive changes. -CT head 21:  IMPRESSION                1.  No acute intracranial process.     2. Chronic small vessel ischemic changes.   Discharge Diagnoses:   -    Acute metabolic encepahlopathy   -Hypoglycemia  -Interstitial nephritis  -Leukocytosis  -Thrombocytopenia                                Patient Active Problem List   Diagnosis Code    Hyperosmolar non-ketotic state in patient with type 2 diabetes mellitus ([address] E11.00    Hyperglycemia due to type 2 diabetes mellitus ([address] E11.65    Hypoglycemia E16.2    EU[place] (acute kidney injury) ([address] E36.3    Metabolic acidemia O23.8    Intractable nausea and vomiting R11.2    Acute metabolic encephalopathy E31.48    ATN (acute tubular necrosis) (HCC) N17.0    Interstitial nephritis determined by biopsy N12    Thrombocytopenia ([address] D69.6    Hypertension I10    Sepsis ([address] A41.9       Hospital Course by Problem 49year old male w/ h/o ESRD on HD, DM, seizure d/o recently admitted at an outside facility for management of aspiration pna, admitted on 12/24 after he presented with reports of confusion and hypoglycemia.        -Acute metabolic encephalopathy cause unclear. Pt remains unresponsive. No further episodes of hypoglycemia noted. Concern for status epilepticus especially given seizure like activities witnessed by this provider on 12/27 before keppra loading dose and increase in bid keppra dosing. On date of transfer, pt remains unresponsive other than opening eyes slightly with verbal and tactile stimuli, resting in the same position he has been since yesterday. Per security person in room he has had some right upper ext non purposeful movement.     -->S/p LP by neurologist on 12/25, CSF analysis not c/w bacterial meningitis. CSF Cx NGTD. Was on Cefepime dc'd on 12/27 remains on levofloxacin and vanco. Also on, acyclovir which was started on 12/26. Overall infectious etiology is thought to be less likely. ID consultant ordered meningitis pathogen panel test on CSF which is pending at this time, HSV CSF PCR is still pending. Recommendation is to cont acyclovir until HSV PCR result comes back. Consider stopping other abx if the menigitis pathogen panel is negative.     -->MRI brain on 12/26--> no acute intracranial findings.        -->EEG done 12/26-->  Per neurologist read as: These findings indicate two different processes: 1. A diffuse encephalopathic process which could could be from toxic, metabolic or parainfectious processes. 2. Focal cortical hyperexcitability in the right hemisphere and associated electrographic seizures.       On 12/27,Discussed w/ neurology with recommendations made to give loading dose of keppra as it was not given prior and to increase keppra to 1000 mg bid and to transfer patient to a facility with continuous EEG monitoring.  Pt has been accepted by neurologist and hospitalist service at [facility] in [place] and is to be transferred tonight.     -->On Vimpat and Keppra   --> On high dose thiamine to cover possibility of Wernicke's encepahlopathy, please cont 250 mg of IV thiamine daily for 5 days then de escalate per Wernicke's encephalopathy treatment protocol.          FEN: has been started on D5 given that he has not woken up enough for SLP eval. Electrolytes wnl today. Has not had PO intake for several days. Unable to place NGT for enteral feeding due to his status epilepticus state.      -Hypoglycemia: resolved     -ESRD thought to be due to Interstitial nephritis due to NSAIDS. Nephrologist has been managing w/ HD. Plans for HD by nephrology tomorrow.        -Thrombocytopenia: resolved.        HISTORY OF:  -HTN not on meds. Bp's now elevated consider clonidine patch until able to take oral meds  -DM1  -Dyslipidemia  -Polysubstance abuse          Today's examination of the patient revealed:     Subjective:     Objective:   VS:   Visit Vitals  BP (!) 180/95 (BP 1 Location: Right upper arm, BP Patient Position: At rest)   Pulse 70   Temp 99.1 °F (37.3 °C)   Resp 18   Wt 52.6 kg (116 lb)   SpO2 94%   BMI 21.22 kg/m²      Tmax/24hrs: Temp (24hrs), Av.2 °F (36.8 °C), Min:97.2 °F (36.2 °C), Max:99.1 °F (37.3 °C)     Input/Output:     Intake/Output Summary (Last 24 hours) at 2021  Last data filed at 2021 1011  Gross per 24 hour   Intake 0 ml   Output 3000 ml   Net -3000 ml     Opens eyes briefly with legs being passively moved. HEENT: NCAT. COR: RRR, no murmurs  PULM: CTAB  ABD: soft, nt, nd  Extremities no edema. Contracted  Neuro: opened eyes briefly when legs were moved, did not make eye contact, no spontaneous movement of extremities/face noted, neck is stiff. Labs:    Recent Results (from the past 24 hour(s))   GLUCOSE, POC    Collection Time: 21 12:20 AM   Result Value Ref Range    Glucose (POC) 198 (H) 70 - 481 mg/dL   METABOLIC PANEL, BASIC Collection Time: 12/28/21  4:03 AM   Result Value Ref Range    Sodium 136 136 - 145 mmol/L    Potassium 3.8 3.5 - 5.5 mmol/L    Chloride 100 100 - 111 mmol/L    CO2 26 21 - 32 mmol/L    Anion gap 10 3.0 - 18 mmol/L    Glucose 199 (H) 74 - 99 mg/dL    BUN 27 (H) 7.0 - 18 MG/DL    Creatinine 3.84 (H) 0.6 - 1.3 MG/DL    BUN/Creatinine ratio 7 (L) 12 - 20      GFR est AA 20 (L) >60 ml/min/1.73m2    GFR est non-AA 16 (L) >60 ml/min/1.73m2    Calcium 8.5 8.5 - 10.1 MG/DL   CBC WITH AUTOMATED DIFF    Collection Time: 12/28/21  4:03 AM   Result Value Ref Range    WBC 11.9 4.6 - 13.2 K/uL    RBC 3.48 (L) 4.35 - 5.65 M/uL    HGB 10.5 (L) 13.0 - 16.0 g/dL    HCT 33.3 (L) 36.0 - 48.0 %    MCV 95.7 78.0 - 100.0 FL    MCH 30.2 24.0 - 34.0 PG    MCHC 31.5 31.0 - 37.0 g/dL    RDW 15.2 (H) 11.6 - 14.5 %    PLATELET 628 007 - 349 K/uL    MPV 11.5 9.2 - 11.8 FL    NRBC 0.0 0  WBC    ABSOLUTE NRBC 0.00 0.00 - 0.01 K/uL    NEUTROPHILS 86 (H) 40 - 73 %    LYMPHOCYTES 5 (L) 21 - 52 %    MONOCYTES 8 3 - 10 %    EOSINOPHILS 0 0 - 5 %    BASOPHILS 0 0 - 2 %    IMMATURE GRANULOCYTES 1 (H) 0.0 - 0.5 %    ABS. NEUTROPHILS 10.2 (H) 1.8 - 8.0 K/UL    ABS. LYMPHOCYTES 0.6 (L) 0.9 - 3.6 K/UL    ABS. MONOCYTES 1.0 0.05 - 1.2 K/UL    ABS. EOSINOPHILS 0.0 0.0 - 0.4 K/UL    ABS. BASOPHILS 0.0 0.0 - 0.1 K/UL    ABS. IMM.  GRANS. 0.1 (H) 0.00 - 0.04 K/UL    DF AUTOMATED     GLUCOSE, POC    Collection Time: 12/28/21  6:21 AM   Result Value Ref Range    Glucose (POC) 267 (H) 70 - 110 mg/dL   GLUCOSE, POC    Collection Time: 12/28/21 11:24 AM   Result Value Ref Range    Glucose (POC) 175 (H) 70 - 110 mg/dL     Additional Data Reviewed:     Condition: guarded  Disposition:    []Home   []Home with Home Health   []SNF/NH   []Rehab   []Home with family   []Alternate Facility:_[facility]___________________      Discharge Medications:     Current Discharge Medication List      START taking these medications    Details   acyclovir 273 mg 273 mg by IntraVENous route every twenty-four (24) hours. Qty: 29978 Dose, Refills: 0      lacosamide (VIMPAT) IVPB 100 mg by IntraVENous route every twelve (12) hours every twelve (12) hours. Max Daily Amount: 200 mg. Qty: 1 Each, Refills: 0    Associated Diagnoses: Altered mental status, unspecified altered mental status type      levETIRAcetam in saline, iso-os, (KEPPRA) 1,000 mg/100 mL pgbk IVPB premix 100 mL by IntraVENous route every twelve (12) hours every twelve (12) hours. Qty: 60008 mL, Refills: 0      levoFLOXacin (LEVAQUIN) 500 mg/100 mL 100 mL by IntraVENous route every fourty-eight (48) hours. Qty: 3333 mL, Refills: 0      thiamine IVPB 250 mg by IntraVENous route daily for 4 doses. Qty: 1 Each, Refills: 0         CONTINUE these medications which have CHANGED    Details   insulin glargine (LANTUS) 100 unit/mL injection 3 Units by SubCUTAneous route daily.   Qty: 1 mL, Refills: 0         STOP taking these medications       atorvastatin (LIPITOR) 10 mg tablet Comments:   Reason for Stopping:         levETIRAcetam (KEPPRA) 500 mg tablet Comments:   Reason for Stopping:         amLODIPine (NORVASC) 5 mg tablet Comments:   Reason for Stopping:         b complex-vitamin c-folic acid (NEPHROCAPS) 1 mg capsule Comments:   Reason for Stopping:         cyanocobalamin (VITAMIN B12) 2,500 mcg sublingual tablet Comments:   Reason for Stopping:         epoetin jeff-epbx (RETACRIT) 10,000 unit/mL injection Comments:   Reason for Stopping:         ferrous sulfate 325 mg (65 mg iron) tablet Comments:   Reason for Stopping:         folic acid (FOLVITE) 1 mg tablet Comments:   Reason for Stopping:         insulin lispro (HUMALOG) 100 unit/mL injection Comments:   Reason for Stopping:         sevelamer carbonate (RENVELA) 800 mg tab tab Comments:   Reason for Stopping:         thiamine HCL (B-1) 100 mg tablet Comments:   Reason for Stopping:         senna-docusate (PERICOLACE) 8.6-50 mg per tablet Comments:   Reason for Stopping:         pantoprazole (PROTONIX) 40 mg tablet Comments:   Reason for Stopping:         bumetanide (BUMEX) 2 mg tablet Comments:   Reason for Stopping:         cloNIDine (CATAPRES) 0.3 mg/24 hr Comments:   Reason for Stopping: Follow-up Appointments:   1. Your PCP: Connie Marsh MD, within 7-10days  2. Please follow-up on tests/labs that are still pendin.  Meningitis panel, hsv csf pcr    >30 minutes spent coordinating this discharge (review instructions/follow-up, prescriptions, preparing report for sign off)    Signed:  Angélica Ozuan MD  2021  8:24 PM

## 2021-12-29 NOTE — CONSULTS
IP CONSULT TO NEUROLOGY  Consult performed by: Nestor Doll MD  Consult ordered by: Genesis Longo MD        Chief Complaint: altered mental status    Patient is noncommunicative and most of the history is obtained from the chart. This is a 82-year-old male with a medical history of end-stage renal disease on presumed seizure disorder. The patient was transferred from an outside hospital after becoming unresponsive. There are no clear seizures noted. Dr. Rui Hernandez, the transferring physician, was concerned that he was in non convulsive status. She shared with me two  EEGs one which showed right frontotemporal seizure on a background of generalizes slowing. Transfer was accepted for better level of care. Available contact information phone number does not work. Assesment and Plan    1. Altered mental status  Transferred for continuous EEG monitoring and over all better management  No history of seizures   Out side hospital EEG indication triphasic slowing and focal 3hz right fronto temproal field siezure activety. 2. Acute renal failure  Scheduled for dialysis           Allergies  Patient has no known allergies.      Medications  Current Facility-Administered Medications   Medication Dose Route Frequency    insulin glargine (LANTUS) injection 15 Units  15 Units SubCUTAneous DAILY    acyclovir (ZOVIRAX) 280 mg in 0.9% sodium chloride 100 mL IVPB  280 mg IntraVENous Q24H    lacosamide (VIMPAT) 100 mg in 0.9% sodium chloride 100 mL IVPB  100 mg IntraVENous Q12H    [START ON 12/30/2021] levoFLOXacin (LEVAQUIN) 500 mg in D5W IVPB  500 mg IntraVENous Q48H    thiamine (B-1) 250 mg in 0.9% sodium chloride 50 mL IVPB  250 mg IntraVENous DAILY    hydrALAZINE (APRESOLINE) 20 mg/mL injection 10 mg  10 mg IntraVENous Q6H PRN    ondansetron (ZOFRAN ODT) tablet 4 mg  4 mg Oral Q8H PRN    Or    ondansetron (ZOFRAN) injection 4 mg  4 mg IntraVENous Q6H PRN    acetaminophen (TYLENOL) tablet 650 mg  650 mg Oral Q4H PRN    Or    acetaminophen (TYLENOL) solution 650 mg  650 mg Per NG tube Q4H PRN    Or    acetaminophen (TYLENOL) suppository 650 mg  650 mg Rectal Q4H PRN    levETIRAcetam (KEPPRA) 500 mg in 0.9% sodium chloride 100 mL IVPB  500 mg IntraVENous Q12H    insulin lispro (HUMALOG) injection   SubCUTAneous AC&HS    glucose chewable tablet 16 g  4 Tablet Oral PRN    dextrose (D50W) injection syrg 12.5-25 g  12.5-25 g IntraVENous PRN    glucagon (GLUCAGEN) injection 1 mg  1 mg IntraMUSCular PRN        Medical History  Past Medical History:   Diagnosis Date    Asthma     Diabetes ([facility]     High cholesterol     Seizure ([facility]      ROS  conused      Exam:    Visit Vitals  BP (!) 174/92 (BP 1 Location: Right upper arm, BP Patient Position: At rest)   Pulse 78   Temp 98.8 °F (37.1 °C)   Resp 16   SpO2 93%        General: Lethargic   Head: Normocephalic, atraumatic, anicteric sclera   Neck Normal ROM,  no thyromegally   Lungs:  Clear to auscultation bilaterally, No wheezes or rubs   Cardiac: Regular rate and rhythm with no murmurs. Abd: Bowel sounds were audible. Ext: No pedal edema   Skin: Supple no rash     NeurologicExam:  Mental Status: lethargic   Speech: Mumbles nonsensically   Cranial Nerves:  opens eyes spontaneously  responds to sound  EOMI  PERRL  corneal reflex intact  symmetric grimace  Present gag reflex    Motor:  Moves extremities symmetrically . Normal bulk and tone. Reflexes:   Deep tendon reflexes 2+/4 and symmetric. Sensory:   Perceives tactile stimulation   Tremor:   No tremor noted. Cerebellar:  Coordination intact. Neurovascular: No carotid bruits.  No JVD         Imaging    CT Results (most recent):  Results from Hospital Encounter encounter on 12/24/21    CT CHEST ABD PELV WO CONT    Narrative  EXAM:  CT Chest, Abdomen, Pelvis with Contrast.    CLINICAL INDICATION:    - Abdominal pain with shortness of breath  - Low blood glucose, nausea, vomiting.  - AMS with confusion.  - Worsening symptoms after dialysis. COMPARISON:  None. TECHNIQUE:  Helically scanned volumetric axial sections of the chest, abdomen  and pelvis are obtained without IV or oral contrast administration. Coronal and  sagittal reconstruction images are generated to improve anatomic delineation. Radiation dose optimization techniques are utilized as appropriate to the exam,  with combination of automated exposure control, adjustment of the mA and/or kV  according to patient's size (Including appropriate matching for site-specific  examinations), or use of iterative reconstruction technique. FINDINGS:    CT Chest    Lungs, Pleura:    - 3 mm right apical nodule (axial #17)  - No focal infiltrate or consolidation is detected. - Small left pleural effusion. No significant pleural effusion on the right  side. Mediastinum:  No adenopathy. Shotty mediastinal nodes. Base of Neck:  No acute abnormalities. Status post right IJ approach dialysis  catheter placement. Chest Wall, Axillae:  No evidence of significant axillary adenopathy. Esophagus:  Unremarkable. CT Abdomen-Pelvis    Liver, Gallbladder, Adrenal Glands, Spleen, Pancreas:  Unremarkable. Kidneys-Ureters-Bladder:  Punctate 2 mm intrarenal stone and right kidney near  the lower pole (axial #43). Additional intrarenal punctate stones in the left  kidney near the upper pole (axial #27, 28). No hydronephrosis bilaterally. Unremarkable urinary bladder. Unremarkable prostate. GI Tract:  Unremarkable stomach and small bowel. The appendix is normal in  caliber. No acute colitis or acute diverticulitis is appreciated. Nodes:  No mesenteric or retroperitoneal lymphadenopathy. Vascular:  No acute abnormalities. Bones:  No acute abnormalities. Impression  1. Small left pleural effusion. 2.  Tiny right apical nodule. Recommend follow-up CT in 12 months if the  patient is considered to be at high risk for lung CA.     3.  No acute abnormalities along the GI tract. 4.  Tiny intrarenal stones in both kidneys. No obstructive changes. MRI Results (most recent):  Results from [facility] encounter on 12/24/21    MRI BRAIN WO CONT    Narrative  EXAMINATION: MRI brain without contrast    INDICATION: Hypertension, seizure disorder    COMPARISON: CT 12/24/2021    TECHNIQUE: Multiplanar multiecho MRI sequences of the brain performed without  contrast.    FINDINGS:    No evidence of restricted diffusion to suggest acute infarct. No abnormal SWI  signal to suggest acute hemorrhage. No focal mass effect or shift of midline  structures. Mild cerebral cortical atrophy. Sella and pineal regions  unremarkable. No abnormal extra-axial collection. Ventricles are normal in size  and configuration. Major intracranial flow voids preserved. Minimal amount of  periventricular white matter signal abnormality. Imaged paranasal sinuses and mastoids appear well-aerated. No suspicious  calvarial lesions. Superficial soft tissues unremarkable. Impression  No acute intracranial findings. Minimal periventricular white matter signal abnormality, not clearly pathologic  for age, possibly chronic microvascular ischemic change. Wet read provided by Dr. Cathie Freitas at 2119 hours, 12/26/2021.       .  Lab Review    Recent Results (from the past 24 hour(s))   METABOLIC PANEL, COMPREHENSIVE    Collection Time: 12/29/21  1:40 AM   Result Value Ref Range    Sodium 137 136 - 145 mmol/L    Potassium 4.1 3.5 - 5.1 mmol/L    Chloride 100 97 - 108 mmol/L    CO2 20 (L) 21 - 32 mmol/L    Anion gap 17 (H) 5 - 15 mmol/L    Glucose 359 (H) 65 - 100 mg/dL    BUN 62 (H) 6 - 20 MG/DL    Creatinine 6.25 (H) 0.70 - 1.30 MG/DL    BUN/Creatinine ratio 10 (L) 12 - 20      GFR est AA 11 (L) >60 ml/min/1.73m2    GFR est non-AA 9 (L) >60 ml/min/1.73m2    Calcium 9.1 8.5 - 10.1 MG/DL    Bilirubin, total 0.8 0.2 - 1.0 MG/DL    ALT (SGPT) 14 12 - 78 U/L    AST (SGOT) 18 15 - 37 U/L Alk. phosphatase 58 45 - 117 U/L    Protein, total 6.9 6.4 - 8.2 g/dL    Albumin 2.9 (L) 3.5 - 5.0 g/dL    Globulin 4.0 2.0 - 4.0 g/dL    A-G Ratio 0.7 (L) 1.1 - 2.2     CBC WITH AUTOMATED DIFF    Collection Time: 12/29/21  1:40 AM   Result Value Ref Range    WBC 10.8 4.1 - 11.1 K/uL    RBC 3.33 (L) 4.10 - 5.70 M/uL    HGB 10.1 (L) 12.1 - 17.0 g/dL    HCT 31.7 (L) 36.6 - 50.3 %    MCV 95.2 80.0 - 99.0 FL    MCH 30.3 26.0 - 34.0 PG    MCHC 31.9 30.0 - 36.5 g/dL    RDW 15.2 (H) 11.5 - 14.5 %    PLATELET 076 432 - 215 K/uL    MPV 11.4 8.9 - 12.9 FL    NRBC 0.0 0  WBC    ABSOLUTE NRBC 0.00 0.00 - 0.01 K/uL    NEUTROPHILS 81 (H) 32 - 75 %    LYMPHOCYTES 8 (L) 12 - 49 %    MONOCYTES 10 5 - 13 %    EOSINOPHILS 0 0 - 7 %    BASOPHILS 0 0 - 1 %    IMMATURE GRANULOCYTES 1 (H) 0.0 - 0.5 %    ABS. NEUTROPHILS 8.8 (H) 1.8 - 8.0 K/UL    ABS. LYMPHOCYTES 0.8 0.8 - 3.5 K/UL    ABS. MONOCYTES 1.0 0.0 - 1.0 K/UL    ABS. EOSINOPHILS 0.0 0.0 - 0.4 K/UL    ABS. BASOPHILS 0.0 0.0 - 0.1 K/UL    ABS. IMM. GRANS. 0.1 (H) 0.00 - 0.04 K/UL    DF AUTOMATED     PROTHROMBIN TIME + INR    Collection Time: 12/29/21  1:40 AM   Result Value Ref Range    INR 1.8 (H) 0.9 - 1.1      Prothrombin time 18.6 (H) 9.0 - 11.1 sec   PTT    Collection Time: 12/29/21  1:40 AM   Result Value Ref Range    aPTT 29.8 22.1 - 31.0 sec    aPTT, therapeutic range     58.0 - 77.0 SECS   PHOSPHORUS    Collection Time: 12/29/21  1:40 AM   Result Value Ref Range    Phosphorus 6.6 (H) 2.6 - 4.7 MG/DL   GLUCOSE, POC    Collection Time: 12/29/21  8:34 AM   Result Value Ref Range    Glucose (POC) 454 (H) 65 - 117 mg/dL    Performed by Lázaro PENALOZA    GLUCOSE, POC    Collection Time: 12/29/21  8:39 AM   Result Value Ref Range    Glucose (POC) 432 (H) 65 - 117 mg/dL    Performed by Lázaro PENALOZA        Patient is in critical condition and is at risk for sudden deterioration.  30 minutes spent on floor examining patient and reviewing chart

## 2021-12-29 NOTE — PROGRESS NOTES
Hospitalist Progress Note    NAME: Deisy Nicholson   :  1965   MRN:  [id number]       Assessment / Plan:  Acute metabolic encephalopathy  Status epilepticus  History of seizure disorder  Aspiration pneumonia  MRI brainno acute abnormality. CT headno acute abnormality. Lumbar puncture doneCSF reveals no infectious etiology so far. CSF culture negative. CSF HSV pending, when the results are back then will decide about the acyclovir. CSF meningitis panel negative. Out side hospital EEG indication triphasic slowing and focal 3hz right fronto temproal field siezure activety. On continuous EEG. Neurology on boardappreciate input. Continue Keppra along with Vimpat. Seizure precaution. Chest x-rayno acute pathology. - Discontinue Levaquin as the patient still having a fever. [address] along with procalcitonin in a.m. Initial blood culture from the other facility was negative, will repeat blood culture again today. End-stage renal disease on dialysis  Nephrology was consultedhemodialysis per nephrology. Hypertension  Diabetes type 2  Hyperlipidemia  Polysubstance abuse  Sliding scale insulin along with Lantus. There is no height or weight on file to calculate BMI. Estimated discharge date:   Barriers:    Code status: Full  Prophylaxis: Lovenox  Recommended Disposition:      Subjective:     Chief Complaint / Reason for Physician Visit  Patient seen today, awake but confused. Patient also has a fever of 101. Discussed with RN events overnight.      Review of Systems:  Symptom Y/N Comments  Symptom Y/N Comments   Fever/Chills    Chest Pain     Poor Appetite    Edema     Cough    Abdominal Pain     Sputum    Joint Pain     SOB/LIM    Pruritis/Rash     Nausea/vomit    Tolerating PT/OT     Diarrhea    Tolerating Diet     Constipation    Other       Could NOT obtain due to:  Patient condition     Objective:     VITALS:   Last 24hrs VS reviewed since prior progress note. Most recent are:  Patient Vitals for the past 24 hrs:   Temp Pulse Resp BP SpO2   12/29/21 1415  77  138/87    12/29/21 1400  83  (!) 130/90    12/29/21 1345  85  (!) 135/90    12/29/21 1330  81  (!) 142/88    12/29/21 1315  84  (!) 141/89    12/29/21 1300  76  (!) 148/90    12/29/21 1245  79  (!) 149/89    12/29/21 1230 100.4 °F (38 °C) 82 16 (!) 153/89    12/29/21 1140 (!) 101.5 °F (38.6 °C) 80 16 (!) 163/88 93 %   12/29/21 0749 98.8 °F (37.1 °C) 78 16 (!) 174/92 93 %   12/29/21 0418 98.1 °F (36.7 °C) 81 18 (!) 159/87 94 %   12/28/21 2332 (!) 100.8 °F (38.2 °C) 71 18 (!) 174/93 93 %       Intake/Output Summary (Last 24 hours) at 12/29/2021 1439  Last data filed at 12/29/2021 0645  Gross per 24 hour   Intake    Output 750 ml   Net -750 ml        I had a face to face encounter and independently examined this patient on 12/29/2021, as outlined below:  PHYSICAL EXAM:  General: WD, WN. Awake but not alert, no acute distress    EENT:  EOMI. Anicteric sclerae. MMM  Resp:  CTA bilaterally, no wheezing or rales. No accessory muscle use  CV:  Regular  rhythm,  No edema  GI:  Soft, Non distended, Non tender. +Bowel sounds  Neurologic:  Not alert and oriented X 3, normal speech,   Psych:   Good insight. Not anxious nor agitated  Skin:  No rashes. No jaundice    Reviewed most current lab test results and cultures  YES  Reviewed most current radiology test results   YES  Review and summation of old records today    NO  Reviewed patient's current orders and MAR    YES  PMH/SH reviewed - no change compared to H&P  ________________________________________________________________________  Care Plan discussed with:    Comments   Patient     Family      RN x    Care Manager     Consultant                       x Multidiciplinary team rounds were held today with , nursing, pharmacist and clinical coordinator.   Patient's plan of care was discussed; medications were reviewed and discharge planning was addressed. ________________________________________________________________________  Total NON critical care TIME:  38  Minutes    Total CRITICAL CARE TIME Spent:   Minutes non procedure based      Comments   >50% of visit spent in counseling and coordination of care     ________________________________________________________________________  Miguel Angel Rodriguez MD     Procedures: see electronic medical records for all procedures/Xrays and details which were not copied into this note but were reviewed prior to creation of Plan. LABS:  I reviewed today's most current labs and imaging studies.   Pertinent labs include:  Recent Labs     12/29/21  0140 12/28/21  0403 12/27/21  0544   WBC 10.8 11.9 14.3*   HGB 10.1* 10.5* 9.8*   HCT 31.7* 33.3* 29.6*    174 152     Recent Labs     12/29/21 0140 12/28/21  0403 12/27/21  0544    136 135*   K 4.1 3.8 3.8    100 100   CO2 20* 26 23   * 199* 165*   BUN 62* 27* 56*   CREA 6.25* 3.84* 6.78*   CA 9.1 8.5 8.6   PHOS 6.6*  --   --    ALB 2.9*  --   --    TBILI 0.8  --   --    ALT 14  --   --    INR 1.8*  --   --        Signed: Miguel Angel Rodriguez MD

## 2021-12-29 NOTE — PROCEDURES
Hemodialysis / [phone]    Vitals Pre Post Assessment Pre Post   BP BP: (!) 141/89 (nurse in room) (12/29/21 1315) 151/89 LOC AMS AMS   HR Pulse (Heart Rate): 84 (12/29/21 1315) 69 Lungs CTA CTA   Resp Resp Rate: 16 (12/29/21 1230) 16 Cardiac s1s2 s1s2   Temp Temp: 100.4 °F (38 °C) (12/29/21 1230) 98 Skin Intact catheter Intact catheter   Weight    Edema None None   Tele status Yes Yes Pain Pain Intensity 1: 0 (12/28/21 2337) 0     Orders   Duration: Start: 3582 End: 2016 Total: 3.5 hours   Dialyzer: Dialyzer/Set Up Inspection: Revaclear (12/29/21 1230)   K Bath: Dialysate K (mEq/L): 3 (12/29/21 1230)   Ca Bath: Dialysate CA (mEq/L): 2.5 (12/29/21 1230)   Na: Dialysate NA (mEq/L): 140 (12/29/21 1230)   Bicarb: Dialysate HCO3 (mEq/L): 35 (12/29/21 1230)   Target Fluid Removal: Goal/Amount of Fluid to Remove (mL): 0 mL (12/29/21 1230)     Access   Type & Location: RIght tunneled catheter. Comments:   Good aspirations/flushes. Dressing changed on 12/27/2021.  No s/s infection  Noted                            Labs   HBsAg (Antigen) / date:   Negative on 12/25/2021                                            HBsAb (Antibody) / date: Susceptible on 12/25/2021   Source: EPIC   Obtained/Reviewed  Critical Results Called HGB   Date Value Ref Range Status   12/29/2021 10.1 (L) 12.1 - 17.0 g/dL Final     Potassium   Date Value Ref Range Status   12/29/2021 4.1 3.5 - 5.1 mmol/L Final     Calcium   Date Value Ref Range Status   12/29/2021 9.1 8.5 - 10.1 MG/DL Final     BUN   Date Value Ref Range Status   12/29/2021 62 (H) 6 - 20 MG/DL Final     Creatinine   Date Value Ref Range Status   12/29/2021 6.25 (H) 0.70 - 1.30 MG/DL Final        Meds Given   Name Dose Route   None given                 Adequacy / Fluid    Total Liters Process: 40.5+26.8= 67.3   Net Fluid Removed: 7723-1574=0(zero)      Comments   Time Out Done:   (Time) 1200   Admitting Diagnosis: AMS, Status Epilepticus, Aspiration Pneumonia   Consent obtained/signed: Informed Consent Verified: Yes (12/29/21 1230)   Machine / RO # Machine Number: Demario Voss (12/29/21 1230)   Primary Nurse Rpt Pre: Kesha Salmon RN   Primary Nurse Rpt Post: Kesha Salmon RN   Pt Education: Procedure   Care Plan: Continue HD as prescribed   Pts outpatient [facility]     Tx Summary   Comments:    Treatment initiated via right [place]. Catheter. NS given as fluid replacement. While 75 minutes remaining, machine conductivity fluctuating. Blood returned. And new machine obtained. New system primed treatment restarted at 1520. 1.25 hours made up. Tolerated treatment. All possible blood returned. Catheter ports flushed with NS; sterile caps applied.  Endorsed to primary, Kesha Salmon RN

## 2021-12-29 NOTE — ROUTINE PROCESS
End of Shift Note    Bedside shift change report given to Becky (oncoming nurse) by Julio Watson RN (offgoing nurse). Report included the following information SBAR, Kardex, Intake/Output and MAR    Shift worked:  7p-7a   Shift summary and any significant changes:     new admit       Concerns for physician to address: New admit   Zone phone for oncoming shift:   [phone]     Patient Information  Mitesh Cool [address]  49 y.o.  12/28/2021 11:05 PM by Gold Carrasco MD. [facility]an Dalton [facility]    Problem List  Patient Active Problem List    Diagnosis Date Noted    Status epilepticus ([address] 12/28/2021    Aspiration pneumonia ([address] 12/28/2021    ESRD needing dialysis ([address] 12/28/2021    AMS (altered mental status) 12/28/2021    ATN (acute tubular necrosis) ([address] 12/25/2021    Interstitial nephritis determined by biopsy 12/25/2021    Thrombocytopenia ([address] 12/25/2021    Hypertension 12/25/2021    Sepsis ([address] 12/25/2021    Acute metabolic encephalopathy 44/57/5943    Hypoglycemia 11/25/2021    EUSEBIA (acute kidney injury) ([address] 69/74/6174    Metabolic acidemia 85/01/7215    Intractable nausea and vomiting 11/25/2021    Hyperglycemia due to type 2 diabetes mellitus ([address] 05/05/2019    Hyperosmolar non-ketotic state in patient with type 2 diabetes mellitus ([address] 12/16/2018     Past Medical History:   Diagnosis Date    Asthma     Diabetes ([address]     High cholesterol     Seizure ([address]        Activity:  Activity Level: Bed Rest    Cardiac:   Cardiac Monitoring: Yes      Cardiac Rhythm: Sinus Rhythm    Access:   Current line(s): PIV     Genitourinary:   Urinary status: bal  Urinary Catheter?  Yes Placement Date 12/28 Reason Medically Necessary retention    Respiratory:      Chronic home O2 use?: NO       GI:     Current diet:  DIET NPO       Pain Management:   Patient states pain is manageable on current regimen: N/A    Skin:  [place]o Score: 14  Interventions: PT/OT consult, internal/external urinary devices and nutritional support     Patient Safety:  Fall Score:  Total Score: 3  Interventions: sitter at bedside   High Fall Risk: Yes  @Rollbelt  @dexterity to release roll belt  Yes/No ( must document dexterity  here by stating Yes or No here, otherwise this is a restraint and must follow restraint documentation policy.)    DVT prophylaxis:  DVT prophylaxis Med- No  DVT prophylaxis SCD or ESVIN- No     Wounds: (If Applicable)  Wounds- No  Location     Active Consults:  IP CONSULT TO NEUROLOGY  IP CONSULT TO NEPHROLOGY    Length of Stay:  Expected LOS: - - -  Actual LOS: 1  Discharge Plan: Yes when stable      Susana , RN

## 2021-12-29 NOTE — PROGRESS NOTES
0840 -- Pt BG was 432. Dr Corona Hastings notified, order for lantus and 8 units of sliding scale insulin. 1200 -- Patient has a mews of 3. Temp was 101.5 axillary. BG was 396. Dr Corona Hastings notified at [phone]. No new orders yet. 1300 -- Blood cultures ordered and drawn. 10 units insulin given for high BG    1510 -- Patient has been released from custody, restraints have been discontinued.

## 2021-12-29 NOTE — PROGRESS NOTES
Neurology Progress Note     Patient ID:  Becka Duncan  [id number]  78 y.o.  1965     Subjective:      Mr Anne-Marie Singh remains minimally responsive despite empiric antibiotics and dialysis yesterday.   CSF was fairly unremarkable            Current Facility-Administered Medications   Medication Dose Route Frequency    VANCOMYCIN INFORMATION NOTE   Other Rx Dosing/Monitoring    insulin glargine (LANTUS) injection 3 Units  3 Units SubCUTAneous QHS    cefepime (MAXIPIME) 1 g in sterile water (preservative free) 10 mL IV syringe  1 g IntraVENous Q24H    [START ON 12/27/2021] levoFLOXacin (LEVAQUIN) 500 mg in D5W IVPB  500 mg IntraVENous Q48H    sodium chloride (NS) flush 5-10 mL  5-10 mL IntraVENous PRN    glucose chewable tablet 16 g  4 Tablet Oral PRN    glucagon (GLUCAGEN) injection 1 mg  1 mg IntraMUSCular PRN    dextrose (D50W) injection syrg 12.5-25 g  25-50 mL IntraVENous PRN    levETIRAcetam (KEPPRA) 500 mg in 0.9% sodium chloride (MBP/ADV) 100 mL MBP  500 mg IntraVENous Q12H    sodium chloride (NS) flush 5-40 mL  5-40 mL IntraVENous Q8H    sodium chloride (NS) flush 5-40 mL  5-40 mL IntraVENous PRN    acetaminophen (TYLENOL) tablet 650 mg  650 mg Oral Q6H PRN     Or    acetaminophen (TYLENOL) suppository 650 mg  650 mg Rectal Q6H PRN    polyethylene glycol (MIRALAX) packet 17 g  17 g Oral DAILY PRN    ondansetron (ZOFRAN ODT) tablet 4 mg  4 mg Oral Q8H PRN     Or    ondansetron (ZOFRAN) injection 4 mg  4 mg IntraVENous Q6H PRN    famotidine (PEPCID) tablet 20 mg  20 mg Oral DAILY    [Held by provider] heparin (porcine) injection 5,000 Units  5,000 Units SubCUTAneous Q8H    insulin lispro (HUMALOG) injection   SubCUTAneous Q6H    thiamine (B-1) 250 mg in 0.9% sodium chloride 50 mL IVPB  250 mg IntraVENous Q12H    hydrALAZINE (APRESOLINE) 20 mg/mL injection 10 mg  10 mg IntraVENous Q6H PRN            Objective:      Active hospital medications were reviewed     Lab results and neuroradiology studies from the last 24 hours were reviewed.                Prior to Admission medications    Medication Sig Start Date End Date Taking? Authorizing Provider   atorvastatin (LIPITOR) 10 mg tablet Take 1 Tablet by mouth daily. 12/16/21     Светлана Villegas MD   insulin glargine (Semglee U-100 Insulin) 100 unit/mL injection 20 Units by SubCUTAneous route nightly. 12/16/21     Светлана Villegas MD   levETIRAcetam (KEPPRA) 500 mg tablet Take 1 Tablet by mouth two (2) times a day. 12/16/21     Светлана Villegas MD   amLODIPine (NORVASC) 5 mg tablet Take 1 Tablet by mouth daily. 12/16/21     Светлана Villegas MD   b complex-vitamin c-folic acid (NEPHROCAPS) 1 mg capsule Take 1 Capsule by mouth daily. 12/16/21     Светлана Villegas MD   cyanocobalamin (VITAMIN B12) 2,500 mcg sublingual tablet Take 1 Tablet by mouth daily. 12/16/21     Светлана Villegas MD   epoetin jeff-epbx (RETACRIT) 10,000 unit/mL injection 1 mL by SubCUTAneous route Every Tues, Thur & Sat. Indications: anemia due to kidney failure 12/16/21     Светлана Villegas MD   ferrous sulfate 325 mg (65 mg iron) tablet Take 1 Tablet by mouth daily (with breakfast). 12/16/21     Светлана Villegas MD   folic acid (FOLVITE) 1 mg tablet Take 1 Tablet by mouth daily. 12/16/21     Светлана Villegas MD   insulin lispro (HUMALOG) 100 unit/mL injection 10 units before breakfast  12 units before lunch  15 units before dinner 12/16/21     Светлана Villegas MD   sevelamer carbonate (RENVELA) 800 mg tab tab Take 2 Tablets by mouth three (3) times daily (with meals). Indications: renal osteodystrophy with hyperphosphatemia 12/16/21     Светлана Villegas MD   thiamine HCL (B-1) 100 mg tablet Take 1 Tablet by mouth daily. 12/16/21     Светлана Villegas MD   senna-docusate (PERICOLACE) 8.6-50 mg per tablet Take 2 Tablets by mouth nightly. 12/16/21     Светлана Villegas MD   pantoprazole (PROTONIX) 40 mg tablet Take 1 Tablet by mouth Daily (before breakfast).  12/16/21     Светлана Villegas MD   [facility]) 2 mg tablet Take 1 Tablet by mouth daily. 12/16/21     Dena Quintero MD   cloNIDine (CATAPRES) 0.3 mg/24 hr 1 Patch by TransDERmal route every seven (7) days. 12/16/21     Dena Quintero MD      Patient Vitals for the past 8 hrs:    BP Temp Pulse Resp SpO2 Weight   12/26/21 0441 (!) 182/98 99.3 °F (37.4 °C) 79 16 90 % 57.2 kg (126 lb)   12/26/21 0240 (!) 170/93 (!) 101.9 °F (38.8 °C) 73 16 90 %    [id number]/24 1901 - 12/26 0700  In: 950 [I.V.:950]  Out: 2400 [Urine:400]  12/24 1901 - 12/26 0700  In: 950 [I.V.:950]  Out: 2400 [Urine:400]RESULTRCNT(24h)Active Problems:    Hypoglycemia (11/25/2021)       EUSEBIA (acute kidney injury) ([address] (11/25/2021)       Acute metabolic encephalopathy (06/54/8448)       ATN (acute tubular necrosis) ([address] (12/25/2021)       Interstitial nephritis determined by biopsy (12/25/2021)       Thrombocytopenia ([address] (12/25/2021)       Hypertension (12/25/2021)       Sepsis ([address] (12/25/2021)           Additional comments:I reviewed the patient's new clinical lab test results.         Neurologic Exam     General Exam  No acute distress, normal body habitus     HEENT: Normocephalic, atraumatic, Sclera anicteric, normal conjunctiva  Mucous membranes: normal color and hydration            MENTAL STATUS:     He will arouse to voice and more so to painful stimulation. He does not make eye contact and does have some right hemispatial neglect. He is non verbal  CRANIAL NERVES:   II Pupils are midline, symmetric, both reactive to light and accommodation. Eyes appear conjugate but some left visual field preference     III, IV, VI  Extraocular movements are intact and there is no nystagmus.      VII  some right lower facial weakness is noted.   VIII - Hearing is present.      MOTOR:          Appears to have decreased tone, moving right side less to withdrawal. At one point he did start shaking his left leg semi-rhythmically           Assessment:      Elin Mandujano is a 49 y. o. who was admitted with AMS in the setting of a febrile illness. He remains minimally responsive but will open his eyes. There is some focality to his exam with perhaps some right hemisphere seizure activity or perhaps a left hemisphere structural process. He is on appropriately dosed Keppra given his renal function. Pending a clear infectious source I do believe it would be appropriate to cover him with acyclovir. A HSV PCR is pending     Plan:   1. STAT EEG today  2. Add acyclovir  3.  MRI has been ordered.  Joan Cushing MD  neurology

## 2021-12-29 NOTE — PROGRESS NOTES
Eval attempted, patient was able to make eye contact, moan but not to maintain alertness. Moaned with all attempts at repositioning. Assisted guard in putting socks on patient to protect skin of ankles. Will continue to follow briefly as alertness seems it may be improving, but not ready for PO. Team can consider non oral nutrition if needed.        Ananya Gr M.S., [id number] [facility]  Speech-Language Pathologist
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
## 2021-12-29 NOTE — PROCEDURES
NEUROVASCULAR BRIEF OPERATIVE NOTE- Performed by Dr Andres Thomas and this note was dictated to Dr Casas Monday due to sudden Epic access issues.      [facility]     Date of Procedure:  12/25/2021     Surgeon:  Dr Radha Zuluaga     Preoperative Diagnosis:  encephalopathy     Postoperative Diagnosis:  encephalopathy     Procedure:  Lumbar puncture     Anesthesia:  1% local with lidocaine        Specimens:  16 CC of clear CSF      Complications:  none immediate     The patient was placed in the left side down fetal position and the L4-5 spinal interspace was identified. He was prepped and draped in a sterile manner. Using a 21 Gauge needle CSF was obtained in 1 pass.  An opening pressure of [id number] was obtained and approximately 16 cc of clear CSF was obtained.     The procedure was well tolerated     Andres Thomas M.D.

## 2021-12-29 NOTE — PROCEDURES
Pre-procedure Diagnoses   Encephalopathy [G93.40]     Post-procedure Diagnoses   Encephalopathy [G93.40]     Procedures   EEG AWAKE DROWSY [[id number]]                []Hide copied text    []Gareth for details          METHOD:    This is a 20 channel digital electroencephalogram utilizing the 10-20 International System of Electrode Placement with 1 channel of EKG recording. The record was read using reformatted bipolar and referential electrode montages. The patient was described as awake but not interactive or cooperative.     RESULTS:     Background: In the most alert state, the background is notable for almost continuous semirhythmic generalized slow waves at [facility], 60-80uV which are morphologically consistent with triphasic waves. No posterior background rhythm is noted although some intermittent muscle artifact is seen.      Other activity: At two points during the study, higher amplitude 70-90uV semi-rhythmic and at times rhythmic sharp waves at [facility] are noted to arise from the right frontotemporal region. This is consistent with a focal electrographic seizure.      Photic stimulation: no response.     EKG channel did not show any significant dysrhythmia.        IMPRESSION:   This is an abnormal EEG due to the presence of marked diffuse background slowing associated with triphasic waves and additional focal electrographic seizures arising from the right hemisphere. These findings indicate two different processes: 1. A diffuse encephalopathic process which could could be from toxic, metabolic or parainfectious processes. 2. Focal cortical hyperexcitability in the right hemisphere and associated electrographic seizures.        Daniele Arreola M.D.   Neurologist/Neurophysiologist

## 2021-12-29 NOTE — PROGRESS NOTES
Physical Therapy:  Orders received, chart reviewed, and discussed patient status with RN. Patient transferred from outside facility, had been discharged from therapy services due to unresponsiveness. Patient remains in similar state, disoriented x4 with minimal engagement in environment. Will discontinue PT orders, please re-consult if patient status changes and he becomes appropriate for mobility assessment.     Mena Douglas PT, DPT

## 2021-12-30 LAB
ANION GAP SERPL CALC-SCNC: 8 MMOL/L (ref 5–15)
BACTERIA SPEC CULT: NORMAL
BACTERIA SPEC CULT: NORMAL
BASOPHILS # BLD: 0 K/UL (ref 0–0.1)
BASOPHILS NFR BLD: 0 % (ref 0–1)
BUN SERPL-MCNC: 30 MG/DL (ref 6–20)
BUN/CREAT SERPL: 8 (ref 12–20)
CALCIUM SERPL-MCNC: 8.7 MG/DL (ref 8.5–10.1)
CHLORIDE SERPL-SCNC: 106 MMOL/L (ref 97–108)
CO2 SERPL-SCNC: 28 MMOL/L (ref 21–32)
CREAT SERPL-MCNC: 3.8 MG/DL (ref 0.7–1.3)
DIFFERENTIAL METHOD BLD: ABNORMAL
EOSINOPHIL # BLD: 0 K/UL (ref 0–0.4)
EOSINOPHIL NFR BLD: 0 % (ref 0–7)
ERYTHROCYTE [DISTWIDTH] IN BLOOD BY AUTOMATED COUNT: 15.3 % (ref 11.5–14.5)
GLUCOSE BLD STRIP.AUTO-MCNC: 103 MG/DL (ref 65–117)
GLUCOSE BLD STRIP.AUTO-MCNC: 143 MG/DL (ref 65–117)
GLUCOSE BLD STRIP.AUTO-MCNC: 147 MG/DL (ref 65–117)
GLUCOSE BLD STRIP.AUTO-MCNC: 225 MG/DL (ref 65–117)
GLUCOSE SERPL-MCNC: 133 MG/DL (ref 65–100)
HCT VFR BLD AUTO: 30.9 % (ref 36.6–50.3)
HGB BLD-MCNC: 10.1 G/DL (ref 12.1–17)
IMM GRANULOCYTES # BLD AUTO: 0.1 K/UL (ref 0–0.04)
IMM GRANULOCYTES NFR BLD AUTO: 1 % (ref 0–0.5)
LEVETIRACETAM SERPL-MCNC: 23.1 UG/ML (ref 10–40)
LYMPHOCYTES # BLD: 1 K/UL (ref 0.8–3.5)
LYMPHOCYTES NFR BLD: 10 % (ref 12–49)
MCH RBC QN AUTO: 30.9 PG (ref 26–34)
MCHC RBC AUTO-ENTMCNC: 32.7 G/DL (ref 30–36.5)
MCV RBC AUTO: 94.5 FL (ref 80–99)
MONOCYTES # BLD: 0.9 K/UL (ref 0–1)
MONOCYTES NFR BLD: 9 % (ref 5–13)
NEUTS SEG # BLD: 7.9 K/UL (ref 1.8–8)
NEUTS SEG NFR BLD: 80 % (ref 32–75)
NRBC # BLD: 0 K/UL (ref 0–0.01)
NRBC BLD-RTO: 0 PER 100 WBC
PLATELET # BLD AUTO: 201 K/UL (ref 150–400)
PMV BLD AUTO: 10.7 FL (ref 8.9–12.9)
POTASSIUM SERPL-SCNC: 3.4 MMOL/L (ref 3.5–5.1)
PROCALCITONIN SERPL-MCNC: 0.47 NG/ML
RBC # BLD AUTO: 3.27 M/UL (ref 4.1–5.7)
SERVICE CMNT-IMP: ABNORMAL
SERVICE CMNT-IMP: NORMAL
SODIUM SERPL-SCNC: 142 MMOL/L (ref 136–145)
WBC # BLD AUTO: 9.8 K/UL (ref 4.1–11.1)

## 2021-12-30 PROCEDURE — 80048 BASIC METABOLIC PNL TOTAL CA: CPT

## 2021-12-30 PROCEDURE — 95816 EEG AWAKE AND DROWSY: CPT | Performed by: PSYCHIATRY & NEUROLOGY

## 2021-12-30 PROCEDURE — 82962 GLUCOSE BLOOD TEST: CPT

## 2021-12-30 PROCEDURE — 65660000000 HC RM CCU STEPDOWN

## 2021-12-30 PROCEDURE — 84145 PROCALCITONIN (PCT): CPT

## 2021-12-30 PROCEDURE — 36415 COLL VENOUS BLD VENIPUNCTURE: CPT

## 2021-12-30 PROCEDURE — C9254 INJECTION, LACOSAMIDE: HCPCS | Performed by: HOSPITALIST

## 2021-12-30 PROCEDURE — 99233 SBSQ HOSP IP/OBS HIGH 50: CPT | Performed by: PSYCHIATRY & NEUROLOGY

## 2021-12-30 PROCEDURE — 85025 COMPLETE CBC W/AUTO DIFF WBC: CPT

## 2021-12-30 PROCEDURE — 74011000258 HC RX REV CODE- 258: Performed by: HOSPITALIST

## 2021-12-30 PROCEDURE — 74011250636 HC RX REV CODE- 250/636: Performed by: HOSPITALIST

## 2021-12-30 PROCEDURE — 74011250637 HC RX REV CODE- 250/637: Performed by: PSYCHIATRY & NEUROLOGY

## 2021-12-30 PROCEDURE — 74011250636 HC RX REV CODE- 250/636: Performed by: NURSE PRACTITIONER

## 2021-12-30 PROCEDURE — 92610 EVALUATE SWALLOWING FUNCTION: CPT

## 2021-12-30 PROCEDURE — 74011250637 HC RX REV CODE- 250/637: Performed by: INTERNAL MEDICINE

## 2021-12-30 PROCEDURE — 74011636637 HC RX REV CODE- 636/637: Performed by: STUDENT IN AN ORGANIZED HEALTH CARE EDUCATION/TRAINING PROGRAM

## 2021-12-30 PROCEDURE — 74011636637 HC RX REV CODE- 636/637: Performed by: HOSPITALIST

## 2021-12-30 RX ORDER — POTASSIUM CHLORIDE 7.45 MG/ML
10 INJECTION INTRAVENOUS ONCE
Status: COMPLETED | OUTPATIENT
Start: 2021-12-30 | End: 2021-12-30

## 2021-12-30 RX ORDER — LEVETIRACETAM 500 MG/1
500 TABLET ORAL 2 TIMES DAILY
Status: DISCONTINUED | OUTPATIENT
Start: 2021-12-30 | End: 2022-01-22 | Stop reason: HOSPADM

## 2021-12-30 RX ORDER — BUMETANIDE 1 MG/1
1 TABLET ORAL 2 TIMES DAILY
Status: DISCONTINUED | OUTPATIENT
Start: 2021-12-30 | End: 2022-01-03

## 2021-12-30 RX ORDER — LACOSAMIDE 50 MG/1
100 TABLET ORAL EVERY 12 HOURS
Status: DISCONTINUED | OUTPATIENT
Start: 2021-12-30 | End: 2022-01-22 | Stop reason: HOSPADM

## 2021-12-30 RX ORDER — LORAZEPAM 2 MG/ML
2 INJECTION INTRAMUSCULAR ONCE
Status: COMPLETED | OUTPATIENT
Start: 2021-12-30 | End: 2021-12-30

## 2021-12-30 RX ADMIN — THIAMINE HYDROCHLORIDE 250 MG: 100 INJECTION, SOLUTION INTRAMUSCULAR; INTRAVENOUS at 12:36

## 2021-12-30 RX ADMIN — BUMETANIDE 1 MG: 1 TABLET ORAL at 17:57

## 2021-12-30 RX ADMIN — Medication 2 UNITS: at 12:36

## 2021-12-30 RX ADMIN — ACYCLOVIR SODIUM 280 MG: 50 INJECTION, SOLUTION INTRAVENOUS at 00:58

## 2021-12-30 RX ADMIN — SODIUM CHLORIDE 100 MG: 9 INJECTION, SOLUTION INTRAVENOUS at 11:46

## 2021-12-30 RX ADMIN — LEVETIRACETAM 500 MG: 100 INJECTION, SOLUTION INTRAVENOUS at 00:03

## 2021-12-30 RX ADMIN — Medication 15 UNITS: at 10:56

## 2021-12-30 RX ADMIN — LEVETIRACETAM 500 MG: 100 INJECTION, SOLUTION INTRAVENOUS at 10:56

## 2021-12-30 RX ADMIN — LORAZEPAM 2 MG: 2 INJECTION INTRAMUSCULAR; INTRAVENOUS at 22:03

## 2021-12-30 RX ADMIN — POTASSIUM CHLORIDE 10 MEQ: 7.46 INJECTION, SOLUTION INTRAVENOUS at 03:37

## 2021-12-30 RX ADMIN — LEVETIRACETAM 500 MG: 500 TABLET, FILM COATED ORAL at 17:57

## 2021-12-30 RX ADMIN — LACOSAMIDE 100 MG: 50 TABLET, FILM COATED ORAL at 21:40

## 2021-12-30 RX ADMIN — SODIUM CHLORIDE 100 MG: 9 INJECTION, SOLUTION INTRAVENOUS at 00:25

## 2021-12-30 RX ADMIN — ACYCLOVIR SODIUM 280 MG: 50 INJECTION, SOLUTION INTRAVENOUS at 23:42

## 2021-12-30 RX ADMIN — BUMETANIDE 1 MG: 1 TABLET ORAL at 10:56

## 2021-12-30 NOTE — PROGRESS NOTES
Problem: Falls - Risk of  Goal: *Absence of Falls  Description: Document Virlinda Gamma Fall Risk and appropriate interventions in the flowsheet. Outcome: Progressing Towards Goal  Note: Fall Risk Interventions:  Mobility Interventions: Bed/chair exit alarm,Communicate number of staff needed for ambulation/transfer    Mentation Interventions: Adequate sleep, hydration, pain control,Bed/chair exit alarm    Medication Interventions: Bed/chair exit alarm,Patient to call before getting OOB    Elimination Interventions: Bed/chair exit alarm,Call light in reach              Problem: Pressure Injury - Risk of  Goal: *Prevention of pressure injury  Description: Document Loco Scale and appropriate interventions in the flowsheet.   Outcome: Progressing Towards Goal  Note: Pressure Injury Interventions:  Sensory Interventions: Minimize linen layers    Moisture Interventions: Internal/External urinary devices,Minimize layers    Activity Interventions: Assess need for specialty bed    Mobility Interventions: Assess need for specialty bed    Nutrition Interventions: Document food/fluid/supplement intake    Friction and Shear Interventions: Minimize layers                Problem: Seizure Disorder (Adult)  Goal: *STG: Remains free of seizure activity  Outcome: Progressing Towards Goal  Goal: *STG: Maintains lab values within therapeutic range  Outcome: Progressing Towards Goal  Goal: *STG/LTG: Complies with medication therapy  Outcome: Progressing Towards Goal  Goal: *STG: Remains free of injury during seizure activity  Outcome: Progressing Towards Goal  Goal: *STG: Remains safe in hospital  Outcome: Progressing Towards Goal  Goal: Interventions  Outcome: Progressing Towards Goal

## 2021-12-30 NOTE — PROGRESS NOTES
Chief Complaint: No complaints    Laying in bed confused. I was unable to communicate with him. I'm unsure to wether he can hear and not read lips or wether he basically cant understand. Assesment and Plan  1. Seizures  None seen on EEG    2. Disorientation  He compensates by being offensive and dismissive rather than informative. His EEG and imaging studies have been uninformative as well. I tried to reach out to his contact this number is not in service and the only other contact was his care home  It seems that he was able to communicate when he was arrested, telling the arrested officer that his sugar was low. 3. ESRD needing dialysis ([facility]  On dialysis         Allergies  Patient has no known allergies.      Medications  Current Facility-Administered Medications   Medication Dose Route Frequency    bumetanide (BUMEX) tablet 1 mg  1 mg Oral BID    insulin glargine (LANTUS) injection 15 Units  15 Units SubCUTAneous DAILY    acyclovir (ZOVIRAX) 280 mg in 0.9% sodium chloride 100 mL IVPB  280 mg IntraVENous Q24H    lacosamide (VIMPAT) 100 mg in 0.9% sodium chloride 100 mL IVPB  100 mg IntraVENous Q12H    levoFLOXacin (LEVAQUIN) 500 mg in D5W IVPB  500 mg IntraVENous Q48H    thiamine (B-1) 250 mg in 0.9% sodium chloride 50 mL IVPB  250 mg IntraVENous DAILY    hydrALAZINE (APRESOLINE) 20 mg/mL injection 10 mg  10 mg IntraVENous Q6H PRN    ondansetron (ZOFRAN ODT) tablet 4 mg  4 mg Oral Q8H PRN    Or    ondansetron (ZOFRAN) injection 4 mg  4 mg IntraVENous Q6H PRN    acetaminophen (TYLENOL) tablet 650 mg  650 mg Oral Q4H PRN    Or    acetaminophen (TYLENOL) solution 650 mg  650 mg Per NG tube Q4H PRN    Or    acetaminophen (TYLENOL) suppository 650 mg  650 mg Rectal Q4H PRN    levETIRAcetam (KEPPRA) 500 mg in 0.9% sodium chloride 100 mL IVPB  500 mg IntraVENous Q12H    insulin lispro (HUMALOG) injection   SubCUTAneous AC&HS    glucose chewable tablet 16 g  4 Tablet Oral PRN    dextrose (D50W) injection syrg 12.5-25 g  12.5-25 g IntraVENous PRN    glucagon (GLUCAGEN) injection 1 mg  1 mg IntraMUSCular PRN        Medical History  Past Medical History:   Diagnosis Date    Asthma     Diabetes ([facility] [address] [facility]     High cholesterol     Seizure ([facility]      Review of Systems   Unable to perform ROS: Mental acuity       Exam:    Visit Vitals  BP (!) 155/87   Pulse 67   Temp 98.4 °F (36.9 °C)   Resp 20   SpO2 94%        General: Well developed, well nourished. Patient in no apparent distress   Head: Normocephalic, atraumatic, anicteric sclera   Neck Normal ROM, No thyromegally   Cardiac: Regular rate and rhythm   Ext: No pedal edema   Skin: Supple no rash     NeurologicExam:  Mental Status: Alert and oriented to person    Speech: Fluent no aphasia   Cranial Nerves:  II - XII Intact   Motor:  Full and symmetric strength of upper and lower ext. Normal bulk and tone. Sensory:   Symmetric and intact    Gait:  deferred    Tremor:   No tremor noted. Cerebellar:  Coordination intact.                Lab Review  Lab Results   Component Value Date/Time    WBC 9.8 12/30/2021 01:59 AM    HCT 30.9 (L) 12/30/2021 01:59 AM    HGB 10.1 (L) 12/30/2021 01:59 AM    PLATELET 502 90/33/5228 01:59 AM       Lab Results   Component Value Date/Time    Sodium 142 12/30/2021 01:59 AM    Potassium 3.4 (L) 12/30/2021 01:59 AM    Chloride 106 12/30/2021 01:59 AM    CO2 28 12/30/2021 01:59 AM    Glucose 133 (H) 12/30/2021 01:59 AM    BUN 30 (H) 12/30/2021 01:59 AM    Creatinine 3.80 (H) 12/30/2021 01:59 AM    Calcium 8.7 12/30/2021 01:59 AM       Lab Results   Component Value Date/Time    Hemoglobin A1c 5.9 (H) 12/29/2021 01:40 AM        Lab Results   Component Value Date/Time    Folate 6.4 11/26/2021 02:04 AM         Lab Results   Component Value Date/Time    Cholesterol, total 162 12/29/2021 01:40 AM    HDL Cholesterol 53 12/29/2021 01:40 AM    LDL, calculated 80.8 12/29/2021 01:40 AM    VLDL, calculated 28.2 12/29/2021 01:40 AM    Triglyceride 141 12/29/2021 01:40 AM    CHOL/HDL Ratio 3.1 12/29/2021 01:40 AM

## 2021-12-30 NOTE — PROGRESS NOTES
End of Shift Note    Bedside shift change report given to GERARDO Ulloa (oncoming nurse) by Jay Jacob RN (offgoing nurse). Report included the following information SBAR, Kardex and ED Summary    Shift worked:  days   Shift summary and any significant changes:     Dialysis done today. EEG was ordered. At 1510, pt was released from custody. Restraints discontinued and guards left. Pt had several high BG today, see earlier note for more details. Concerns for physician to address:  none   Zone phone for oncoming shift:   [phone]     Patient Information  [place] [facility] [address]  49 y.o.  12/28/2021 11:05 PM by Kalia Espinoza MD. Deisy Nicholson was admitted    Problem List  Patient Active Problem List    Diagnosis Date Noted    Status epilepticus ([address] 12/28/2021    Aspiration pneumonia ([address] 12/28/2021    ESRD needing dialysis ([address] 12/28/2021    AMS (altered mental status) 12/28/2021    ATN (acute tubular necrosis) ([address] 12/25/2021    Interstitial nephritis determined by biopsy 12/25/2021    Thrombocytopenia ([address] 12/25/2021    Hypertension 12/25/2021    Sepsis ([address] 12/25/2021    Acute metabolic encephalopathy 89/65/2885    Hypoglycemia 11/25/2021    EUSEBIA (acute kidney injury) ([address] 41/81/5189    Metabolic acidemia 66/96/7597    Intractable nausea and vomiting 11/25/2021    Hyperglycemia due to type 2 diabetes mellitus ([address] 05/05/2019    Hyperosmolar non-ketotic state in patient with type 2 diabetes mellitus ([address] 12/16/2018     Past Medical History:   Diagnosis Date    Asthma     Diabetes ([address]     High cholesterol     Seizure ([address]        Activity:  Activity Level: Bed Rest  Number times ambulated in hallways past shift: 0  Number of times OOB to chair past shift: 0    Cardiac:   Cardiac Monitoring: Yes      Cardiac Rhythm: Sinus Rhythm    Access:   Current line(s): PIV and HD access   Central Line? Yes Placement date  Reason Medically Necessary Hemodialysis  PICC LINE?  No Placement date Reason Medically Necessary     Genitourinary:   Urinary status: incontinent  Urinary Catheter? Yes Placement Date 12/24 Reason Medically Necessary retention    Respiratory:   O2 Device: None (Room air)  Chronic home O2 use?: NO  Incentive spirometer at bedside: NO       GI:     Current diet:  DIET NPO  Passing flatus: YES  Tolerating current diet: NO NPO       Pain Management:   Patient states pain is manageable on current regimen: N/A, nonverbal    Skin:  Loco Score: 14  Interventions: float heels, internal/external urinary devices and nutritional support     Patient Safety:  Fall Score:  Total Score: 3  Interventions: bed/chair alarm  High Fall Risk: Yes  @Rollbelt  @dexterity to release roll belt  Yes/No ( must document dexterity  here by stating Yes or No here, otherwise this is a restraint and must follow restraint documentation policy.)    DVT prophylaxis:  DVT prophylaxis Med- Yes  DVT prophylaxis SCD or ESVIN- No     Wounds: (If Applicable)  Wounds- No  Location     Active Consults:  IP CONSULT TO NEUROLOGY  IP CONSULT TO NEPHROLOGY    Length of Stay:  Expected LOS: - - -  Actual LOS: 1  Discharge Plan: No MAHOGANY Bar RN

## 2021-12-30 NOTE — PROGRESS NOTES
Performed dysphagia screen on patient, he was alert and oriented to self and requesting water. Passed dysphagia screen with no difficulty, may be ready for diet orders.  Will pass on to dayshift

## 2021-12-30 NOTE — PROGRESS NOTES
Physician Progress Note      Gabi Burrell  CSN #:                  [id number]  :                       1965  ADMIT DATE:       2021 2:11 PM  [address] DATE:        2021 8:45 PM  RESPONDING  PROVIDER #:        Hilaria Stockton MD          QUERY TEXT:    Documentation reflects concern for sepsis, hypoglycemia, acute metabolic encephalopathy in the H&P. If possible, please document in the progress notes and discharge summary if sepsis was: The medical record reflects the following:  Risk Factors: ESRD; Type 1 DM  Clinical Indicators: H&P notes acute encephalopathy and low-grade fevers; concern for sepsis  WBC up to 14.3 on 21; T max 102.3 Rectal    Progress note: Thrombocytopenia in the setting of sepsis. Close monitoring. Treatment: Levaquin; ID consult; blood cultures; LP    Thank you,  Rubina Javier RN/CCDS  Options provided:  -- Sepsis POA confirmed after study  -- Sepsis POA treated and resolved  -- Sepsis ruled out after study  -- Fever of unknown etiology with empiric antimicrobial therapy with sepsis ruled out  -- Other - I will add my own diagnosis  -- Disagree - Not applicable / Not valid  -- Disagree - Clinically unable to determine / Unknown  -- Refer to Clinical Documentation Reviewer    PROVIDER RESPONSE TEXT:    Sepsis POA confirmed after study.     Query created by: Mariana Del Rosario on 2021 9:01 AM      Electronically signed by:  Hilaria Stockton MD 2021 3:57 PM

## 2021-12-30 NOTE — PROGRESS NOTES
[address]     Electroencephalogram Report    Date: 12/30/21    Patient Name: Becka Duncan  YOB: 1965   Medical Record No: [id number]    Procedure ID: [id number]  Procedure Type: Routine    INDICATION: Altered Mentation    STATE: Awake/Drowsy    IMPRESSION:  Normal. No seizure. Absence of seizures on this study does not exclude epilepsy. Clinical correlation is advised. DESCRIPTION OF PROCEDURE: Electrodes were applied in accordance with the international 10-20 system of electrode placement. EEG was reviewed in both bipolar and referential montages. DESCRIPTION OF FINDINGS: Background consists of symmetric 8-10 Hz rhythm with frontal beta activity. This rhythm attenuates with eye opening. With drowsiness there is a drop off of the background. With photic stimulation symmetric occipital drive was seen. No seizures were noted. No apparent spike waves or focal slowing noted.      Medications:  Current Facility-Administered Medications   Medication Dose Route Frequency    bumetanide (BUMEX) tablet 1 mg  1 mg Oral BID    insulin glargine (LANTUS) injection 15 Units  15 Units SubCUTAneous DAILY    acyclovir (ZOVIRAX) 280 mg in 0.9% sodium chloride 100 mL IVPB  280 mg IntraVENous Q24H    lacosamide (VIMPAT) 100 mg in 0.9% sodium chloride 100 mL IVPB  100 mg IntraVENous Q12H    levoFLOXacin (LEVAQUIN) 500 mg in D5W IVPB  500 mg IntraVENous Q48H    thiamine (B-1) 250 mg in 0.9% sodium chloride 50 mL IVPB  250 mg IntraVENous DAILY    hydrALAZINE (APRESOLINE) 20 mg/mL injection 10 mg  10 mg IntraVENous Q6H PRN    ondansetron (ZOFRAN ODT) tablet 4 mg  4 mg Oral Q8H PRN    Or    ondansetron (ZOFRAN) injection 4 mg  4 mg IntraVENous Q6H PRN    acetaminophen (TYLENOL) tablet 650 mg  650 mg Oral Q4H PRN    Or    acetaminophen (TYLENOL) solution 650 mg  650 mg Per NG tube Q4H PRN    Or    acetaminophen (TYLENOL) suppository 650 mg  650 mg Rectal Q4H PRN    levETIRAcetam (KEPPRA) 500 mg in 0.9% sodium chloride 100 mL IVPB  500 mg IntraVENous Q12H    insulin lispro (HUMALOG) injection   SubCUTAneous AC&HS    glucose chewable tablet 16 g  4 Tablet Oral PRN    dextrose (D50W) injection syrg 12.5-25 g  12.5-25 g IntraVENous PRN    glucagon (GLUCAGEN) injection 1 mg  1 mg IntraMUSCular PRN

## 2021-12-30 NOTE — PROGRESS NOTES
Nephrology Progress Note  Dorian Antonio     [email or web address]  Phone - [phone]   Patient: Nellie Phipps    YOB: 1965        Date- 12/30/2021   Admit Date: 12/28/2021  CC: Follow up for EUSEBIA        IMPRESSION & PLAN:    EUSEBIA on dialysis Monday Wednesday Friday with Dianne Maria Eug[facility] Patel [facility] nephrology   Metabolic encephalopathy   History of hypoglycemia   Interstitial nephritis determined by biopsy NSAID induced   Hypertension   Sepsis   Anemia    PLAN-   We will keep him on MWF dialysis schedule and dialyze him tomorrow.  Hemoglobin stable no need for EPO   Continue with blood pressure medications   Patient is still EUSEBIA so will start on Bumex 1 mg twice daily to stimulate urine output.  Ongoing work-up for metabolic encephalopathy and seizures per neurology.  Thank you for the consult, will follow with you     Subjective: Interval History:   -Patient was seen and examined   -Awake and communicative today    Objective:   Vitals:    12/29/21 2124 12/29/21 2302 12/30/21 0314 12/30/21 0700   BP:  (!) 159/77 (!) 166/83 (!) 155/87   Pulse:  62 75 67   Resp:  20 19 20   Temp: 97.6 °F (36.4 °C) 96.9 °F (36.1 °C) 98.3 °F (36.8 °C) 98.4 °F (36.9 °C)   TempSrc:       SpO2:  93% 92% 94%      12/29 0701 - 12/30 0700  In: -   Out: 650 [Urine:650]  There were no vitals filed for this visit. Physical exam:   GEN: Altered mental status, cachectic  NECK- Supple, no mass  RESP: No wheezing, Clear b/l  CVS: S1,S2  RRR  NEURO: Altered mental status  EXT: No Edema       Chart reviewed. Pertinent Notes reviewed.      Data Review :  Recent Labs     12/30/21 0159 12/29/21  0140 12/28/21  0403    137 136   K 3.4* 4.1 3.8    100 100   CO2 28 20* 26   BUN 30* 62* 27*   CREA 3.80* 6.25* 3.84*   * 359* 199*   CA 8.7 9.1 8.5   PHOS  --  6.6*  --      Recent Labs     12/30/21  0159 12/29/21  0140 12/28/21  0403   WBC 9.8 10.8 11.9   HGB 10.1* 10.1* 10.5*   HCT 30.9* 31.7* 33.3*    196 174     No results for input(s): FE, TIBC, PSAT, FERR in the last 72 hours.    Medication list  reviewed  Current Facility-Administered Medications   Medication Dose Route Frequency    insulin glargine (LANTUS) injection 15 Units  15 Units SubCUTAneous DAILY    acyclovir (ZOVIRAX) 280 mg in 0.9% sodium chloride 100 mL IVPB  280 mg IntraVENous Q24H    lacosamide (VIMPAT) 100 mg in 0.9% sodium chloride 100 mL IVPB  100 mg IntraVENous Q12H    levoFLOXacin (LEVAQUIN) 500 mg in D5W IVPB  500 mg IntraVENous Q48H    thiamine (B-1) 250 mg in 0.9% sodium chloride 50 mL IVPB  250 mg IntraVENous DAILY    hydrALAZINE (APRESOLINE) 20 mg/mL injection 10 mg  10 mg IntraVENous Q6H PRN    ondansetron (ZOFRAN ODT) tablet 4 mg  4 mg Oral Q8H PRN    Or    ondansetron (ZOFRAN) injection 4 mg  4 mg IntraVENous Q6H PRN    acetaminophen (TYLENOL) tablet 650 mg  650 mg Oral Q4H PRN    Or    acetaminophen (TYLENOL) solution 650 mg  650 mg Per NG tube Q4H PRN    Or    acetaminophen (TYLENOL) suppository 650 mg  650 mg Rectal Q4H PRN    levETIRAcetam (KEPPRA) 500 mg in 0.9% sodium chloride 100 mL IVPB  500 mg IntraVENous Q12H    insulin lispro (HUMALOG) injection   SubCUTAneous AC&HS    glucose chewable tablet 16 g  4 Tablet Oral PRN    dextrose (D50W) injection syrg 12.5-25 g  12.5-25 g IntraVENous PRN    glucagon (GLUCAGEN) injection 1 mg  1 mg IntraMUSCular PRN          Nevaeh Genao MD              [facility]  [facility] [address]  [place], [address]  Phone - [phone]               Fax - [phone]

## 2021-12-30 NOTE — PROGRESS NOTES
Received message from patient's nurse stating:    [place] level 3.4         Discussion / orders:    Potassium chloride 10 mEq IV x1         Please note that this note was dictated using Dragon computer voice recognition software. Quite often unanticipated grammatical, syntax, homophones, and other interpretive errors are inadvertently transcribed by the computer software. Please disregard these errors. Please excuse any errors that have escaped final proofreading.      Signed by:  Denzel Pham DNP, ACNP-BC

## 2021-12-30 NOTE — PROGRESS NOTES
1: 46 p.m. CM tried to speak to pt re: his background. Pt not able to recall background information at this time. CM  Marilin Adair stated we may have to do a family check. 10:51 a.m.  CM attended IDR. Nursing informed guards left pt stating that his time was up. CM informed  for advice on disposition. Pt is an inmate at [facility]. Pt will return once medically stable.     Braden Francisco MSW  Care Management, [facility]

## 2021-12-30 NOTE — PROGRESS NOTES
End of Shift Note    Bedside shift change report given to Becky (oncoming nurse) by VERONICA Dubon (offgoing nurse).   Report included the following information SBAR    Shift worked:  [id number]   Shift summary and any significant changes:     Patient is oriented to self, talking more and forming sentences- completed dysphagia screen as patient was requesting water, passed with no difficulty; one run of IV potassium given for K level of 3.4     Concerns for physician to address:  None   Zone phone for oncoming shift:   [phone]     Patient Information  [facility] [address]  49 y.o.  12/28/2021 11:05 PM by Alyson Blackwell MD. Sujey Carballo was admitted from [facility]     Problem List  Patient Active Problem List    Diagnosis Date Noted    Status epilepticus ([address] 12/28/2021    Aspiration pneumonia ([address] 12/28/2021    ESRD needing dialysis ([address] 12/28/2021    AMS (altered mental status) 12/28/2021    ATN (acute tubular necrosis) ([address] 12/25/2021    Interstitial nephritis determined by biopsy 12/25/2021    Thrombocytopenia ([address] 12/25/2021    Hypertension 12/25/2021    Sepsis ([address] 12/25/2021    Acute metabolic encephalopathy 97/35/9095    Hypoglycemia 11/25/2021    EUSEBIA (acute kidney injury) ([address] 26/51/5320    Metabolic acidemia 60/88/1454    Intractable nausea and vomiting 11/25/2021    Hyperglycemia due to type 2 diabetes mellitus ([address] 05/05/2019    Hyperosmolar non-ketotic state in patient with type 2 diabetes mellitus ([address] 12/16/2018     Past Medical History:   Diagnosis Date    Asthma     Diabetes ([address]     High cholesterol     Seizure ([address]        Core Measures:  CVA: No No  CHF:No No  PNA:No No    Activity:  Activity Level: Bed Rest  Number times ambulated in hallways past shift: 0  Number of times OOB to chair past shift: 0    Cardiac:   Cardiac Monitoring: Yes      Cardiac Rhythm: Sinus Rhythm    Access:   Current line(s): PIV and HD access     Genitourinary:   Urinary status: bal  Urinary Catheter? Yes Placement Date 12/28/2021 Reason Medically Necessary retention    Respiratory:   O2 Device: None (Room air)  Chronic home O2 use?: N/A  Incentive spirometer at bedside: N/A       GI:  Last Bowel Movement Date:  (unknown)  Current diet:  DIET NPO  Passing flatus: YES  Tolerating current diet: NO       Pain Management:   Patient states pain is manageable on current regimen: N/A    Skin:  Loco Score: 13  Interventions: increase time out of bed, limit briefs and internal/external urinary devices    Patient Safety:  Fall Score:  Total Score: 3  Interventions: bed/chair alarm, gripper socks and pt to call before getting OOB  High Fall Risk: Yes  @Rollbelt  @dexterity to release roll belt  Yes/No ( must document dexterity  here by stating Yes or No here, otherwise this is a restraint and must follow restraint documentation policy.)    DVT prophylaxis:  DVT prophylaxis Med- No  DVT prophylaxis SCD or ESVIN- No     Wounds: (If Applicable)  Wounds- No  Location     Active Consults:  IP CONSULT TO NEUROLOGY  IP CONSULT TO NEPHROLOGY    Length of Stay:  Expected LOS: - - -  Actual LOS: 2  Discharge Plan: No; VERONICA Ward
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
## 2021-12-30 NOTE — PROGRESS NOTES
Informed Purveyor NP about patients 3.4 potassium level, message read but no new orders given.  Will continue to monitor patient and make dayshift aware of potassium level     Update: one run of IV potassium ordered, will give now

## 2021-12-30 NOTE — PROGRESS NOTES
Hospitalist Progress Note    NAME: Arely Quintero   :  1965   MRN:  [id number]       Assessment / Plan:  Acute metabolic encephalopathy  Status epilepticus  History of seizure disorder  Aspiration pneumonia  MRI brainno acute abnormality. CT headno acute abnormality. Lumbar puncture doneCSF reveals no infectious etiology so far. CSF culture negative. CSF HSV pending, when the results are back then will decide about the acyclovir. CSF meningitis panel negative. Out side hospital EEG indication triphasic slowing and focal 3hz right fronto temproal field siezure activety. EEG done here shows no seizure. On continuous EEG. Neurology on boardappreciate input. Continue Keppra along with Vimpat. Seizure precaution. Chest x-rayno acute pathology.  -Continue Levaquin as the patient still having a fever. WBC 9.8, repeat procalcitonin pending. Initial blood culture at the other hospital negative, blood culture repeat yesterday shows initial results negative. Patient much more awake, alert and oriented to self but not to the place and person. Patient interactive. End-stage renal disease on dialysis  Nephrology was consultedhemodialysis per nephrology. Dialysis daysMonday, Wednesday and Friday. Started on Bumex 1 mg twice daily by the nephrology. Hypertension  Diabetes type 2  Hyperlipidemia  Polysubstance abuse  Sliding scale insulin along with Lantus. There is no height or weight on file to calculate BMI. Estimated discharge date:   Barriers:    Code status: Full  Prophylaxis: Lovenox  Recommended Disposition:      Subjective:     Chief Complaint / Reason for Physician Visit  Patient seen today, much more awake and alert, interactive, oriented to self but not to the place and the person. Knows the name but not the place and the birth date. Objective:     VITALS:   Last 24hrs VS reviewed since prior progress note.  Most recent are:  Patient Vitals for the past 24 hrs:   Temp Pulse Resp BP SpO2   12/30/21 0700 98.4 °F (36.9 °C) 67 20 (!) 155/87 94 %   12/30/21 0314 98.3 °F (36.8 °C) 75 19 (!) 166/83 92 %   12/29/21 2302 96.9 °F (36.1 °C) 62 20 (!) 159/77 93 %   12/29/21 2124 97.6 °F (36.4 °C)       12/29/21 1939 98.2 °F (36.8 °C) 69 17 (!) 155/79 95 %   12/29/21 1635 98 °F (36.7 °C) 69 16 138/86    12/29/21 1615  67  132/86    12/29/21 1600  78  134/85    12/29/21 1545  66  (!) 144/84    12/29/21 1530  72  130/83    12/29/21 1520  83 18 (!) 164/78    12/29/21 1430  78  119/84    12/29/21 1415  77  138/87    12/29/21 1400  83  (!) 130/90    12/29/21 1345  85  (!) 135/90        Intake/Output Summary (Last 24 hours) at 12/30/2021 1343  Last data filed at 12/30/2021 0307  Gross per 24 hour   Intake    Output 650 ml   Net -650 ml        I had a face to face encounter and independently examined this patient on 12/30/2021, as outlined below:  PHYSICAL EXAM:  General: WD, WN. Awake, alert, cooperative, no acute distress    EENT:  EOMI. Anicteric sclerae. MMM  Resp:  CTA bilaterally, no wheezing or rales. No accessory muscle use  CV:  Regular  rhythm,  No edema  GI:  Soft, Non distended, Non tender. +Bowel sounds  Neurologic:  Alert and oriented x1, normal speech,   Psych:   Good insight. Not anxious nor agitated  Skin:  No rashes. No jaundice    Reviewed most current lab test results and cultures  YES  Reviewed most current radiology test results   YES  Review and summation of old records today    NO  Reviewed patient's current orders and MAR    YES  PMH/SH reviewed - no change compared to H&P  ________________________________________________________________________  Care Plan discussed with:    Comments   Patient x    Family      RN x    Care Manager     Consultant                       x Multidiciplinary team rounds were held today with , nursing, pharmacist and clinical coordinator.   Patient's plan of care was discussed; medications were reviewed and discharge planning was addressed. ________________________________________________________________________  Total NON critical care TIME:  38  Minutes    Total CRITICAL CARE TIME Spent:   Minutes non procedure based      Comments   >50% of visit spent in counseling and coordination of care     ________________________________________________________________________  Lars Drew MD     Procedures: see electronic medical records for all procedures/Xrays and details which were not copied into this note but were reviewed prior to creation of Plan. LABS:  I reviewed today's most current labs and imaging studies.   Pertinent labs include:  Recent Labs     12/30/21  0159 12/29/21  0140 12/28/21  0403   WBC 9.8 10.8 11.9   HGB 10.1* 10.1* 10.5*   HCT 30.9* 31.7* 33.3*    196 174     Recent Labs     12/30/21  0159 12/29/21  0140 12/28/21  0403    137 136   K 3.4* 4.1 3.8    100 100   CO2 28 20* 26   * 359* 199*   BUN 30* 62* 27*   CREA 3.80* 6.25* 3.84*   CA 8.7 9.1 8.5   PHOS  --  6.6*  --    ALB  --  2.9*  --    TBILI  --  0.8  --    ALT  --  14  --    INR  --  1.8*  --        Signed: Lars Drew MD

## 2021-12-31 LAB
ANION GAP SERPL CALC-SCNC: 12 MMOL/L (ref 5–15)
BACTERIA SPEC CULT: NORMAL
BUN SERPL-MCNC: 39 MG/DL (ref 6–20)
BUN/CREAT SERPL: 8 (ref 12–20)
CALCIUM SERPL-MCNC: 8.7 MG/DL (ref 8.5–10.1)
CHLORIDE SERPL-SCNC: 97 MMOL/L (ref 97–108)
CO2 SERPL-SCNC: 25 MMOL/L (ref 21–32)
CREAT SERPL-MCNC: 5.02 MG/DL (ref 0.7–1.3)
GLUCOSE BLD STRIP.AUTO-MCNC: 126 MG/DL (ref 65–117)
GLUCOSE BLD STRIP.AUTO-MCNC: 188 MG/DL (ref 65–117)
GLUCOSE BLD STRIP.AUTO-MCNC: 249 MG/DL (ref 65–117)
GLUCOSE BLD STRIP.AUTO-MCNC: 308 MG/DL (ref 65–117)
GLUCOSE BLD STRIP.AUTO-MCNC: 65 MG/DL (ref 65–117)
GLUCOSE SERPL-MCNC: 41 MG/DL (ref 65–100)
POTASSIUM SERPL-SCNC: 3.4 MMOL/L (ref 3.5–5.1)
SERVICE CMNT-IMP: ABNORMAL
SERVICE CMNT-IMP: NORMAL
SERVICE CMNT-IMP: NORMAL
SODIUM SERPL-SCNC: 134 MMOL/L (ref 136–145)

## 2021-12-31 PROCEDURE — 74011250636 HC RX REV CODE- 250/636: Performed by: INTERNAL MEDICINE

## 2021-12-31 PROCEDURE — 74011000250 HC RX REV CODE- 250: Performed by: HOSPITALIST

## 2021-12-31 PROCEDURE — 80048 BASIC METABOLIC PNL TOTAL CA: CPT

## 2021-12-31 PROCEDURE — 74011000258 HC RX REV CODE- 258: Performed by: HOSPITALIST

## 2021-12-31 PROCEDURE — 74011250637 HC RX REV CODE- 250/637: Performed by: PSYCHIATRY & NEUROLOGY

## 2021-12-31 PROCEDURE — 74011250636 HC RX REV CODE- 250/636: Performed by: HOSPITALIST

## 2021-12-31 PROCEDURE — 74011636637 HC RX REV CODE- 636/637: Performed by: HOSPITALIST

## 2021-12-31 PROCEDURE — 65660000000 HC RM CCU STEPDOWN

## 2021-12-31 PROCEDURE — 82962 GLUCOSE BLOOD TEST: CPT

## 2021-12-31 PROCEDURE — 2709999900 HC NON-CHARGEABLE SUPPLY

## 2021-12-31 PROCEDURE — 74011250637 HC RX REV CODE- 250/637: Performed by: INTERNAL MEDICINE

## 2021-12-31 RX ADMIN — LACOSAMIDE 100 MG: 50 TABLET, FILM COATED ORAL at 09:46

## 2021-12-31 RX ADMIN — LEVETIRACETAM 500 MG: 500 TABLET, FILM COATED ORAL at 17:34

## 2021-12-31 RX ADMIN — Medication 4 UNITS: at 12:22

## 2021-12-31 RX ADMIN — THIAMINE HYDROCHLORIDE 250 MG: 100 INJECTION, SOLUTION INTRAMUSCULAR; INTRAVENOUS at 09:54

## 2021-12-31 RX ADMIN — BUMETANIDE 1 MG: 1 TABLET ORAL at 17:34

## 2021-12-31 RX ADMIN — HEPARIN SODIUM 1700 UNITS: 1000 INJECTION INTRAVENOUS; SUBCUTANEOUS at 05:10

## 2021-12-31 RX ADMIN — LACOSAMIDE 100 MG: 50 TABLET, FILM COATED ORAL at 22:00

## 2021-12-31 RX ADMIN — BUMETANIDE 1 MG: 1 TABLET ORAL at 09:46

## 2021-12-31 RX ADMIN — Medication 2 UNITS: at 17:33

## 2021-12-31 RX ADMIN — LEVETIRACETAM 500 MG: 500 TABLET, FILM COATED ORAL at 09:46

## 2021-12-31 RX ADMIN — DEXTROSE MONOHYDRATE 12.5 G: 25 INJECTION, SOLUTION INTRAVENOUS at 08:07

## 2021-12-31 RX ADMIN — LEVOFLOXACIN 500 MG: 5 INJECTION, SOLUTION INTRAVENOUS at 23:19

## 2021-12-31 NOTE — PROGRESS NOTES
0315-Pt no longer behavioral. Sleeping with regular respirations. Fall alarm engaged. Per supervisor pt sitter removed for higher acuity utilization at this time. 0630-Labs recollected STAT. Pt awake mild c/o pain from process. Not exhibiting any behaviors at this time. Zone 2 fall alarm engaged for safety.      Radha Gonzalez RN

## 2021-12-31 NOTE — PROGRESS NOTES
Received message from patient's nurse stating:    AMS, seizure, ESRD pt with unredirectable behaviors. Refusing tele, trying to drag himself to top of bed to get the cross that is mounted on the wall. pt pulling on bal. Pt can't comprehend its attached to him. Gown tied firmly to hide dialysis catheter (R) chest and IV access already wrapped. possible plan least restrictive measures first tele sitter and a PRN something to help him rest.         Discussion / orders:    Ativan 2 mg iv x 1           Please note that this note was dictated using Dragon computer voice recognition software. Quite often unanticipated grammatical, syntax, homophones, and other interpretive errors are inadvertently transcribed by the computer software. Please disregard these errors. Please excuse any errors that have escaped final proofreading.      Signed by:  Viveca Gowers, DNP, ACNP-BC

## 2021-12-31 NOTE — PROGRESS NOTES
Informed that it is ok to hold HD treatment on patient today r/t increased urine output and \"ongoing renal recovery\".

## 2021-12-31 NOTE — PROGRESS NOTES
Hospitalist Progress Note    NAME: Sujey Carballo   :  1965   MRN:  [id number]       Assessment / Plan:  Acute metabolic encephalopathy  Status epilepticus  History of seizure disorder  Aspiration pneumonia  MRI brainno acute abnormality. CT headno acute abnormality. Lumbar puncture doneCSF reveals no infectious etiology so far. CSF culture negative. CSF HSV negative so we will discontinue the acyclovir. CSF meningitis panel negative. Out side hospital EEG indication triphasic slowing and focal 3hz right fronto temproal field siezure activety. EEG done here shows no seizure. On continuous EEGno more seizure reported on the continuous EEG. Neurology on boardappreciate input. Continue Keppra along with Vimpat. Seizure precaution. Chest x-rayno acute pathology.  -Continue Levaquin, will discontinue after 2 more doses. Afebrile for the last 2 days. WBC stable, procalcitonin 0.47. Initial blood culture at the other hospital negative, repeat blood culture negative so far. Patient much more awake but still confused. Consulted psychiatry. .    End-stage renal disease on dialysis  Nephrology was consultedhemodialysis per nephrology. Dialysis daysMonday, Wednesday and Friday. On Bumex 1 mg twice daily by the nephrology. Making good amount of urine output. Hypertension  Diabetes type 2  Hyperlipidemia  Polysubstance abuse  Sliding scale insulin along with Lantus. Body mass index is 20.58 kg/m². Estimated discharge date:   Barriers:    Code status: Full  Prophylaxis: Lovenox  Recommended Disposition:      Subjective:     Chief Complaint / Reason for Physician Visit  Patient seen today, resting comfortably on the bed eating, awake but not alert. . Knows the name but not the place and the birth date. Objective:     VITALS:   Last 24hrs VS reviewed since prior progress note.  Most recent are:  Patient Vitals for the past 24 hrs:   Temp Pulse Resp BP SpO2   12/31/21 1201 98.1 °F (36.7 °C) 91 16 (!) 141/88 95 %   12/31/21 0817 97.6 °F (36.4 °C) 77 16 134/84 98 %   12/31/21 0302 97.4 °F (36.3 °C) 74 14 128/77 97 %   12/31/21 0100 97.5 °F (36.4 °C) 76 16 130/82 100 %   12/30/21 1957 98.3 °F (36.8 °C) 76 18 (!) 146/80 96 %   12/30/21 1629 98.3 °F (36.8 °C) 66 18 (!) 160/92 96 %       Intake/Output Summary (Last 24 hours) at 12/31/2021 1336  Last data filed at 12/31/2021 0302  Gross per 24 hour   Intake    Output 1800 ml   Net -1800 ml        I had a face to face encounter and independently examined this patient on 12/31/2021, as outlined below:  PHYSICAL EXAM:  General: WD, WN. Awake, not alert, cooperative, no acute distress    EENT:  EOMI. Anicteric sclerae. MMM  Resp:  CTA bilaterally, no wheezing or rales. No accessory muscle use  CV:  Regular  rhythm,  No edema  GI:  Soft, Non distended, Non tender. +Bowel sounds  Neurologic:  Awake but confused, normal speech,   Psych:   Good insight. Not anxious nor agitated  Skin:  No rashes. No jaundice    Reviewed most current lab test results and cultures  YES  Reviewed most current radiology test results   YES  Review and summation of old records today    NO  Reviewed patient's current orders and MAR    YES  PMH/SH reviewed - no change compared to H&P  ________________________________________________________________________  Care Plan discussed with:    Comments   Patient x    Family      RN x    Care Manager     Consultant                       x Multidiciplinary team rounds were held today with , nursing, pharmacist and clinical coordinator. Patient's plan of care was discussed; medications were reviewed and discharge planning was addressed.      ________________________________________________________________________  Total NON critical care TIME:  38  Minutes    Total CRITICAL CARE TIME Spent:   Minutes non procedure based      Comments   >50% of visit spent in counseling and coordination of care     ________________________________________________________________________  Cristela Sanchez MD     Procedures: see electronic medical records for all procedures/Xrays and details which were not copied into this note but were reviewed prior to creation of Plan. LABS:  I reviewed today's most current labs and imaging studies.   Pertinent labs include:  Recent Labs     12/30/21 0159 12/29/21 0140   WBC 9.8 10.8   HGB 10.1* 10.1*   HCT 30.9* 31.7*    196     Recent Labs     12/31/21  0643 12/30/21 0159 12/29/21 0140   * 142 137   K 3.4* 3.4* 4.1   CL 97 106 100   CO2 25 28 20*   GLU 41* 133* 359*   BUN 39* 30* 62*   CREA 5.02* 3.80* 6.25*   CA 8.7 8.7 9.1   PHOS  --   --  6.6*   ALB  --   --  2.9*   TBILI  --   --  0.8   ALT  --   --  14   INR  --   --  1.8*       Signed: Cristela Sanchez MD

## 2021-12-31 NOTE — PROGRESS NOTES
of Shift Note     Bedside shift change report given to Joni Cole, RN (oncoming nurse) by Powell Krabbe, GERARDO (offgoing nurse).   Report included the following information SBAR, Kardex and ED Summary     Shift worked:  7am-7pm   Shift summary and any significant changes:     No agitation         Concerns for physician to address:  none   Zone phone for oncoming shift:   1514      Patient Information  Sydney Members [address]  49 y.o.  12/28/2021 11:05 PM by James Means MD. Munir Victor admitted     Problem List          Patient Active Problem List     Diagnosis Date Noted    Status epilepticus ([address] 12/28/2021    Aspiration pneumonia ([address] 12/28/2021    ESRD needing dialysis ([address] 12/28/2021    AMS (altered mental status) 12/28/2021    ATN (acute tubular necrosis) ([address] 12/25/2021    Interstitial nephritis determined by biopsy 12/25/2021    Thrombocytopenia ([address] 12/25/2021    Hypertension 12/25/2021    Sepsis ([address] 12/25/2021    Acute metabolic encephalopathy 92/94/4752    Hypoglycemia 11/25/2021    EUSEBIA (acute kidney injury) ([address] 02/57/9839    Metabolic acidemia 48/96/1653    Intractable nausea and vomiting 11/25/2021    Hyperglycemia due to type 2 diabetes mellitus ([address] 05/05/2019    Hyperosmolar non-ketotic state in patient with type 2 diabetes mellitus ([address] 12/16/2018              Past Medical History:   Diagnosis Date    Asthma      Diabetes ([address]      High cholesterol      Seizure (HCC)           Activity:  Activity Level: Bed Rest  Number times ambulated in hallways past shift: 0  Number of times OOB to chair past shift: 0     Cardiac:   Cardiac Monitoring: no     Cardiac Rhythm: no     Access:   Current line(s): PIV and HD access   Central Line? Yes Placement date [facility] Medically Necessary Hemodialysis  PICC LINE? No Placement date Reason Medically Necessary      Genitourinary:   Urinary status: incontinent  Urinary Catheter? Yes Placement Date 12/24 Reason Medically Necessary retention     Respiratory:   O2 Device: None (Room air)  Chronic home O2 use?: NO  Incentive spirometer at bedside: NO     GI:  Current diet:  DIET regular  Passing flatus: YES  Tolerating current diet: yes     Pain Management:   Patient states pain is manageable on current regimen: N/A, nonverbal     Skin:  Loco Score: 14  Interventions: float heels, internal/external urinary devices and nutritional support     Patient Safety:  Fall Score: Total Score: 3  Interventions: bed/chair alarm  High Fall Risk: Yes  @Rollbelt  @dexterity to release roll belt  Yes/No ( must document dexterity  here by stating Yes or No here, otherwise this is a restraint and must follow restraint documentation policy.)     DVT prophylaxis:  DVT prophylaxis Med- Yes  DVT prophylaxis SCD or ESVIN- No      Wounds: (If Applicable)  Wounds- No  Location      Active Consults:  IP CONSULT TO NEUROLOGY  IP CONSULT TO NEPHROLOGY     Length of Stay:  Expected LOS: - - -  Actual LOS: 2  Discharge Plan: No TBD        Kris Smith RN

## 2021-12-31 NOTE — PROGRESS NOTES
2117-Pt pulled off tele leads, curled up at the top of the bed fetal position. When ask what he is doing? Pt response \"trying to get the cross\". \"I like crosses\". Pt advised that is the property of the hospital and mounted to the wall. Pt assisted with laying properly in bed but he keeps pulling on his bal. Attempts to redirect the behavior or cover the line to distract patient unsuccessful. 2127-Pt leaning over the bed moaning straining. I asked \"What are you trying to do? \" Pt responded \"I'm trying to get up. Bed alarm engaged. Pt refusing tele. On call provider notification in progress.      Dale Mendez RN

## 2021-12-31 NOTE — PROGRESS NOTES
7:56 blood glucose result that was drawn  this am was called to me by the lab and its is 41mg /dl  8:00 accu check was done and it is  65mg/dl,pt.is drowsy from Ativan,Dextrose 25 mg was given.

## 2021-12-31 NOTE — PROGRESS NOTES
Transition of Care Plan:    RUR: 18%  Disposition: TBD   Follow up appointments: PCP, specialists as indicate d  DME needed: TBD   Transportation at Discharge: TBD   Keys or means to access home:  N/A    IM Medicare Letter: N/A, had Medicaid   Is patient a BCPI-A Bundle: No        If yes, was Bundle Letter given?:    Is patient a  and connected with the [place]? Unknown at this time   If yes, was [place] transfer form completed and [facility] notified? Caregiver Contact: TBD   Discharge Caregiver contacted prior to discharge? Chart reviewed. Informed that pt has been released from [facility] during this hospitalization. CM continuing to follow and seeking guidance from leadership team. May have to conduct a family search early next week to assist with discharge planning. Pt is on HD and will need to be assigned an OP HD chair prior to d/c. Will continue to follow.     ALESSANDRO Hassan   Care Manager, [id number] [facility]  [phone]
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
## 2021-12-31 NOTE — PROGRESS NOTES
End of Shift Note     Bedside shift change report given to Daryl Nielsen RN (oncoming nurse) by Jie Chang RN (offgoing nurse). Report included the following information SBAR, Kardex and ED Summary     Shift worked:  days   Shift summary and any significant changes:      EEG done today. Pt more aware today than yesterday. He was able to answer some simple questions, but is still very confused. Speech consult done, patient tolerating his diet well. Pt tried to get out of bed several times today. Easily redirected. Unable to contact pt family or friends. The number on record will not connect. The assisted he came from did not have any other contact information. They would like us to call them if we find out any family contact information.  Blanche Gonzalez can be reached at  [phone] or [phone].         Concerns for physician to address:     Zone phone for oncoming shift:   [phone]      Patient Information  Beba Gaster [address]  49 y.o.  12/28/2021 11:05 PM by Sonja Biggs MD. Felicitas George was admitted     Problem List       Patient Active Problem List     Diagnosis Date Noted    Status epilepticus ([address] 12/28/2021    Aspiration pneumonia ([address] 12/28/2021    ESRD needing dialysis ([address] 12/28/2021    AMS (altered mental status) 12/28/2021    ATN (acute tubular necrosis) ([address] 12/25/2021    Interstitial nephritis determined by biopsy 12/25/2021    Thrombocytopenia ([address] 12/25/2021    Hypertension 12/25/2021    Sepsis ([address] 12/25/2021    Acute metabolic encephalopathy 60/49/4848    Hypoglycemia 11/25/2021    EUSEBIA (acute kidney injury) ([address] 22/99/7509    Metabolic acidemia 86/71/7690    Intractable nausea and vomiting 11/25/2021    Hyperglycemia due to type 2 diabetes mellitus ([address] 05/05/2019    Hyperosmolar non-ketotic state in patient with type 2 diabetes mellitus ([address] 12/16/2018           Past Medical History:   Diagnosis Date    Asthma      Diabetes ([address]      High cholesterol      Seizure ([facility]           Activity:  Activity Level: Bed Rest  Number times ambulated in hallways past shift: 0  Number of times OOB to chair past shift: 0     Cardiac:   Cardiac Monitoring: Yes      Cardiac Rhythm: Sinus Rhythm     Access:   Current line(s): PIV and HD access   Central Line? Yes Placement date  Reason Medically Necessary Hemodialysis  PICC LINE? No Placement date Reason Medically Necessary      Genitourinary:   Urinary status: incontinent  Urinary Catheter? Yes Placement Date 12/24 Reason Medically Necessary retention     Respiratory:   O2 Device: None (Room air)  Chronic home O2 use?: NO  Incentive spirometer at bedside: NO     GI:  Current diet:  DIET NPO  Passing flatus: YES  Tolerating current diet: NO NPO     Pain Management:   Patient states pain is manageable on current regimen: N/A, nonverbal     Skin:  Loco Score: 14  Interventions: float heels, internal/external urinary devices and nutritional support     Patient Safety:  Fall Score:  Total Score: 3  Interventions: bed/chair alarm  High Fall Risk: Yes  @Rollbelt  @dexterity to release roll belt  Yes/No ( must document dexterity  here by stating Yes or No here, otherwise this is a restraint and must follow restraint documentation policy.)     DVT prophylaxis:  DVT prophylaxis Med- Yes  DVT prophylaxis SCD or ESVIN- No      Wounds: (If Applicable)  Wounds- No  Location      Active Consults:  IP CONSULT TO NEUROLOGY  IP CONSULT TO NEPHROLOGY     Length of Stay:  Expected LOS: - - -  Actual LOS: 2  Discharge Plan: No TBD        Krzysztof Martines RN

## 2021-12-31 NOTE — CONSULTS
Called unit x 2, unable to reach anyone. Called back unit who placed order (different unit) and left my cell number so she can give it to RN caring for pt. Available this afternoon for psych consult.

## 2021-12-31 NOTE — PROGRESS NOTES
Nephrology Progress Note  Dorian Antonio     [facility]. [facility]  Phone - [phone]   Patient: Nellie Phipps    YOB: 1965        Date- 12/31/2021   Admit Date: 12/28/2021  CC: Follow up for EUSEBIA        IMPRESSION & PLAN:    EUSEBIA on dialysis Monday Wednesday Friday with [place] [facility]   Metabolic encephalopathy   History of hypoglycemia   Interstitial nephritis determined by biopsy NSAID induced   Hypertension   Sepsis   Anemia    PLAN-   We will keep him on MWF dialysis schedule and dialyze him tomorrow.  Hemoglobin stable no need for EPO   Made almost 900 mL of urine overnight, and 600 mL already in the Garcia bag since this morning.  Suspect ongoing renal recovery. Still poor clearance so we will continue to monitor for dialysis needs   Continue with blood pressure medications   Continue Bumex at present dose   Ongoing work-up for metabolic encephalopathy and seizures per neurology.  Thank you for the consult, will follow with you     Subjective: Interval History:   -Patient was seen and examined   -Awake but non communicative.  -Excellent urine output    Objective:   Vitals:    12/31/21 0100 12/31/21 0302 12/31/21 0634 12/31/21 0817   BP: 130/82 128/77  134/84   Pulse: 76 74  77   Resp: 16 14  16   Temp: 97.5 °F (36.4 °C) 97.4 °F (36.3 °C)  97.6 °F (36.4 °C)   TempSrc:       SpO2: 100% 97%  98%   Weight:   51 kg (112 lb 8 oz)       12/30 0701 - 12/31 0700  In: -   Out: 1800 [Urine:1800]  Last 3 Recorded Weights in this Encounter    12/31/21 0634   Weight: 51 kg (112 lb 8 oz)      Physical exam:   GEN: Altered mental status, cachectic  NECK- Supple, no mass  RESP: No wheezing, Clear b/l  CVS: S1,S2  RRR  NEURO: Altered mental status  EXT: No Edema       Chart reviewed. Pertinent Notes reviewed.      Data Review :  Recent Labs     12/31/21  0643 12/30/21  0159 12/29/21  0140   * 142 137   K 3.4* 3.4* 4.1   CL 97 106 100   CO2 25 28 20*   BUN 39* 30* 62*   CREA 5.02* 3.80* 6.25*   GLU 41* 133* 359*   CA 8.7 8.7 9.1   PHOS  --   --  6.6*     Recent Labs     12/30/21  0159 12/29/21  0140   WBC 9.8 10.8   HGB 10.1* 10.1*   HCT 30.9* 31.7*    196     No results for input(s): FE, TIBC, PSAT, FERR in the last 72 hours.    Medication list  reviewed  Current Facility-Administered Medications   Medication Dose Route Frequency    heparin (porcine) 1,000 unit/mL injection 1,700 Units  1,700 Units InterCATHeter DIALYSIS PRN    And    heparin (porcine) 1,000 unit/mL injection 1,600 Units  1,600 Units InterCATHeter DIALYSIS PRN    bumetanide (BUMEX) tablet 1 mg  1 mg Oral BID    levETIRAcetam (KEPPRA) tablet 500 mg  500 mg Oral BID    lacosamide (VIMPAT) tablet 100 mg  100 mg Oral Q12H    insulin glargine (LANTUS) injection 15 Units  15 Units SubCUTAneous DAILY    levoFLOXacin (LEVAQUIN) 500 mg in D5W IVPB  500 mg IntraVENous Q48H    thiamine (B-1) 250 mg in 0.9% sodium chloride 50 mL IVPB  250 mg IntraVENous DAILY    hydrALAZINE (APRESOLINE) 20 mg/mL injection 10 mg  10 mg IntraVENous Q6H PRN    ondansetron (ZOFRAN ODT) tablet 4 mg  4 mg Oral Q8H PRN    Or    ondansetron (ZOFRAN) injection 4 mg  4 mg IntraVENous Q6H PRN    acetaminophen (TYLENOL) tablet 650 mg  650 mg Oral Q4H PRN    Or    acetaminophen (TYLENOL) solution 650 mg  650 mg Per NG tube Q4H PRN    Or    acetaminophen (TYLENOL) suppository 650 mg  650 mg Rectal Q4H PRN    insulin lispro (HUMALOG) injection   SubCUTAneous AC&HS    glucose chewable tablet 16 g  4 Tablet Oral PRN    dextrose (D50W) injection syrg 12.5-25 g  12.5-25 g IntraVENous PRN    glucagon (GLUCAGEN) injection 1 mg  1 mg IntraMUSCular PRN          Chaka Kidd MD              [facility]  [facility] / [facility], [address]  [place], [address]  Phone - [phone]               Fax - [phone]

## 2022-01-01 LAB
ALBUMIN SERPL-MCNC: 2.6 G/DL (ref 3.5–5)
ANION GAP SERPL CALC-SCNC: 11 MMOL/L (ref 5–15)
BACTERIA SPEC CULT: NORMAL
BASOPHILS # BLD: 0 K/UL (ref 0–0.1)
BASOPHILS NFR BLD: 0 % (ref 0–1)
BUN SERPL-MCNC: 48 MG/DL (ref 6–20)
BUN/CREAT SERPL: 8 (ref 12–20)
CALCIUM SERPL-MCNC: 8.7 MG/DL (ref 8.5–10.1)
CHLORIDE SERPL-SCNC: 90 MMOL/L (ref 97–108)
CO2 SERPL-SCNC: 27 MMOL/L (ref 21–32)
CREAT SERPL-MCNC: 5.81 MG/DL (ref 0.7–1.3)
DIFFERENTIAL METHOD BLD: ABNORMAL
EOSINOPHIL # BLD: 0.2 K/UL (ref 0–0.4)
EOSINOPHIL NFR BLD: 2 % (ref 0–7)
ERYTHROCYTE [DISTWIDTH] IN BLOOD BY AUTOMATED COUNT: 14.3 % (ref 11.5–14.5)
GLUCOSE BLD STRIP.AUTO-MCNC: 130 MG/DL (ref 65–117)
GLUCOSE BLD STRIP.AUTO-MCNC: 193 MG/DL (ref 65–117)
GLUCOSE BLD STRIP.AUTO-MCNC: 298 MG/DL (ref 65–117)
GLUCOSE BLD STRIP.AUTO-MCNC: 483 MG/DL (ref 65–117)
GLUCOSE SERPL-MCNC: 313 MG/DL (ref 65–100)
GRAM STN SPEC: NORMAL
GRAM STN SPEC: NORMAL
HCT VFR BLD AUTO: 30.8 % (ref 36.6–50.3)
HGB BLD-MCNC: 10.1 G/DL (ref 12.1–17)
IMM GRANULOCYTES # BLD AUTO: 0 K/UL (ref 0–0.04)
IMM GRANULOCYTES NFR BLD AUTO: 0 % (ref 0–0.5)
LYMPHOCYTES # BLD: 0.9 K/UL (ref 0.8–3.5)
LYMPHOCYTES NFR BLD: 10 % (ref 12–49)
MCH RBC QN AUTO: 30.8 PG (ref 26–34)
MCHC RBC AUTO-ENTMCNC: 32.8 G/DL (ref 30–36.5)
MCV RBC AUTO: 93.9 FL (ref 80–99)
MONOCYTES # BLD: 0.7 K/UL (ref 0–1)
MONOCYTES NFR BLD: 7 % (ref 5–13)
NEUTS SEG # BLD: 7.5 K/UL (ref 1.8–8)
NEUTS SEG NFR BLD: 81 % (ref 32–75)
NRBC # BLD: 0 K/UL (ref 0–0.01)
NRBC BLD-RTO: 0 PER 100 WBC
PHOSPHATE SERPL-MCNC: 4.8 MG/DL (ref 2.6–4.7)
PLATELET # BLD AUTO: 265 K/UL (ref 150–400)
PMV BLD AUTO: 11 FL (ref 8.9–12.9)
POTASSIUM SERPL-SCNC: 3.4 MMOL/L (ref 3.5–5.1)
RBC # BLD AUTO: 3.28 M/UL (ref 4.1–5.7)
SERVICE CMNT-IMP: ABNORMAL
SERVICE CMNT-IMP: NORMAL
SODIUM SERPL-SCNC: 128 MMOL/L (ref 136–145)
WBC # BLD AUTO: 9.3 K/UL (ref 4.1–11.1)

## 2022-01-01 PROCEDURE — 74011636637 HC RX REV CODE- 636/637: Performed by: HOSPITALIST

## 2022-01-01 PROCEDURE — 36415 COLL VENOUS BLD VENIPUNCTURE: CPT

## 2022-01-01 PROCEDURE — 74011250636 HC RX REV CODE- 250/636: Performed by: HOSPITALIST

## 2022-01-01 PROCEDURE — 65660000000 HC RM CCU STEPDOWN

## 2022-01-01 PROCEDURE — 82962 GLUCOSE BLOOD TEST: CPT

## 2022-01-01 PROCEDURE — 90935 HEMODIALYSIS ONE EVALUATION: CPT

## 2022-01-01 PROCEDURE — 80069 RENAL FUNCTION PANEL: CPT

## 2022-01-01 PROCEDURE — 74011250637 HC RX REV CODE- 250/637: Performed by: HOSPITALIST

## 2022-01-01 PROCEDURE — 85025 COMPLETE CBC W/AUTO DIFF WBC: CPT

## 2022-01-01 PROCEDURE — 74011250637 HC RX REV CODE- 250/637: Performed by: PSYCHIATRY & NEUROLOGY

## 2022-01-01 PROCEDURE — 74011000258 HC RX REV CODE- 258: Performed by: HOSPITALIST

## 2022-01-01 PROCEDURE — 74011636637 HC RX REV CODE- 636/637: Performed by: STUDENT IN AN ORGANIZED HEALTH CARE EDUCATION/TRAINING PROGRAM

## 2022-01-01 PROCEDURE — 74011250636 HC RX REV CODE- 250/636: Performed by: INTERNAL MEDICINE

## 2022-01-01 PROCEDURE — 74011250637 HC RX REV CODE- 250/637: Performed by: INTERNAL MEDICINE

## 2022-01-01 RX ADMIN — HEPARIN SODIUM 1600 UNITS: 1000 INJECTION INTRAVENOUS; SUBCUTANEOUS at 11:45

## 2022-01-01 RX ADMIN — LEVETIRACETAM 500 MG: 500 TABLET, FILM COATED ORAL at 13:53

## 2022-01-01 RX ADMIN — LACOSAMIDE 100 MG: 50 TABLET, FILM COATED ORAL at 13:53

## 2022-01-01 RX ADMIN — HEPARIN SODIUM 1700 UNITS: 1000 INJECTION INTRAVENOUS; SUBCUTANEOUS at 11:45

## 2022-01-01 RX ADMIN — Medication 10 UNITS: at 17:41

## 2022-01-01 RX ADMIN — ACETAMINOPHEN 650 MG: 325 TABLET ORAL at 00:07

## 2022-01-01 RX ADMIN — Medication 15 UNITS: at 13:57

## 2022-01-01 RX ADMIN — BUMETANIDE 1 MG: 1 TABLET ORAL at 17:46

## 2022-01-01 RX ADMIN — THIAMINE HYDROCHLORIDE 250 MG: 100 INJECTION, SOLUTION INTRAMUSCULAR; INTRAVENOUS at 13:55

## 2022-01-01 NOTE — PROGRESS NOTES
End of Shift Note    Bedside shift change report given to [facility] (oncoming nurse) by VERONICA Cee (offgoing nurse). Report included the following information SBAR    Shift worked:  [id number]   Shift summary and any significant changes:     PRN Tylenol x1       Concerns for physician to address:  None   Zone phone for oncoming shift:   [phone]     Patient Information  Adan Pierre [address]  49 y.o.  12/28/2021 11:05 PM by Nathalie Holguin MD. Navdeep Castaneda was admitted from long-term     Problem List  Patient Active Problem List    Diagnosis Date Noted    Status epilepticus ([address] 12/28/2021    Aspiration pneumonia ([address] 12/28/2021    ESRD needing dialysis ([address] 12/28/2021    AMS (altered mental status) 12/28/2021    ATN (acute tubular necrosis) ([address] 12/25/2021    Interstitial nephritis determined by biopsy 12/25/2021    Thrombocytopenia ([address] 12/25/2021    Hypertension 12/25/2021    Sepsis ([address] 12/25/2021    Acute metabolic encephalopathy 89/04/0013    Hypoglycemia 11/25/2021    EUSEBIA (acute kidney injury) ([address] 94/63/4934    Metabolic acidemia 99/76/1228    Intractable nausea and vomiting 11/25/2021    Hyperglycemia due to type 2 diabetes mellitus ([address] 05/05/2019    Hyperosmolar non-ketotic state in patient with type 2 diabetes mellitus ([address] 12/16/2018     Past Medical History:   Diagnosis Date    Asthma     Diabetes ([address]     High cholesterol     Seizure ([address]        Core Measures:  CVA: No No  CHF:No No  PNA:No No    Activity:  Activity Level: Up with Assistance  Number times ambulated in hallways past shift: 0  Number of times OOB to chair past shift: 0    Cardiac:   Cardiac Monitoring: No      Cardiac Rhythm: Sinus Rhythm    Access:   Current line(s): PIV and HD access     Genitourinary:   Urinary status: bal  Urinary Catheter?  Yes Placement Date 12/28/2021 Reason Medically Necessary retention    Respiratory:   O2 Device: None (Room air)  Chronic home O2 use?: N/A  Incentive spirometer at bedside: N/A       GI:  Last Bowel Movement Date:  (unknown)  Current diet:  ADULT DIET Dysphagia - Soft & Bite Sized  Passing flatus: YES  Tolerating current diet: YES       Pain Management:   Patient states pain is manageable on current regimen: N/A    Skin:  Loco Score: 16  Interventions: increase time out of bed, limit briefs and internal/external urinary devices    Patient Safety:  Fall Score:  Total Score: 3  Interventions: bed/chair alarm, gripper socks and pt to call before getting OOB  High Fall Risk: Yes  @Rollbelt  @dexterity to release roll belt  Yes/No ( must document dexterity  here by stating Yes or No here, otherwise this is a restraint and must follow restraint documentation policy.)    DVT prophylaxis:  DVT prophylaxis Med- No  DVT prophylaxis SCD or ESVIN- No     Wounds: (If Applicable)  Wounds- No  Location     Active Consults:  IP CONSULT TO NEUROLOGY  IP CONSULT TO NEPHROLOGY  IP CONSULT TO PSYCHIATRY    Length of Stay:  Expected LOS: - - -  Actual LOS: 4  Discharge Plan: No; TBVERONICA Man

## 2022-01-01 NOTE — PROGRESS NOTES
Nephrology Progress Note  Dorian Antonio     www. [facility]  Phone - [phone]   Patient: Marty Bowie    YOB: 1965        Date- 1/1/2022   Admit Date: 12/28/2021  CC: Follow up for EUSEBIA        IMPRESSION & PLAN:    EUSEBIA  Was on dialysis MWF with [place] [facility]   Metabolic encephalopathy   History of hypoglycemia   Interstitial nephritis determined by biopsy NSAID induced   Hypertension   Sepsis   Anemia    PLAN-   We will switch him to TTS for hemodialysis.  Plan for dialysis today.  We will keep him on dialysis tentatively for Tuesday, I suspect he is undergoing renal recovery as he might not need it.  Urine output is 2.7 L but still very poor clearance.  Hemoglobin stable no need for EPO   Continue with blood pressure medications   Continue Bumex at present dose   Ongoing work-up for metabolic encephalopathy and seizures per neurology. On HD     Subjective: Interval History:   -Patient was seen and examined   -Awake but non communicative.  -Excellent urine output 2.7 L    Objective:   Vitals:    01/01/22 0845 01/01/22 0900 01/01/22 0915 01/01/22 0930   BP: (!) 154/97 (!) 149/94 (!) 143/92 (!) 137/95   Pulse: 85 80 84 84   Resp: 18 18 18 18   Temp:       TempSrc:       SpO2:       Weight:          12/31 0701 - 01/01 0700  In: 80 [P.O.:780]  Out: 2700 [Urine:2700]  Last 3 Recorded Weights in this Encounter    12/31/21 0634   Weight: 51 kg (112 lb 8 oz)      Physical exam:   GEN: Altered mental status, cachectic  NECK- Supple, no mass  RESP: No wheezing, Clear b/l  CVS: S1,S2  RRR  NEURO: Altered mental status  EXT: No Edema       Chart reviewed. Pertinent Notes reviewed.      Data Review :  Recent Labs     01/01/22  0740 12/31/21  0643 12/30/21  0159   * 134* 142   K 3.4* 3.4* 3.4*   CL 90* 97 106   CO2 27 25 28   BUN 48* 39* 30*   CREA 5.81* 5.02* 3.80*   * 41* 133*   CA 8.7 8.7 8.7   PHOS 4.8*  --   -- Recent Labs     01/01/22  0219 12/30/21  0159   WBC 9.3 9.8   HGB 10.1* 10.1*   HCT 30.8* 30.9*    201     No results for input(s): FE, TIBC, PSAT, FERR in the last 72 hours.    Medication list  reviewed  Current Facility-Administered Medications   Medication Dose Route Frequency    heparin (porcine) 1,000 unit/mL injection 1,700 Units  1,700 Units InterCATHeter DIALYSIS PRN    And    heparin (porcine) 1,000 unit/mL injection 1,600 Units  1,600 Units InterCATHeter DIALYSIS PRN    bumetanide (BUMEX) tablet 1 mg  1 mg Oral BID    levETIRAcetam (KEPPRA) tablet 500 mg  500 mg Oral BID    lacosamide (VIMPAT) tablet 100 mg  100 mg Oral Q12H    insulin glargine (LANTUS) injection 15 Units  15 Units SubCUTAneous DAILY    levoFLOXacin (LEVAQUIN) 500 mg in D5W IVPB  500 mg IntraVENous Q48H    thiamine (B-1) 250 mg in 0.9% sodium chloride 50 mL IVPB  250 mg IntraVENous DAILY    hydrALAZINE (APRESOLINE) 20 mg/mL injection 10 mg  10 mg IntraVENous Q6H PRN    ondansetron (ZOFRAN ODT) tablet 4 mg  4 mg Oral Q8H PRN    Or    ondansetron (ZOFRAN) injection 4 mg  4 mg IntraVENous Q6H PRN    acetaminophen (TYLENOL) tablet 650 mg  650 mg Oral Q4H PRN    Or    acetaminophen (TYLENOL) solution 650 mg  650 mg Per NG tube Q4H PRN    Or    acetaminophen (TYLENOL) suppository 650 mg  650 mg Rectal Q4H PRN    insulin lispro (HUMALOG) injection   SubCUTAneous AC&HS    glucose chewable tablet 16 g  4 Tablet Oral PRN    dextrose (D50W) injection syrg 12.5-25 g  12.5-25 g IntraVENous PRN    glucagon (GLUCAGEN) injection 1 mg  1 mg IntraMUSCular PRN          Teri Dejesus MD              [facility]  [facility] / [facility], [address]  [place], [address]  Phone - [phone]               Fax - [phone]

## 2022-01-01 NOTE — PROGRESS NOTES
Hospitalist Progress Note    NAME: Ventura Irizarry   :  1965   MRN:  [id number]       Assessment / Plan:  Acute metabolic encephalopathy  Status epilepticus  History of seizure disorder  Aspiration pneumonia  MRI brainno acute abnormality. CT headno acute abnormality. Lumbar puncture doneCSF reveals no infectious etiology so far. CSF culture negative. CSF HSV negative so we will discontinue the acyclovir. CSF meningitis panel negative. Out side hospital EEG indication triphasic slowing and focal 3hz right fronto temproal field siezure activety. EEG done here shows no seizure. On continuous EEGno more seizure reported on the continuous EEG. Neurology on boardappreciate input. Continue Keppra along with Vimpat. Seizure precaution. Chest x-rayno acute pathology.  -Continue Levaquin, will discontinue after 2 more doses. Afebrile for the last 2 days. WBC stable, procalcitonin 0.47. Initial blood culture at the other hospital negative, repeat blood culture negative. Mental status very much the sameawake but confused  Consulted psychiatryrecommend Haldol as needed. End-stage renal disease on dialysis  Nephrology was consultedhemodialysis per nephrology. Dialysis days changed today to Tuesday, Thursday and Saturday  On Bumex 1 mg twice daily by the nephrology. Making good amount of urine output. Creatinine 5.81, no significant improvement      Hypertension  Diabetes type 2  Hyperlipidemia  Polysubstance abuse  Sliding scale insulin along with Lantus. Body mass index is 20.58 kg/m². Estimated discharge date:   Barriers:    Code status: Full  Prophylaxis: Lovenox  Recommended Disposition:      Subjective:     Chief Complaint / Reason for Physician Visit  Patient seen and examined today, awake but confused. Objective:     VITALS:   Last 24hrs VS reviewed since prior progress note.  Most recent are:  Patient Vitals for the past 24 hrs: Temp Pulse Resp BP SpO2   01/01/22 1115  93 18 135/84    01/01/22 1100  98 18 (!) 118/93    01/01/22 1045  92 18 127/81    01/01/22 1030  94 18 132/84    01/01/22 1015  87 18 (!) 128/90    01/01/22 1000  92 18 111/78    01/01/22 0945  98 18 (!) 159/101    01/01/22 0930  84 18 (!) 137/95    01/01/22 0915  84 18 (!) 143/92    01/01/22 0900  80 18 (!) 149/94    01/01/22 0845  85 18 (!) 154/97    01/01/22 0830  81 18 (!) 144/91    01/01/22 0813 98 °F (36.7 °C) 85 18 (!) 129/95    01/01/22 0800 97.3 °F (36.3 °C) 79 18 (!) 151/98 96 %   01/01/22 0302 98.4 °F (36.9 °C) 84 16 (!) 148/93 95 %   12/31/21 2325 98.8 °F (37.1 °C) 86 20 (!) 149/97 97 %   12/31/21 1958 98.5 °F (36.9 °C) 78 16 139/89 96 %   12/31/21 1559 97.8 °F (36.6 °C) 84 18 (!) 136/91 95 %   12/31/21 1201 98.1 °F (36.7 °C) 91 16 (!) 141/88 95 %       Intake/Output Summary (Last 24 hours) at 1/1/2022 1125  Last data filed at 1/1/2022 0800  Gross per 24 hour   Intake 780 ml   Output 3650 ml   Net -2870 ml        I had a face to face encounter and independently examined this patient on 1/1/2022, as outlined below:  PHYSICAL EXAM:  General: WD, WN. Awake, not alert, cooperative, no acute distress    EENT:  EOMI. Anicteric sclerae. MMM  Resp:  CTA bilaterally, no wheezing or rales. No accessory muscle use  CV:  Regular  rhythm,  No edema  GI:  Soft, Non distended, Non tender. +Bowel sounds  Neurologic:  Awake but confused, normal speech,   Psych:   Good insight. Not anxious nor agitated  Skin:  No rashes.   No jaundice    Reviewed most current lab test results and cultures  YES  Reviewed most current radiology test results   YES  Review and summation of old records today    NO  Reviewed patient's current orders and MAR    YES  PMH/SH reviewed - no change compared to H&P  ________________________________________________________________________  Care Plan discussed with:    Comments   Patient x    Family      RN x    Care Manager Consultant  x  nephrology                     Multidiciplinary team rounds were held today with , nursing, pharmacist and clinical coordinator. Patient's plan of care was discussed; medications were reviewed and discharge planning was addressed. ________________________________________________________________________  Total NON critical care TIME:  38  Minutes    Total CRITICAL CARE TIME Spent:   Minutes non procedure based      Comments   >50% of visit spent in counseling and coordination of care     ________________________________________________________________________  Scot Aguilar MD     Procedures: see electronic medical records for all procedures/Xrays and details which were not copied into this note but were reviewed prior to creation of Plan. LABS:  I reviewed today's most current labs and imaging studies.   Pertinent labs include:  Recent Labs     01/01/22  0219 12/30/21  0159   WBC 9.3 9.8   HGB 10.1* 10.1*   HCT 30.8* 30.9*    201     Recent Labs     01/01/22  0740 12/31/21  0643 12/30/21  0159   * 134* 142   K 3.4* 3.4* 3.4*   CL 90* 97 106   CO2 27 25 28   * 41* 133*   BUN 48* 39* 30*   CREA 5.81* 5.02* 3.80*   CA 8.7 8.7 8.7   PHOS 4.8*  --   --    ALB 2.6*  --   --        Signed: Scot Aguilar MD

## 2022-01-01 NOTE — PROCEDURES
Hemodialysis / [phone]    Vitals Pre Post Assessment Pre Post   BP BP: (!) 129/95 (01/01/22 0813) 136/70 LOC A&Oxself same   HR Pulse (Heart Rate): 85 (01/01/22 0813) 103 Lungs clear same   Resp Resp Rate: 18 (01/01/22 0813) 18 Cardiac RRR same   Temp Temp: 98 °F (36.7 °C) (01/01/22 0813) 97.7 Skin Open boil R buttock same   Weight    Edema None noted same   Tele status   Pain Pain Intensity 1: 0 (01/01/22 0302)      Orders   Duration: Start: 1070 End: 1146 Total: 3.5 hours   Dialyzer: Dialyzer/Set Up Inspection: Winnie Gilbert (01/01/22 0813)   K Bath: Dialysate K (mEq/L): 3 (01/01/22 0813)   Ca Bath: Dialysate CA (mEq/L): 2.5 (01/01/22 0813)   Na: Dialysate NA (mEq/L): 138 (01/01/22 0813)   Bicarb: Dialysate HCO3 (mEq/L): 35 (01/01/22 0813)   Target Fluid Removal: Goal/Amount of Fluid to Remove (mL): 0 mL (01/01/22 0813)     Access   Type & Location: RIJ CVC: Dressing CDI. No s/s of infection. Both lumens aspirate & flush well. Running well at . SBAR received from Primary RN. Pt arrived to HD suite A&Ox4. Consent signed & on file. Each catheter limb disinfected per p&p, caps removed, hubs disinfected per p&p. Each lumen aspirated for blood return and flushed with Normal Saline per policy. VSS. Dialysis Tx initiated.     Comments:  No s/s of infection noted                                      Labs   HBsAg (Antigen) / date: Neg 12/25/21                                              HBsAb (Antibody) / date: Susc 12/25/21   Source: EPIC   Obtained/Reviewed  Critical Results Called HGB   Date Value Ref Range Status   01/01/2022 10.1 (L) 12.1 - 17.0 g/dL Final     Potassium   Date Value Ref Range Status   01/01/2022 3.4 (L) 3.5 - 5.1 mmol/L Final     Calcium   Date Value Ref Range Status   01/01/2022 8.7 8.5 - 10.1 MG/DL Final     BUN   Date Value Ref Range Status   01/01/2022 48 (H) 6 - 20 MG/DL Final     Creatinine   Date Value Ref Range Status   01/01/2022 5.81 (H) 0.70 - 1.30 MG/DL Final        Meds Given Name Dose Route   Heparin 1:1000 1.7 Thai@[employer]   Heparin 1:1000 1.6 [email or web address]          Adequacy / Fluid    Total Liters Process: 78.2   Net Fluid Removed: 0      Comments   Time Out Done:   (Time) 0808   Admitting Diagnosis: AMS / Seizures   Consent obtained/signed: Informed Consent Verified: Yes (01/01/22 0813)   Machine / RO # Machine Number: [id number] (01/01/22 7035)   Primary Nurse Rpt Pre: Karely Moeller RN   Primary Nurse Rpt Post: Rosalva Smith RN   Pt Education: procedural   Care Plan: On going   Pts outpatient clinic: N/A     Tx Summary  0813:  RIJ CVC: Dressing CDI. No s/s of infection. Both lumens aspirate & flush well. Running well at . SBAR received from Primary RN. Pt arrived to HD suite A&Ox4. Consent signed & on file. Each catheter limb disinfected per p&p, caps removed, hubs disinfected per p&p. Each lumen aspirated for blood return and flushed with Normal Saline per policy. VSS. Dialysis Tx initiated. 0830:  Pt resting  0845:  Pt resting  0900:  Pt resting  0915:  Pt resting  0930:  Pt resting  0945:  Pt resting  1000:  Pt resting  1015:  Pt resting  1030:  Pt resting  1045:  Pt resting  1100:  Pt resting  1115:  Pt resitng  1130:  Pt resting  1146:  Tx ended. VSS. Each dialysis catheter limb disinfected per p&p, all possible blood returned per p&p, and each dialysis hub disinfected per p&p. Each lumen flushed, post dialysis catheter Heparin dwell instilled per order, and caps applied. Bed locked and in the lowest position, call bell and belongings in reach. SBAR given to Primary, RN. Patient is stable at time of their/ my departure. All Dialysis related medications have been reviewed. .   Comments:  Assessment performed by RN.   Procedure and documentation observed and reviewed by Paresh Javier RN

## 2022-01-01 NOTE — PROGRESS NOTES
Problem: Falls - Risk of  Goal: *Absence of Falls  Description: Document Vernia Colder Fall Risk and appropriate interventions in the flowsheet. Outcome: Progressing Towards Goal  Note: Fall Risk Interventions:  Mobility Interventions: Bed/chair exit alarm,Communicate number of staff needed for ambulation/transfer    Mentation Interventions: Adequate sleep, hydration, pain control,Bed/chair exit alarm    Medication Interventions: Bed/chair exit alarm,Patient to call before getting OOB    Elimination Interventions: Bed/chair exit alarm,Call light in reach              Problem: Pressure Injury - Risk of  Goal: *Prevention of pressure injury  Description: Document Loco Scale and appropriate interventions in the flowsheet.   Outcome: Progressing Towards Goal  Note: Pressure Injury Interventions:  Sensory Interventions: Minimize linen layers    Moisture Interventions: Internal/External urinary devices,Minimize layers    Activity Interventions: Assess need for specialty bed,Increase time out of bed    Mobility Interventions: Assess need for specialty bed    Nutrition Interventions: Document food/fluid/supplement intake    Friction and Shear Interventions: Minimize layers                Problem: Seizure Disorder (Adult)  Goal: *STG: Remains free of seizure activity  Outcome: Progressing Towards Goal  Goal: *STG: Maintains lab values within therapeutic range  Outcome: Progressing Towards Goal  Goal: *STG/LTG: Complies with medication therapy  Outcome: Progressing Towards Goal  Goal: *STG: Remains free of injury during seizure activity  Outcome: Progressing Towards Goal  Goal: *STG: Remains safe in hospital  Outcome: Progressing Towards Goal  Goal: Interventions  Outcome: Progressing Towards Goal

## 2022-01-01 NOTE — PROGRESS NOTES
End of Shift Note    Bedside shift change [facility] by Michelle Stephens RN (offgoing nurse). Report included the following information SBAR    Shift worked:  7-3   Shift summary and any significant changes:     HD today and changed to [employer] Sat schedule       Concerns for physician to address:  None   Zone phone for oncoming shift:   [phone]     Patient Information  Milla Bullard [address]  49 y.o.  12/28/2021 11:05 PM by Rashida Coley MD. [facility]     Problem List  Patient Active Problem List    Diagnosis Date Noted    Status epilepticus ([address] 12/28/2021    Aspiration pneumonia ([address] 12/28/2021    ESRD needing dialysis ([address] 12/28/2021    AMS (altered mental status) 12/28/2021    ATN (acute tubular necrosis) ([address] 12/25/2021    Interstitial nephritis determined by biopsy 12/25/2021    Thrombocytopenia ([address] 12/25/2021    Hypertension 12/25/2021    Sepsis ([address] 12/25/2021    Acute metabolic encephalopathy 39/40/9494    Hypoglycemia 11/25/2021    EUSEBIA (acute kidney injury) ([address] 73/18/6737    Metabolic acidemia 93/93/9771    Intractable nausea and vomiting 11/25/2021    Hyperglycemia due to type 2 diabetes mellitus ([address] 05/05/2019    Hyperosmolar non-ketotic state in patient with type 2 diabetes mellitus ([address] 12/16/2018     Past Medical History:   Diagnosis Date    Asthma     Diabetes ([address]     High cholesterol     Seizure ([address]        Core Measures:  CVA: No No  CHF:No No  PNA:No No    Activity:  Activity Level: Up with Assistance  Number times ambulated in hallways past shift: 0  Number of times OOB to chair past shift: 0    Cardiac:   Cardiac Monitoring: No      Cardiac Rhythm: Sinus Rhythm    Access:   Current line(s): PIV and HD access     Genitourinary:   Urinary status: bal  Urinary Catheter?  Yes Placement Date 12/28/2021 Reason Medically Necessary retention    Respiratory:   O2 Device: None (Room air)  Chronic home O2 use?: N/A  Incentive spirometer at bedside: N/A       GI:  Last Bowel Movement Date:  (unknown)  Current diet:  ADULT DIET Dysphagia - Soft & Bite Sized  Passing flatus: YES  Tolerating current diet: YES       Pain Management:   Patient states pain is manageable on current regimen: N/A    Skin:  Loco Score: 17  Interventions: increase time out of bed, limit briefs and internal/external urinary devices    Patient Safety:  Fall Score:  Total Score: 3  Interventions: bed/chair alarm, gripper socks and pt to call before getting OOB  High Fall Risk: Yes  @Rollbelt  @dexterity to release roll belt  Yes/No ( must document dexterity  here by stating Yes or No here, otherwise this is a restraint and must follow restraint documentation policy.)    DVT prophylaxis:  DVT prophylaxis Med- No  DVT prophylaxis SCD or ESVIN- No     Wounds: (If Applicable)  Wounds- No  Location     Active Consults:  IP CONSULT TO NEUROLOGY  IP CONSULT TO NEPHROLOGY  IP CONSULT TO PSYCHIATRY    Length of Stay:  Expected LOS: - - -  Actual LOS: 4  Discharge Plan: No; MAHOGANY Willams RN

## 2022-01-01 NOTE — PROGRESS NOTES
Problem: Falls - Risk of  Goal: *Absence of Falls  Description: Document [place] Fall Risk and appropriate interventions in the flowsheet.   Note: Fall Risk Interventions:  Mobility Interventions: Bed/chair exit alarm,Patient to call before getting OOB    Mentation Interventions: Adequate sleep, hydration, pain control,Bed/chair exit alarm,More frequent rounding    Medication Interventions: Bed/chair exit alarm,Patient to call before getting OOB    Elimination Interventions: Bed/chair exit alarm,Call light in reach,Toileting schedule/hourly rounds

## 2022-01-02 LAB
ANION GAP SERPL CALC-SCNC: 8 MMOL/L (ref 5–15)
BUN SERPL-MCNC: 28 MG/DL (ref 6–20)
BUN/CREAT SERPL: 8 (ref 12–20)
CALCIUM SERPL-MCNC: 8.2 MG/DL (ref 8.5–10.1)
CHLORIDE SERPL-SCNC: 95 MMOL/L (ref 97–108)
CO2 SERPL-SCNC: 30 MMOL/L (ref 21–32)
CREAT SERPL-MCNC: 3.63 MG/DL (ref 0.7–1.3)
GLUCOSE BLD STRIP.AUTO-MCNC: 106 MG/DL (ref 65–117)
GLUCOSE BLD STRIP.AUTO-MCNC: 138 MG/DL (ref 65–117)
GLUCOSE BLD STRIP.AUTO-MCNC: 274 MG/DL (ref 65–117)
GLUCOSE BLD STRIP.AUTO-MCNC: 348 MG/DL (ref 65–117)
GLUCOSE SERPL-MCNC: 70 MG/DL (ref 65–100)
POTASSIUM SERPL-SCNC: 3.4 MMOL/L (ref 3.5–5.1)
SERVICE CMNT-IMP: ABNORMAL
SERVICE CMNT-IMP: NORMAL
SODIUM SERPL-SCNC: 133 MMOL/L (ref 136–145)

## 2022-01-02 PROCEDURE — 82962 GLUCOSE BLOOD TEST: CPT

## 2022-01-02 PROCEDURE — 74011250637 HC RX REV CODE- 250/637: Performed by: INTERNAL MEDICINE

## 2022-01-02 PROCEDURE — 74011250636 HC RX REV CODE- 250/636: Performed by: HOSPITALIST

## 2022-01-02 PROCEDURE — 74011250637 HC RX REV CODE- 250/637: Performed by: PSYCHIATRY & NEUROLOGY

## 2022-01-02 PROCEDURE — 74011636637 HC RX REV CODE- 636/637: Performed by: HOSPITALIST

## 2022-01-02 PROCEDURE — 74011250637 HC RX REV CODE- 250/637: Performed by: HOSPITALIST

## 2022-01-02 PROCEDURE — 74011636637 HC RX REV CODE- 636/637: Performed by: STUDENT IN AN ORGANIZED HEALTH CARE EDUCATION/TRAINING PROGRAM

## 2022-01-02 PROCEDURE — 2709999900 HC NON-CHARGEABLE SUPPLY

## 2022-01-02 PROCEDURE — 80048 BASIC METABOLIC PNL TOTAL CA: CPT

## 2022-01-02 PROCEDURE — 74011000258 HC RX REV CODE- 258: Performed by: HOSPITALIST

## 2022-01-02 PROCEDURE — 74011250637 HC RX REV CODE- 250/637: Performed by: STUDENT IN AN ORGANIZED HEALTH CARE EDUCATION/TRAINING PROGRAM

## 2022-01-02 PROCEDURE — 65660000000 HC RM CCU STEPDOWN

## 2022-01-02 RX ORDER — AMLODIPINE BESYLATE 5 MG/1
5 TABLET ORAL DAILY
Status: DISCONTINUED | OUTPATIENT
Start: 2022-01-02 | End: 2022-01-22 | Stop reason: HOSPADM

## 2022-01-02 RX ADMIN — Medication 4 UNITS: at 12:57

## 2022-01-02 RX ADMIN — THIAMINE HYDROCHLORIDE 250 MG: 100 INJECTION, SOLUTION INTRAMUSCULAR; INTRAVENOUS at 08:43

## 2022-01-02 RX ADMIN — BUMETANIDE 1 MG: 1 TABLET ORAL at 17:42

## 2022-01-02 RX ADMIN — BUMETANIDE 1 MG: 1 TABLET ORAL at 08:42

## 2022-01-02 RX ADMIN — AMLODIPINE BESYLATE 5 MG: 5 TABLET ORAL at 12:00

## 2022-01-02 RX ADMIN — ACETAMINOPHEN 650 MG: 325 TABLET ORAL at 12:02

## 2022-01-02 RX ADMIN — LACOSAMIDE 100 MG: 50 TABLET, FILM COATED ORAL at 15:17

## 2022-01-02 RX ADMIN — LEVETIRACETAM 500 MG: 500 TABLET, FILM COATED ORAL at 01:02

## 2022-01-02 RX ADMIN — LEVETIRACETAM 500 MG: 500 TABLET, FILM COATED ORAL at 12:57

## 2022-01-02 RX ADMIN — LACOSAMIDE 100 MG: 50 TABLET, FILM COATED ORAL at 01:02

## 2022-01-02 RX ADMIN — Medication 15 UNITS: at 08:43

## 2022-01-02 RX ADMIN — Medication 2 UNITS: at 22:42

## 2022-01-02 NOTE — PROGRESS NOTES
Hospitalist Progress Note    NAME: Linda Villeda   :  1965   MRN:  [id number]       Assessment / Plan:  Acute metabolic encephalopathy  Status epilepticus  History of seizure disorder  Aspiration pneumonia  MRI brainno acute abnormality. CT headno acute abnormality. Lumbar puncture doneCSF reveals no infectious etiology so far. CSF culture negative. CSF HSV negative so we will discontinue the acyclovir. CSF meningitis panel negative. Out side hospital EEG indication triphasic slowing and focal 3hz right fronto temproal field siezure activety. EEG done here shows no seizure. On continuous EEGno more seizure reported on the continuous EEG. Neurology on boardappreciate input. Continue Keppra along with Vimpat. Seizure precaution. Chest x-rayno acute pathology.  -Treated the course of Levaquin today. WBC stable, procalcitonin 0.47. Repeat blood culture negative. Patient more alert and awake, oriented to self but not to time or place. Consulted psychiatryrecommend Haldol as needed. End-stage renal disease on dialysis  Nephrology was consultedhemodialysis per nephrology. Dialysis days changed today to Tuesday, Thursday and Saturday  Continue diuretics. Having good amount of urine output. Creatinine improved. Hypertension  Diabetes type 2  Hyperlipidemia  Polysubstance abuse  Sliding scale insulin along with Lantus. Body mass index is 20.58 kg/m². Estimated discharge date:   Barriers:    Code status: Full  Prophylaxis: Lovenox  Recommended Disposition:      Subjective:     Chief Complaint / Reason for Physician Visit  Patient seen and examined today, much more awake and alert oriented to self but not to the place and person. Objective:     VITALS:   Last 24hrs VS reviewed since prior progress note.  Most recent are:  Patient Vitals for the past 24 hrs:   Temp Pulse Resp BP SpO2   22 1154 98 °F (36.7 °C) 89 18 138/64 95 % 01/02/22 0243 97.8 °F (36.6 °C) 96 18 (!) 164/90 100 %   01/01/22 2302 98.1 °F (36.7 °C) 86 18 (!) 148/90 98 %   01/01/22 1559 98.5 °F (36.9 °C) 84 16 (!) 153/89 99 %       Intake/Output Summary (Last 24 hours) at 1/2/2022 1345  Last data filed at 1/2/2022 7440  Gross per 24 hour   Intake 900 ml   Output 2200 ml   Net -1300 ml        I had a face to face encounter and independently examined this patient on 1/2/2022, as outlined below:  PHYSICAL EXAM:  General: WD, WN. Awake, not alert, cooperative, no acute distress    EENT:  EOMI. Anicteric sclerae. MMM  Resp:  CTA bilaterally, no wheezing or rales. No accessory muscle use  CV:  Regular  rhythm,  No edema  GI:  Soft, Non distended, Non tender. +Bowel sounds  Neurologic:  Awake but confused, normal speech,   Psych:   Good insight. Not anxious nor agitated  Skin:  No rashes. No jaundice    Reviewed most current lab test results and cultures  YES  Reviewed most current radiology test results   YES  Review and summation of old records today    NO  Reviewed patient's current orders and MAR    YES  PMH/ reviewed - no change compared to H&P  ________________________________________________________________________  Care Plan discussed with:    Comments   Patient x    Family      RN x    Care Manager     Consultant                        Multidiciplinary team rounds were held today with , nursing, pharmacist and clinical coordinator. Patient's plan of care was discussed; medications were reviewed and discharge planning was addressed.      ________________________________________________________________________  Total NON critical care TIME:  32  Minutes    Total CRITICAL CARE TIME Spent:   Minutes non procedure based      Comments   >50% of visit spent in counseling and coordination of care     ________________________________________________________________________  Yulissa Fleming MD     Procedures: see electronic medical records for all procedures/Xrays and details which were not copied into this note but were reviewed prior to creation of Plan. LABS:  I reviewed today's most current labs and imaging studies.   Pertinent labs include:  Recent Labs     01/01/22  0219   WBC 9.3   HGB 10.1*   HCT 30.8*        Recent Labs     01/02/22  0105 01/01/22  0740 12/31/21  0643   * 128* 134*   K 3.4* 3.4* 3.4*   CL 95* 90* 97   CO2 30 27 25   GLU 70 313* 41*   BUN 28* 48* 39*   CREA 3.63* 5.81* 5.02*   CA 8.2* 8.7 8.7   PHOS  --  4.8*  --    ALB  --  2.6*  --        Signed: Keeley Johnson MD

## 2022-01-03 LAB
BACTERIA SPEC CULT: NORMAL
GLUCOSE BLD STRIP.AUTO-MCNC: 105 MG/DL (ref 65–117)
GLUCOSE BLD STRIP.AUTO-MCNC: 192 MG/DL (ref 65–117)
GLUCOSE BLD STRIP.AUTO-MCNC: 228 MG/DL (ref 65–117)
GLUCOSE BLD STRIP.AUTO-MCNC: 311 MG/DL (ref 65–117)
SERVICE CMNT-IMP: ABNORMAL
SERVICE CMNT-IMP: NORMAL
SERVICE CMNT-IMP: NORMAL

## 2022-01-03 PROCEDURE — 74011250637 HC RX REV CODE- 250/637: Performed by: PSYCHIATRY & NEUROLOGY

## 2022-01-03 PROCEDURE — 74011250637 HC RX REV CODE- 250/637: Performed by: NURSE PRACTITIONER

## 2022-01-03 PROCEDURE — 82962 GLUCOSE BLOOD TEST: CPT

## 2022-01-03 PROCEDURE — 65660000000 HC RM CCU STEPDOWN

## 2022-01-03 PROCEDURE — 74011636637 HC RX REV CODE- 636/637: Performed by: HOSPITALIST

## 2022-01-03 PROCEDURE — 74011250636 HC RX REV CODE- 250/636: Performed by: HOSPITALIST

## 2022-01-03 PROCEDURE — 92526 ORAL FUNCTION THERAPY: CPT

## 2022-01-03 PROCEDURE — 74011000258 HC RX REV CODE- 258: Performed by: HOSPITALIST

## 2022-01-03 PROCEDURE — 74011250637 HC RX REV CODE- 250/637: Performed by: STUDENT IN AN ORGANIZED HEALTH CARE EDUCATION/TRAINING PROGRAM

## 2022-01-03 PROCEDURE — 74011636637 HC RX REV CODE- 636/637: Performed by: STUDENT IN AN ORGANIZED HEALTH CARE EDUCATION/TRAINING PROGRAM

## 2022-01-03 RX ORDER — BUMETANIDE 1 MG/1
1 TABLET ORAL DAILY
Status: DISCONTINUED | OUTPATIENT
Start: 2022-01-04 | End: 2022-01-04

## 2022-01-03 RX ADMIN — LEVETIRACETAM 500 MG: 500 TABLET, FILM COATED ORAL at 01:13

## 2022-01-03 RX ADMIN — ACETAMINOPHEN: 500 TABLET ORAL at 21:53

## 2022-01-03 RX ADMIN — Medication 2 UNITS: at 13:11

## 2022-01-03 RX ADMIN — LEVETIRACETAM 500 MG: 500 TABLET, FILM COATED ORAL at 13:11

## 2022-01-03 RX ADMIN — LACOSAMIDE 100 MG: 50 TABLET, FILM COATED ORAL at 01:13

## 2022-01-03 RX ADMIN — LACOSAMIDE 100 MG: 50 TABLET, FILM COATED ORAL at 14:14

## 2022-01-03 RX ADMIN — Medication 4 UNITS: at 09:48

## 2022-01-03 RX ADMIN — AMLODIPINE BESYLATE 5 MG: 5 TABLET ORAL at 11:02

## 2022-01-03 RX ADMIN — Medication 15 UNITS: at 09:49

## 2022-01-03 RX ADMIN — THIAMINE HYDROCHLORIDE 250 MG: 100 INJECTION, SOLUTION INTRAMUSCULAR; INTRAVENOUS at 14:13

## 2022-01-03 NOTE — PROGRESS NOTES
of Shift Note     Bedside shift change report given to GERARDO Hartmann (oncoming nurse) by GERARDO Madison (offgoing nurse).   Report included the following information SBAR, Kardex and ED Summary     Shift worked:  night   Shift summary and any significant changes:     No agitation         Concerns for physician to address:  none   Zone phone for oncoming shift:   [phone]      Patient Information  Marcello Graham [address]  49 y.o.  12/28/2021 11:05 Carmelo. Anish Arevalo MD. Munir Acosta admitted     Problem List          Patient Active Problem List     Diagnosis Date Noted    Status epilepticus ([address] 12/28/2021    Aspiration pneumonia ([address] 12/28/2021    ESRD needing dialysis ([address] 12/28/2021    AMS (altered mental status) 12/28/2021    ATN (acute tubular necrosis) ([address] 12/25/2021    Interstitial nephritis determined by biopsy 12/25/2021    Thrombocytopenia ([address] 12/25/2021    Hypertension 12/25/2021    Sepsis ([address] 12/25/2021    Acute metabolic encephalopathy 33/20/3379    Hypoglycemia 11/25/2021    EUSEBIA (acute kidney injury) ([address] 87/34/5966    Metabolic acidemia 85/85/8337    Intractable nausea and vomiting 11/25/2021    Hyperglycemia due to type 2 diabetes mellitus ([address] 05/05/2019    Hyperosmolar non-ketotic state in patient with type 2 diabetes mellitus ([address] 12/16/2018              Past Medical History:   Diagnosis Date    Asthma      Diabetes ([address]      High cholesterol      Seizure ([facility])           Activity:  Activity Level: Bed Rest  Number times ambulated in hallways past shift: 0  Number of times OOB to chair past shift: 0     Cardiac:   Cardiac Monitoring: no     Cardiac Rhythm: no     Access:   Current line(s): PIV and HD access   Central Line? Yes Placement date [facility] Medically Necessary Hemodialysis  PICC LINE? No Placement date Reason Medically Necessary      Genitourinary:   Urinary status: incontinent  Urinary Catheter? Yes Placement Date 12/24 Reason Medically Necessary retention     Respiratory:   O2 Device: None (Room air)  Chronic home O2 use?: NO  Incentive spirometer at bedside: NO     GI:  Current diet:  DIET regular  Passing flatus: YES  Tolerating current diet: yes     Pain Management:   Patient states pain is manageable on current regimen: N/A, nonverbal     Skin:  Loco Score: 14  Interventions: float heels, internal/external urinary devices and nutritional support     Patient Safety:  Fall Score: Total Score: 3  Interventions: bed/chair alarm  High Fall Risk: Yes  @Rollbelt  @dexterity to release roll belt  Yes/No ( must document dexterity  here by stating Yes or No here, otherwise this is a restraint and must follow restraint documentation policy.)     DVT prophylaxis:  DVT prophylaxis Med- Yes  DVT prophylaxis SCD or ESVIN- No      Wounds: (If Applicable)  Wounds- No  Location      Active Consults:  IP CONSULT TO NEUROLOGY  IP CONSULT TO NEPHROLOGY     Length of Stay:  Expected LOS: - - -  Actual LOS: 2  Discharge Plan: No TBD        Elvin Boss RN

## 2022-01-03 NOTE — PROGRESS NOTES
Problem: Falls - Risk of  Goal: *Absence of Falls  Description: Document Ruchi Don Fall Risk and appropriate interventions in the flowsheet.   Note: Fall Risk Interventions:  Mobility Interventions: Bed/chair exit alarm,Communicate number of staff needed for ambulation/transfer    Mentation Interventions: Adequate sleep, hydration, pain control,Bed/chair exit alarm,Evaluate medications/consider consulting pharmacy    Medication Interventions: Patient to call before getting OOB,Evaluate medications/consider consulting pharmacy,Bed/chair exit alarm    Elimination Interventions: Call light in reach,Bed/chair exit alarm

## 2022-01-03 NOTE — PROGRESS NOTES
Spiritual Care Assessment/Progress Note  [facility]      NAME: Becka Duncan      MRN: [id number]  AGE: 49 y.o.  SEX: male  Scientologist Affiliation: Confucianist   Language: English     1/3/2022     Total Time (in minutes): 16     Spiritual Assessment begun in [facility]CI[facility]LE[facility] through conversation with:         [x]Patient        [] Family    [] Friend(s)        Reason for Consult: Initial/Spiritual assessment, patient floor     Spiritual beliefs: (Please include comment if needed)     [x] Identifies with a harish tradition: Confucianist         [] Supported by a harish community:            [] Claims no spiritual orientation:           [] Seeking spiritual identity:                [] Adheres to an individual form of spirituality:           [] Not able to assess:                           Identified resources for coping:      [] Prayer                               [] Music                  [] Guided Imagery     [] Family/friends                 [] Pet visits     [] Devotional reading                         [x] Unknown     [] Other:                                               Interventions offered during this visit: (See comments for more details)    Patient Interventions: Affirmation of emotions/emotional suffering,Catharsis/review of pertinent events in supportive environment,Coping skills reviewed/reinforced,Life review/legacy,Initial/Spiritual assessment, patient floor           Plan of Care:     [] Support spiritual and/or cultural needs    [] Support AMD and/or advance care planning process      [] Support grieving process   [] Coordinate Rites and/or Rituals    [] Coordination with community clergy   [] No spiritual needs identified at this time   [] Detailed Plan of Care below (See Comments)  [] Make referral to Music Therapy  [] Make referral to Pet Therapy     [] Make referral to Addiction services  [] Make referral to [facility]  [] Make referral to Spiritual Care Partner  [] No future visits requested        [x] Contact Spiritual Care for further referrals     Comments:     Initial Spiritual Care Assessment visit for Mr. Betito Riley in 21 . Reviewed the chart before visit. Patient was alert, no family was present during the visit. Patient shared about his symptoms and feelings, he was suffering from sores all over his right side of the body, and expressed feelings of emotional fatigue due to his extended hospital stay. According to him, this is his third hospital, he does not have family support or spiritual support. He also expressed complaint about  that he does not eat rice and meat, asked them not to put them in his meal, but he receives rice and meat constantly. They visit was ended by the call for . Will make another attempt as able.      [id number] Stephenie Murphy,[place], [place] [address] Provider   Paging Service [phone])

## 2022-01-03 NOTE — PROGRESS NOTES
Problem: Dysphagia (Adult)  Goal: *Acute Goals and Plan of Care (Insert Text)  Description: Speech Pathology Goals  Initiated 12/30/2021    1. Patient will tolerate Soft&Bite-Sized/Thin Liquids without overt difficulty within 7 days. MET 1/3/2022  Added 1/3/2022: 2. Patient will tolerate Easy to Chew/Thin Liquids without signs of aspiration within 7 days. Outcome: Progressing Towards Goal     SPEECH LANGUAGE PATHOLOGY DYSPHAGIA TREATMENT  Patient: Emilee Zhou (69 y.o. male)  Date: 1/3/2022  Diagnosis: AMS (altered mental status) [R41.82]  Status epilepticus ([facility] [G40.901]  Hypoglycemia [E16.2]  ESRD needing dialysis ([facility] [N18.6, Z99.2]  Aspiration pneumonia ([facility] [J69.0] <principal problem not specified>       Precautions:       ASSESSMENT:  Patient is being followed by SLP and initial evaluation on 12/30 revealed mild oral dysphagia and suspected WFL pharyngeal phase and Soft&Bite-Sized/Thin Liquids was recommended. On this date, patient Ox2, with improved command following, and participation in conversation. Patient with improved oral phase, yet still with prolonged mastication (suspect due to edentulism). No signs of aspiration observed with any consistencies tried. Given patient's history of seizures, recent hospitalization for aspiration pneumonia, and mental status, patient is at risk for aspiration. Recommend Easy to Chew/Thin Liquids with aspiration precautions outlined below. Patient educated re: SLP POC. PLAN:  Recommendations and Planned Interventions:  -- Easy to Chew/Thin Liquids  -- Medication as Tolerated  -- 1:1 Assistance/Supervision  -- Small Bites/Sips  -- Single Sips/Bites (1 at a Time)  -- Sitting Upright    Patient continues to benefit from skilled intervention to address the above impairments. Continue treatment per established plan of care. Discharge Recommendations:   To Be Determined     SUBJECTIVE:   Patient stated I drink diet coke but I know y'all don't have that.    OBJECTIVE:   Cognitive and Communication Status:  Neurologic State: Alert,Confused  Orientation Level: Oriented to person,Oriented to place,Disoriented to situation,Disoriented to time,Other (Comment) (Oriented to hospital)  Cognition: Follows commands  Perception: Appears intact  Perseveration: No perseveration noted  Safety/Judgement: Not assessed    Dysphagia Treatment:    P.O. Trials:  Patient Position: Upright in bed  Vocal quality prior to P.O.: No impairment  Consistency Presented: Thin liquid; Solid  How Presented: Self-fed/presented;Straw;Successive swallows     Bolus Acceptance: No impairment  Bolus Formation/Control: Impaired  Type of Impairment: Delayed;Mastication  Propulsion: Delayed (# of seconds)  Oral Residue: None  Initiation of Swallow: No impairment  Laryngeal Elevation: Functional  Aspiration Signs/Symptoms: None  Pharyngeal Phase Characteristics: No impairment, issues, or problems              Oral Phase Severity: Mild  Pharyngeal Phase Severity : No impairment    Pain:  Pain Scale 1: Numeric (0 - 10)  Pain Intensity 1: 9  Pain Location 1: Back;Knee    After treatment:   Patient left in no apparent distress in bed, Call bell within reach, and Nursing notified    COMMUNICATION/EDUCATION:   The patient's plan of care including recommendations, planned interventions, and recommended diet changes were discussed with: Registered nurse.      Sagrario Aparicio SLP  Time Calculation: 10 mins

## 2022-01-03 NOTE — PROGRESS NOTES
End of Shift Note    Bedside shift change report given to Alpa RN (oncoming nurse) by Fidelia Lee RN (offgoing nurse). Report included the following information SBAR, Kardex and Recent Results    Shift worked: 7a-7p   Shift summary and any significant changes:    No significant changes. Concerns for physician to address: None   Zone phone for oncoming shift:  [phone]     Patient Information  [facility] [address]  49 y.o.  12/28/2021 11:05 PM by Ani Matos MD. Yvonne Calzada was admitted from the [facility]. Problem List  Patient Active Problem List    Diagnosis Date Noted    Status epilepticus ([address] 12/28/2021    Aspiration pneumonia ([address] 12/28/2021    ESRD needing dialysis ([address] 12/28/2021    AMS (altered mental status) 12/28/2021    ATN (acute tubular necrosis) ([address] 12/25/2021    Interstitial nephritis determined by biopsy 12/25/2021    Thrombocytopenia ([address] 12/25/2021    Hypertension 12/25/2021    Sepsis ([address] 12/25/2021    Acute metabolic encephalopathy 17/36/4753    Hypoglycemia 11/25/2021    EUSEBIA (acute kidney injury) ([address] 12/94/9847    Metabolic acidemia 85/29/5319    Intractable nausea and vomiting 11/25/2021    Hyperglycemia due to type 2 diabetes mellitus ([address] 05/05/2019    Hyperosmolar non-ketotic state in patient with type 2 diabetes mellitus ([address] 12/16/2018     Past Medical History:   Diagnosis Date    Asthma     Diabetes ([address]     High cholesterol     Seizure ([address]        Core Measures:  CVA: No No  CHF:No No  PNA: Yes Yes    Activity:  Activity Level: Bed Rest  Number times ambulated in hallways past shift: 0  Number of times OOB to chair past shift: 0    Cardiac:   Cardiac Monitoring: No      Cardiac Rhythm: Sinus Rhythm    Access:   Current line(s): PIV and HD access  Central Line? No   PICC LINE? No     Genitourinary:   Urinary status: bal  Urinary Catheter?  Yes Placement Date 12/24 Reason Medically Necessary for retention and for strict I&O    Respiratory:   O2 Device: None (Room air)  Chronic home O2 use?: YES  Incentive spirometer at bedside: NO       GI:  Last Bowel Movement Date:  (unknown)  Current diet:  ADULT DIET Easy to Chew  Passing flatus: YES  Tolerating current diet: YES       Pain Management:   Patient states pain is manageable on current regimen: Yes    Skin:  Loco Score: 16  Interventions: increase time out of bed and nutritional support     Patient Safety:  Fall Score:  Total Score: 3  Interventions: bed/chair alarm, assistive device (walker, cane, etc), gripper socks, pt to call before getting OOB and stay with me (per policy)  High Fall Risk: Yes  @Rollbelt  @dexterity to release roll belt  Yes/No ( must document dexterity  here by stating Yes or No here, otherwise this is a restraint and must follow restraint documentation policy.)    DVT prophylaxis:  DVT prophylaxis Med- Yes  DVT prophylaxis SCD or ESVIN- No     Wounds: (If Applicable)  Wounds- No    Active Consults:  IP CONSULT TO NEUROLOGY  IP CONSULT TO NEPHROLOGY  IP CONSULT TO PSYCHIATRY    Length of Stay:  Expected LOS: - - -  Actual LOS: 6  Discharge Plan: 1/10/22      Author GERARDO Gillespie

## 2022-01-03 NOTE — PROGRESS NOTES
Nephrology Progress Note  Dorian Antonio     [email or web address]  Phone - [phone]   Patient: Deisy Nicholson    YOB: 1965        Date- 1/3/2022   Admit Date: 12/28/2021  CC: Follow up for eusebia       IMPRESSION & PLAN:    EUSEBIA ON HD - on dialysis MWF with [facility]   Metabolic encephalopathy   History of hypoglycemia   Interstitial nephritis determined by biopsy NSAID induced   Hypertension   Sepsis   Anemia    PLAN-   No hd today   Check bmp in am- if cr wrose- he will need hd on turday   Continue norvasc, bumex         Subjective: Interval History:   -no labs done today  Patient was seen and examined   bp stable      Objective:   Vitals:    01/02/22 1608 01/02/22 2038 01/03/22 0400 01/03/22 0820   BP: (!) 139/95 (!) 141/86 (!) 146/83 (!) 149/96   Pulse: 81 83 84 80   Resp: 18 18 18 18   Temp: 97.9 °F (36.6 °C) 98.6 °F (37 °C) 98.5 °F (36.9 °C) 98.2 °F (36.8 °C)   TempSrc:       SpO2: 95% 94% 95% 97%   Weight:          01/02 0701 - 01/03 0700  In: -   Out: 2000 [Urine:2000]  Last 3 Recorded Weights in this Encounter    12/31/21 0634   Weight: 51 kg (112 lb 8 oz)      Physical exam:   GEN:  NAD  NECK:  Supple, no thyromegaly  RESP: Clear  b/l, no  wheezing,   CVS: RRR,S1,S2   NEURO: non focal,   EXT:no Edema +nt       Right permcath      Chart reviewed. Pertinent Notes reviewed. Data Review :  Recent Labs     01/02/22  0105 01/01/22  0740   * 128*   K 3.4* 3.4*   CL 95* 90*   CO2 30 27   BUN 28* 48*   CREA 3.63* 5.81*   GLU 70 313*   CA 8.2* 8.7   PHOS  --  4.8*     Recent Labs     01/01/22  0219   WBC 9.3   HGB 10.1*   HCT 30.8*        No results for input(s): FE, TIBC, PSAT, FERR in the last 72 hours.    Medication list  reviewed  Current Facility-Administered Medications   Medication Dose Route Frequency    amLODIPine (NORVASC) tablet 5 mg  5 mg Oral DAILY    heparin (porcine) 1,000 unit/mL injection 1,700 Units 1,700 Units InterCATHeter DIALYSIS PRN    And    heparin (porcine) 1,000 unit/mL injection 1,600 Units  1,600 Units InterCATHeter DIALYSIS PRN    bumetanide (BUMEX) tablet 1 mg  1 mg Oral BID    levETIRAcetam (KEPPRA) tablet 500 mg  500 mg Oral BID    lacosamide (VIMPAT) tablet 100 mg  100 mg Oral Q12H    insulin glargine (LANTUS) injection 15 Units  15 Units SubCUTAneous DAILY    thiamine (B-1) 250 mg in 0.9% sodium chloride 50 mL IVPB  250 mg IntraVENous DAILY    hydrALAZINE (APRESOLINE) 20 mg/mL injection 10 mg  10 mg IntraVENous Q6H PRN    ondansetron (ZOFRAN ODT) tablet 4 mg  4 mg Oral Q8H PRN    Or    ondansetron (ZOFRAN) injection 4 mg  4 mg IntraVENous Q6H PRN    acetaminophen (TYLENOL) tablet 650 mg  650 mg Oral Q4H PRN    Or    acetaminophen (TYLENOL) solution 650 mg  650 mg Per NG tube Q4H PRN    Or    acetaminophen (TYLENOL) suppository 650 mg  650 mg Rectal Q4H PRN    insulin lispro (HUMALOG) injection   SubCUTAneous AC&HS    glucose chewable tablet 16 g  4 Tablet Oral PRN    dextrose (D50W) injection syrg 12.5-25 g  12.5-25 g IntraVENous PRN    glucagon (GLUCAGEN) injection 1 mg  1 mg IntraMUSCular PRN          Victor Hugo Mireles MD              [facility]  [facility] / [facility], [address], [address]  Phone - [phone]               Fax - [phone]

## 2022-01-03 NOTE — PROGRESS NOTES
Hospitalist Progress Note    NAME: Mohan Dalton   :  1965   MRN:  [id number]       Assessment / Plan:  Acute metabolic encephalopathy  Status epilepticus  History of seizure disorder  Aspiration pneumonia  MRI brainno acute abnormality. CT headno acute abnormality. Lumbar puncture doneCSF reveals no infectious etiology so far. CSF culture negative. CSF HSV negative so we will discontinue the acyclovir. CSF meningitis panel negative. Out side hospital EEG indication triphasic slowing and focal 3hz right fronto temproal field siezure activety. EEG done here shows no seizure. On continuous EEGno more seizure reported on the continuous EEG. Neurology on boardappreciate input. Continue Keppra along with Vimpat. Seizure precaution. Chest x-rayno acute pathology.  -Treated the course of Levaquin today. WBC stable, procalcitonin 0.47. Repeat blood culture negative. Patient more alert and awake, oriented to self but not to time or place. Consulted psychiatryrecommend Haldol as needed. Mental status may be the new baseline, will follow closely. Awaiting further neurology recommendations    End-stage renal disease on dialysis  Nephrology was consultedhemodialysis per nephrology. Dialysis days changed today to Tuesday, Thursday and Saturday  Creatinine 3.63, will check tomorrow. Continue diuretics per nephrology  Having good amount of urine output. Hypertension  Diabetes type 2  Hyperlipidemia  Polysubstance abuse  Sliding scale insulin along with Lantus. Body mass index is 20.58 kg/m². Estimated discharge date:   Barriers:    Code status: Full  Prophylaxis: Lovenox  Recommended Disposition:      Subjective:     Chief Complaint / Reason for Physician Visit  Patient seen and examined today, complaining of soreness all over the body. Objective:     VITALS:   Last 24hrs VS reviewed since prior progress note.  Most recent are:  Patient Vitals for the past 24 hrs:   Temp Pulse Resp BP SpO2   01/03/22 0820 98.2 °F (36.8 °C) 80 18 (!) 149/96 97 %   01/03/22 0400 98.5 °F (36.9 °C) 84 18 (!) 146/83 95 %   01/02/22 2038 98.6 °F (37 °C) 83 18 (!) 141/86 94 %   01/02/22 1608 97.9 °F (36.6 °C) 81 18 (!) 139/95 95 %       Intake/Output Summary (Last 24 hours) at 1/3/2022 1337  Last data filed at 1/2/2022 2058  Gross per 24 hour   Intake    Output 2000 ml   Net -2000 ml        I had a face to face encounter and independently examined this patient on 1/3/2022, as outlined below:  PHYSICAL EXAM:  General: WD, WN. Awake, not alert, cooperative, no acute distress    EENT:  EOMI. Anicteric sclerae. MMM  Resp:  CTA bilaterally, no wheezing or rales. No accessory muscle use  CV:  Regular  rhythm,  No edema  GI:  Soft, Non distended, Non tender. +Bowel sounds  Neurologic:  Awake but confused, normal speech,   Psych:   Good insight. Not anxious nor agitated  Skin:  No rashes. No jaundice    Reviewed most current lab test results and cultures  YES  Reviewed most current radiology test results   YES  Review and summation of old records today    NO  Reviewed patient's current orders and MAR    YES  PMH/ reviewed - no change compared to H&P  ________________________________________________________________________  Care Plan discussed with:    Comments   Patient x    Family      RN x    Care Manager     Consultant                       x Multidiciplinary team rounds were held today with , nursing, pharmacist and clinical coordinator. Patient's plan of care was discussed; medications were reviewed and discharge planning was addressed.      ________________________________________________________________________  Total NON critical care TIME:  32  Minutes    Total CRITICAL CARE TIME Spent:   Minutes non procedure based      Comments   >50% of visit spent in counseling and coordination of care ________________________________________________________________________  Ace Monk MD     Procedures: see electronic medical records for all procedures/Xrays and details which were not copied into this note but were reviewed prior to creation of Plan. LABS:  I reviewed today's most current labs and imaging studies.   Pertinent labs include:  Recent Labs     01/01/22  0219   WBC 9.3   HGB 10.1*   HCT 30.8*        Recent Labs     01/02/22  0105 01/01/22  0740   * 128*   K 3.4* 3.4*   CL 95* 90*   CO2 30 27   GLU 70 313*   BUN 28* 48*   CREA 3.63* 5.81*   CA 8.2* 8.7   PHOS  --  4.8*   ALB  --  2.6*       Signed: Ace Monk MD

## 2022-01-03 NOTE — PROGRESS NOTES
of Shift Note     Bedside shift change report given to Crow Hay RN (oncoming nurse) by Vickie Pierre RN (offgoing nurse).   Report included the following information SBAR, Kardex and ED Summary     Shift worked:  7am-7pm   Shift summary and any significant changes:     No agitation         Concerns for physician to address:  none   Zone phone for oncoming shift:   [phone]      Patient Information  Garrett Mcardle [address]  49 y.o.  12/28/2021 11:05 PM by Naveed Macedo MD. Munir Curtisa Getting admitted     Problem List          Patient Active Problem List     Diagnosis Date Noted    Status epilepticus ([address] 12/28/2021    Aspiration pneumonia ([address] 12/28/2021    ESRD needing dialysis ([address] 12/28/2021    AMS (altered mental status) 12/28/2021    ATN (acute tubular necrosis) ([address] 12/25/2021    Interstitial nephritis determined by biopsy 12/25/2021    Thrombocytopenia ([address] 12/25/2021    Hypertension 12/25/2021    Sepsis ([address] 12/25/2021    Acute metabolic encephalopathy 55/81/2644    Hypoglycemia 11/25/2021    EUSEBIA (acute kidney injury) ([address] 95/48/9526    Metabolic acidemia 48/66/6765    Intractable nausea and vomiting 11/25/2021    Hyperglycemia due to type 2 diabetes mellitus ([address] 05/05/2019    Hyperosmolar non-ketotic state in patient with type 2 diabetes mellitus ([address] 12/16/2018              Past Medical History:   Diagnosis Date    Asthma      Diabetes ([address]      High cholesterol      Seizure (HCC)           Activity:  Activity Level: Bed Rest  Number times ambulated in hallways past shift: 0  Number of times OOB to chair past shift: 0     Cardiac:   Cardiac Monitoring: no     Cardiac Rhythm: no     Access:   Current line(s): PIV and HD access   Central Line? Yes Placement date [facility] Medically Necessary Hemodialysis  PICC LINE? No Placement date Reason Medically Necessary      Genitourinary:   Urinary status: incontinent  Urinary Catheter? Yes Placement Date 12/24 Reason Medically Necessary retention     Respiratory:   O2 Device: None (Room air)  Chronic home O2 use?: NO  Incentive spirometer at bedside: NO     GI:  Current diet:  DIET regular  Passing flatus: YES  Tolerating current diet: yes     Pain Management:   Patient states pain is manageable on current regimen: N/A, nonverbal     Skin:  Loco Score: 14  Interventions: float heels, internal/external urinary devices and nutritional support     Patient Safety:  Fall Score: Total Score: 3  Interventions: bed/chair alarm  High Fall Risk: Yes  @Rollbelt  @dexterity to release roll belt  Yes/No ( must document dexterity  here by stating Yes or No here, otherwise this is a restraint and must follow restraint documentation policy.)     DVT prophylaxis:  DVT prophylaxis Med- Yes  DVT prophylaxis SCD or ESVIN- No      Wounds: (If Applicable)  Wounds- No  Location      Active Consults:  IP CONSULT TO NEUROLOGY  IP CONSULT TO NEPHROLOGY     Length of Stay:  Expected LOS: - - -  Actual LOS: 2  Discharge Plan: No TBD        Kris Smith RN                                        Revision History

## 2022-01-04 LAB
ALBUMIN SERPL-MCNC: 2.8 G/DL (ref 3.5–5)
ANION GAP SERPL CALC-SCNC: 9 MMOL/L (ref 5–15)
BUN SERPL-MCNC: 42 MG/DL (ref 6–20)
BUN/CREAT SERPL: 8 (ref 12–20)
CALCIUM SERPL-MCNC: 8.7 MG/DL (ref 8.5–10.1)
CHLORIDE SERPL-SCNC: 91 MMOL/L (ref 97–108)
CO2 SERPL-SCNC: 32 MMOL/L (ref 21–32)
CREAT SERPL-MCNC: 5.42 MG/DL (ref 0.7–1.3)
GLUCOSE BLD STRIP.AUTO-MCNC: 114 MG/DL (ref 65–117)
GLUCOSE BLD STRIP.AUTO-MCNC: 262 MG/DL (ref 65–117)
GLUCOSE BLD STRIP.AUTO-MCNC: 268 MG/DL (ref 65–117)
GLUCOSE BLD STRIP.AUTO-MCNC: 97 MG/DL (ref 65–117)
GLUCOSE SERPL-MCNC: 95 MG/DL (ref 65–100)
PHOSPHATE SERPL-MCNC: 4.6 MG/DL (ref 2.6–4.7)
POTASSIUM SERPL-SCNC: 3.7 MMOL/L (ref 3.5–5.1)
SERVICE CMNT-IMP: ABNORMAL
SERVICE CMNT-IMP: ABNORMAL
SERVICE CMNT-IMP: NORMAL
SERVICE CMNT-IMP: NORMAL
SODIUM SERPL-SCNC: 132 MMOL/L (ref 136–145)

## 2022-01-04 PROCEDURE — 97535 SELF CARE MNGMENT TRAINING: CPT | Performed by: OCCUPATIONAL THERAPIST

## 2022-01-04 PROCEDURE — 74011636637 HC RX REV CODE- 636/637: Performed by: STUDENT IN AN ORGANIZED HEALTH CARE EDUCATION/TRAINING PROGRAM

## 2022-01-04 PROCEDURE — 74011636637 HC RX REV CODE- 636/637: Performed by: HOSPITALIST

## 2022-01-04 PROCEDURE — 74011250637 HC RX REV CODE- 250/637: Performed by: PSYCHIATRY & NEUROLOGY

## 2022-01-04 PROCEDURE — 36415 COLL VENOUS BLD VENIPUNCTURE: CPT

## 2022-01-04 PROCEDURE — 97116 GAIT TRAINING THERAPY: CPT

## 2022-01-04 PROCEDURE — 65660000000 HC RM CCU STEPDOWN

## 2022-01-04 PROCEDURE — 2709999900 HC NON-CHARGEABLE SUPPLY

## 2022-01-04 PROCEDURE — 97161 PT EVAL LOW COMPLEX 20 MIN: CPT

## 2022-01-04 PROCEDURE — 82962 GLUCOSE BLOOD TEST: CPT

## 2022-01-04 PROCEDURE — 74011250637 HC RX REV CODE- 250/637: Performed by: HOSPITALIST

## 2022-01-04 PROCEDURE — 97162 PT EVAL MOD COMPLEX 30 MIN: CPT

## 2022-01-04 PROCEDURE — 80069 RENAL FUNCTION PANEL: CPT

## 2022-01-04 PROCEDURE — 74011250637 HC RX REV CODE- 250/637: Performed by: STUDENT IN AN ORGANIZED HEALTH CARE EDUCATION/TRAINING PROGRAM

## 2022-01-04 PROCEDURE — 74011250637 HC RX REV CODE- 250/637: Performed by: INTERNAL MEDICINE

## 2022-01-04 PROCEDURE — 90935 HEMODIALYSIS ONE EVALUATION: CPT

## 2022-01-04 PROCEDURE — 97165 OT EVAL LOW COMPLEX 30 MIN: CPT | Performed by: OCCUPATIONAL THERAPIST

## 2022-01-04 RX ORDER — ASPIRIN 325 MG/1
100 TABLET, FILM COATED ORAL DAILY
Status: DISCONTINUED | OUTPATIENT
Start: 2022-01-04 | End: 2022-01-22 | Stop reason: HOSPADM

## 2022-01-04 RX ORDER — BUTALBITAL, ACETAMINOPHEN AND CAFFEINE 50; 325; 40 MG/1; MG/1; MG/1
1 TABLET ORAL
Status: DISCONTINUED | OUTPATIENT
Start: 2022-01-04 | End: 2022-01-22 | Stop reason: HOSPADM

## 2022-01-04 RX ORDER — LANOLIN ALCOHOL/MO/W.PET/CERES
3 CREAM (GRAM) TOPICAL
Status: DISCONTINUED | OUTPATIENT
Start: 2022-01-04 | End: 2022-01-22 | Stop reason: HOSPADM

## 2022-01-04 RX ADMIN — LEVETIRACETAM 500 MG: 500 TABLET, FILM COATED ORAL at 12:36

## 2022-01-04 RX ADMIN — Medication 3 UNITS: at 12:36

## 2022-01-04 RX ADMIN — BUMETANIDE 1 MG: 1 TABLET ORAL at 09:04

## 2022-01-04 RX ADMIN — ACETAMINOPHEN 650 MG: 325 TABLET ORAL at 22:05

## 2022-01-04 RX ADMIN — MELATONIN 3 MG: at 22:05

## 2022-01-04 RX ADMIN — Medication 2 UNITS: at 22:04

## 2022-01-04 RX ADMIN — AMLODIPINE BESYLATE 5 MG: 5 TABLET ORAL at 09:04

## 2022-01-04 RX ADMIN — BUTALBITAL, ACETAMINOPHEN, AND CAFFEINE 1 TABLET: 50; 325; 40 TABLET ORAL at 18:21

## 2022-01-04 RX ADMIN — ACETAMINOPHEN 650 MG: 325 TABLET ORAL at 11:03

## 2022-01-04 RX ADMIN — Medication 15 UNITS: at 09:00

## 2022-01-04 RX ADMIN — LACOSAMIDE 100 MG: 50 TABLET, FILM COATED ORAL at 01:38

## 2022-01-04 RX ADMIN — LEVETIRACETAM 500 MG: 500 TABLET, FILM COATED ORAL at 01:39

## 2022-01-04 RX ADMIN — Medication 100 MG: at 11:01

## 2022-01-04 NOTE — PROGRESS NOTES
End of Shift Note    Bedside shift change report given to Alicia Parham RN (oncoming nurse) by Yazmin Bolanos RN (offgoing nurse). Report included the following information SBAR, Kardex and Recent Results    Shift worked: 7a-7p   Shift summary and any significant changes:    Patient had dialysis. Concerns for physician to address: None   Zone phone for oncoming shift:  [phone]     Patient Information  Jaspal Gupta [address]  49 y.o.  12/28/2021 11:05 PM by Jason Sage MD. Shi Torres was admitted from the [facility]. Problem List  Patient Active Problem List    Diagnosis Date Noted    Status epilepticus ([address] 12/28/2021    Aspiration pneumonia ([address] 12/28/2021    ESRD needing dialysis ([address] 12/28/2021    AMS (altered mental status) 12/28/2021    ATN (acute tubular necrosis) ([address] 12/25/2021    Interstitial nephritis determined by biopsy 12/25/2021    Thrombocytopenia ([address] 12/25/2021    Hypertension 12/25/2021    Sepsis ([address] 12/25/2021    Acute metabolic encephalopathy 72/58/2850    Hypoglycemia 11/25/2021    EUSEBIA (acute kidney injury) ([address] 75/65/5890    Metabolic acidemia 01/04/8592    Intractable nausea and vomiting 11/25/2021    Hyperglycemia due to type 2 diabetes mellitus ([address] 05/05/2019    Hyperosmolar non-ketotic state in patient with type 2 diabetes mellitus ([address] 12/16/2018     Past Medical History:   Diagnosis Date    Asthma     Diabetes ([address]     High cholesterol     Seizure ([address]        Core Measures:  CVA: No No  CHF:Not applicable No  PNA:Not applicable Not applicable    Activity:  Activity Level: Up with Assistance  Number times ambulated in hallways past shift: 0  Number of times OOB to chair past shift: 1    Cardiac:   Cardiac Monitoring: No      Cardiac Rhythm: Sinus Rhythm    Access:   Current line(s): PIV and HD access  Central Line? No   PICC LINE? No    Genitourinary:   Urinary status: bal  Urinary Catheter?  Yes    Respiratory:   O2 Device: None (Room air)  Chronic home O2 use?: No  Incentive spirometer at bedside: N/A       GI:  Last Bowel Movement Date: 01/01/22  Current diet:  ADULT DIET Easy to Chew  Passing flatus: YES  Tolerating current diet: YES       Pain Management:   Patient states pain is manageable on current regimen: YES    Skin:  Loco Score: 16  Interventions: increase time out of bed and nutritional support     Patient Safety:  Fall Score:  Total Score: 3  Interventions: bed/chair alarm, assistive device (walker, cane, etc), gripper socks, pt to call before getting OOB and stay with me (per policy)  High Fall Risk: Yes    DVT prophylaxis:  DVT prophylaxis Med- Yes  DVT prophylaxis SCD or ESVIN- No     Wounds: (If Applicable)  Wounds- No    Active Consults:  IP CONSULT TO NEUROLOGY  IP CONSULT TO NEPHROLOGY  IP CONSULT TO PSYCHIATRY    Length of Stay:  Expected LOS: 4d 4h  Actual LOS: 7  Discharge Plan: 1/10/22      Juan Jose Morley RN

## 2022-01-04 NOTE — PROGRESS NOTES
Transition of Care Plan:     RUR: 15%  Disposition: TBD   Follow up appointments: PCP, specialists as indicated  DME needed: TBD   Transportation at Discharge: TBD   Keys or means to access home:  N/A    IM Medicare Letter: N/A, has Medicaid   Is patient a BCPI-A Bundle: No                   If yes, was Bundle Letter given?:    Is patient a  and connected with the [facility] [address]? Unknown at this time   If yes, was [employer] transfer form completed and [facility] notified? Caregiver Contact: TBD   Discharge Caregiver contacted prior to discharge? Chart reviewed. CM continuing to follow for discharge planning. Attempted to meet with pt at bedside today to discuss discharge planning. Pt is currently JOHNY for dialysis. Will re-attempt visit at a later time. Pt participated well with PT/OT today. Current recommendation is IPR placement at discharge. Pt will require insurance authorization for placement if he is agreeable. Awaiting to confirm need for OP HD chair after discharge. Will continue to follow.     Ayden Boyer MSW   Care Manager, [address]  [phone]

## 2022-01-04 NOTE — PROGRESS NOTES
Received notification from bedside RN about patient with regards to: requesting medication to help with sleep  VS: /77, HR 89, RR 16, O2 sat 98% on RA    Intervention given: Tylenol PM PO x 1 dose ordered

## 2022-01-04 NOTE — PROGRESS NOTES
Problem: Falls - Risk of  Goal: *Absence of Falls  Description: Document Rut Loza Fall Risk and appropriate interventions in the flowsheet. Outcome: Progressing Towards Goal  Note: Fall Risk Interventions:  Mobility Interventions: Bed/chair exit alarm,Patient to call before getting OOB    Mentation Interventions: Adequate sleep, hydration, pain control,Bed/chair exit alarm,Door open when patient unattended,Toileting rounds    Medication Interventions: Bed/chair exit alarm,Patient to call before getting OOB,Teach patient to arise slowly    Elimination Interventions: Bed/chair exit alarm,Call light in reach,Patient to call for help with toileting needs,Stay With Me (per policy),Toileting schedule/hourly rounds              Problem: Pressure Injury - Risk of  Goal: *Prevention of pressure injury  Description: Document Loco Scale and appropriate interventions in the flowsheet.   Outcome: Progressing Towards Goal  Note: Pressure Injury Interventions:  Sensory Interventions: Assess changes in LOC,Check visual cues for pain,Keep linens dry and wrinkle-free,Minimize linen layers    Moisture Interventions: Absorbent underpads,Apply protective barrier, creams and emollients    Activity Interventions: Increase time out of bed,Pressure redistribution bed/mattress(bed type)    Mobility Interventions: Pressure redistribution bed/mattress (bed type)    Nutrition Interventions: Offer support with meals,snacks and hydration    Friction and Shear Interventions: Apply protective barrier, creams and emollients,Feet elevated on foot rest,Minimize layers                Problem: Seizure Disorder (Adult)  Goal: *STG: Remains free of seizure activity  Outcome: Progressing Towards Goal  Goal: *STG: Maintains lab values within therapeutic range  Outcome: Progressing Towards Goal  Goal: *STG/LTG: Complies with medication therapy  Outcome: Progressing Towards Goal  Goal: *STG: Remains free of injury during seizure activity  Outcome: Progressing Towards Goal  Goal: *STG: Remains safe in hospital  Outcome: Progressing Towards Goal     Problem: Patient Education: Go to Patient Education Activity  Goal: Patient/Family Education  Outcome: Progressing Towards Goal
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
## 2022-01-04 NOTE — PROGRESS NOTES
Problem: Falls - Risk of  Goal: *Absence of Falls  Description: Document Juan Blight Fall Risk and appropriate interventions in the flowsheet.   Outcome: Progressing Towards Goal  Note: Fall Risk Interventions:  Mobility Interventions: Bed/chair exit alarm    Mentation Interventions: Adequate sleep, hydration, pain control    Medication Interventions: Bed/chair exit alarm    Elimination Interventions: Bed/chair exit alarm

## 2022-01-04 NOTE — PROGRESS NOTES
Nephrology Progress Note  Dorian Antonio     [email or web address]. [email or web address]  Phone - [phone]   Patient: Linda Villeda    YOB: 1965        Date- 1/4/2022   Admit Date: 12/28/2021  CC: Follow up for  EUSEBIA    IMPRESSION & PLAN:    EUSEBIA ON HD - on dialysis MWF with [facility]   Metabolic encephalopathy   History of hypoglycemia   Interstitial nephritis determined by biopsy NSAID induced   Hypertension   Sepsis   Anemia    PLAN-   Hd today   Continue norvasc   Stop bumex            Subjective: Interval History:   -no labs done today  Patient was seen and examined   bp stable      Objective:   Vitals:    01/04/22 0023 01/04/22 0455 01/04/22 0903 01/04/22 1115   BP: 130/83 127/84 126/83 130/75   Pulse: 92 81 86 88   Resp: 18 20 18 18   Temp: 97.5 °F (36.4 °C) 98.4 °F (36.9 °C) 97.2 °F (36.2 °C) 97.4 °F (36.3 °C)   TempSrc:       SpO2: 97% 97% 98% 99%   Weight:          01/03 0701 - 01/04 0700  In: 1280 [P.O.:1280]  Out: 1800 [Urine:1800]  Last 3 Recorded Weights in this Encounter    12/31/21 0634   Weight: 51 kg (112 lb 8 oz)      Physical exam:   GEN:  NAD  NECK:  Supple, no thyromegaly  RESP: Clear  b/l, no  wheezing,   CVS: RRR,S1,S2   NEURO: non focal, normal speech  EXT:no  Edema +nt       Right permcath      Chart reviewed. Pertinent Notes reviewed. Data Review :  Recent Labs     01/04/22  0141 01/02/22  0105   * 133*   K 3.7 3.4*   CL 91* 95*   CO2 32 30   BUN 42* 28*   CREA 5.42* 3.63*   GLU 95 70   CA 8.7 8.2*   PHOS 4.6  --      No results for input(s): WBC, HGB, HCT, PLT, HGBEXT, HCTEXT, PLTEXT, HGBEXT, HCTEXT, PLTEXT in the last 72 hours. No results for input(s): FE, TIBC, PSAT, FERR in the last 72 hours.    Medication list  reviewed  Current Facility-Administered Medications   Medication Dose Route Frequency    melatonin tablet 3 mg  3 mg Oral QHS    thiamine mononitrate (B-1) tablet 100 mg  100 mg Oral DAILY    butalbital-acetaminophen-caffeine (FIORICET, ESGIC) -40 mg per tablet 1 Tablet  1 Tablet Oral Q6H PRN    bumetanide (BUMEX) tablet 1 mg  1 mg Oral DAILY    amLODIPine (NORVASC) tablet 5 mg  5 mg Oral DAILY    heparin (porcine) 1,000 unit/mL injection 1,700 Units  1,700 Units InterCATHeter DIALYSIS PRN    And    heparin (porcine) 1,000 unit/mL injection 1,600 Units  1,600 Units InterCATHeter DIALYSIS PRN    levETIRAcetam (KEPPRA) tablet 500 mg  500 mg Oral BID    lacosamide (VIMPAT) tablet 100 mg  100 mg Oral Q12H    insulin glargine (LANTUS) injection 15 Units  15 Units SubCUTAneous DAILY    hydrALAZINE (APRESOLINE) 20 mg/mL injection 10 mg  10 mg IntraVENous Q6H PRN    ondansetron (ZOFRAN ODT) tablet 4 mg  4 mg Oral Q8H PRN    Or    ondansetron (ZOFRAN) injection 4 mg  4 mg IntraVENous Q6H PRN    acetaminophen (TYLENOL) tablet 650 mg  650 mg Oral Q4H PRN    Or    acetaminophen (TYLENOL) solution 650 mg  650 mg Per NG tube Q4H PRN    Or    acetaminophen (TYLENOL) suppository 650 mg  650 mg Rectal Q4H PRN    insulin lispro (HUMALOG) injection   SubCUTAneous AC&HS    glucose chewable tablet 16 g  4 Tablet Oral PRN    dextrose (D50W) injection syrg 12.5-25 g  12.5-25 g IntraVENous PRN    glucagon (GLUCAGEN) injection 1 mg  1 mg IntraMUSCular PRN          Collette Reach, MD              [facility]  [facility] / [facility] [place] [place] [address], [place] Felix  [place], [address]  Phone - [phone]               Fax - [phone]

## 2022-01-04 NOTE — PROCEDURES
Hemodialysis / [phone]    Vitals Pre Post Assessment Pre Post   BP BP: 125/80 (01/04/22 1405) 127/87 LOC A&O x2 A&O x2   HR Pulse (Heart Rate): 80 (01/04/22 1405) 78   Lungs Clear Clear   Resp Resp Rate: 16 (01/04/22 1405) 16 Cardiac Reg S1 S2 Reg S1 S2   Temp Temp: 97.7 °F (36.5 °C) (01/04/22 1405) 127/87 Skin Stage 1 sacrum Stage 1 sacrum   Weight  n/a n/a Edema none none   Tele status none none Pain 0 0     Orders   Duration: Start: 9974 End: 1735 Total: 3.5hr   Dialyzer: Dialyzer/Set Up Inspection: Radha Garcia (01/04/22 1405)   K Bath: Dialysate K (mEq/L): 3 (01/04/22 1405)   Ca Bath: Dialysate CA (mEq/L): 2.5 (01/04/22 1405)   Na: Dialysate NA (mEq/L): 138 (01/04/22 1405)   Bicarb: Dialysate HCO3 (mEq/L): 35 (01/04/22 1405)   Target Fluid Removal: Goal/Amount of Fluid to Remove (mL): 0 mL (01/04/22 1405)     Access   Type & Location: [facility]   Comments:  Dressing CDI. No s/s of infection. Both lumens aspirate & flush well. Running well at .                                     Labs   HBsAg (Antigen) / date: Neg AG 12/25/21                                              HBsAb (Antibody) / date: <10 Ab 12/25/21   Source: EPIC   Obtained/Reviewed  Critical Results Called HGB   Date Value Ref Range Status   01/01/2022 10.1 (L) 12.1 - 17.0 g/dL Final     Potassium   Date Value Ref Range Status   01/04/2022 3.7 3.5 - 5.1 mmol/L Final     Calcium   Date Value Ref Range Status   01/04/2022 8.7 8.5 - 10.1 MG/DL Final     BUN   Date Value Ref Range Status   01/04/2022 42 (H) 6 - 20 MG/DL Final     Creatinine   Date Value Ref Range Status   01/04/2022 5.42 (H) 0.70 - 1.30 MG/DL Final     Comment:     INVESTIGATED PER DELTA CHECK PROTOCOL        Meds Given   Name Dose Route   Heparin 1:1000 1.6ml & 1.7ml Dwell in CVC after HD               Adequacy / Fluid    Total Liters Process: 75.9L   Net Fluid Removed: 0ml      Comments   Time Out Done:   (Time) 1403   Admitting Diagnosis: Hypoglycemia/ Hx epilepsy/ AMS/ EUSEBIA Consent obtained/signed: Informed Consent Verified: Yes (01/04/22 3907)   Machine / Irina Lorimor # Machine Number: [id number] (01/04/22 7024)   Primary Nurse Rpt Pre: Yumi Butler RN   Primary Nurse Rpt Post: Yumi Butler RN   Pt Education: procedural   Care Plan: ongoing   Pts outpatient clinic: n/a     Tx Summary   Comments:   SBAR received from Primary RN. Pt arrived to HD suite A&Ox4. Consent signed & on file. 0800: Each catheter limb disinfected per p&p, caps removed, hubs disinfected per p&p. Each lumen aspirated for blood return and flushed with Normal Saline per policy. VSS. Dialysis Tx initiated. * no issues with Hd treatment*  1130: Tx ended. VSS. Each dialysis catheter limb disinfected per p&p, all possible blood returned per p&p, and each dialysis hub disinfected per p&p. Each lumen flushed, post dialysis catheter Heparin dwell instilled per order, and caps applied. Bed locked and in the lowest position, call bell and belongings in reach. SBAR given to Primary, RN. Patient is stable at time of their departure. All Dialysis related medications have been reviewed.

## 2022-01-04 NOTE — PROGRESS NOTES
Comprehensive Nutrition Assessment    Type and Reason for Visit: Initial,RD nutrition re-screen/LOS    Nutrition Recommendations/Plan:   · Continue diet as tolerated per SLP. · Continue supplemental thiamine. · Please document % meals and supplements consumed in flowsheet I/O's under intake. Nutrition Assessment:     1/4: Chart reviewed; med noted for AMS; hx of ESRD-HD. Currently tolerating an easy to chew diet with po intake mostly % of meals. Pt has thiamine ordered daily. No additional nutrition needs identified at this time. Patient Vitals for the past 168 hrs:   % Diet Eaten   01/04/22 0903 51 - 75%   01/03/22 1639 51 - 75%   01/01/22 1907 76 - 100%   12/31/21 1737 26 - 50%   12/31/21 1444 51 - 75%     Last Weight Metric  Weight Loss Metrics 12/31/2021 12/28/2021 12/15/2021 4/20/2020 5/5/2019 4/25/2019 4/23/2019   Today's Wt 112 lb 8 oz 116 lb 159 lb 13.3 oz 120 lb 95 lb 100 lb 1.4 oz 100 lb   BMI 20.58 kg/m2 21.22 kg/m2 29.23 kg/m2 20.6 kg/m2 16.31 kg/m2 17.18 kg/m2 17.16 kg/m2     Estimated Daily Nutrient Needs:  Energy (kcal): 1600 (BMR 1229 x 1. 3AF);  Weight Used for Energy Requirements: Current  Protein (g): 51-61 (1.0-1.2 g/kg bw); Weight Used for Protein Requirements: Current  Fluid (ml/day): 1600 ml/day; Method Used for Fluid Requirements: 1 ml/kcal    Nutrition Related Findings:  BM: 1/1; Labs: Creat 5.42, Na+ 132; Meds: reviewed      Wounds:    None       Current Nutrition Therapies:  ADULT DIET Easy to Chew    Anthropometric Measures:  · Height:  5' 2\" (157.5 cm)  · Current Body Wt:  51 kg (112 lb 7 oz)   · Ideal Body Wt:  118 lbs:  95.3 %     Nutrition Diagnosis:   No nutrition diagnosis at this time     Nutrition Interventions:   Food and/or Nutrient Delivery: Continue current diet  Nutrition Education and Counseling: No recommendations at this time  Coordination of Nutrition Care: Continue to monitor while inpatient    Goals:  Maintain PO intake at least 50% of meals next 5-7 days Nutrition Monitoring and Evaluation:   Behavioral-Environmental Outcomes: None identified  Food/Nutrient Intake Outcomes: Food and nutrient intake  Physical Signs/Symptoms Outcomes: Biochemical data,Chewing or swallowing,Weight,Skin    Discharge Planning:    Continue current diet     Electronically signed by Juan A Key RD on 1/4/2022 at 12:41 PM

## 2022-01-04 NOTE — PROGRESS NOTES
Hospitalist Progress Note    NAME: Arely Quintero   :  1965   MRN:  [id number]       Assessment / Plan:  Acute metabolic encephalopathy  Status epilepticus  History of seizure disorder  Aspiration pneumonia  MRI brainno acute abnormality. CT headno acute abnormality. Lumbar puncture doneCSF reveals no infectious etiology so far. CSF culture negative. CSF HSV negative so we will discontinue the acyclovir. CSF meningitis panel negative. Out side hospital EEG indication triphasic slowing and focal 3hz right fronto temproal field siezure activety. EEG done here shows no seizure. On continuous EEGno more seizure reported on the continuous EEG. Neurology on boardappreciate input. Continue Keppra along with Vimpat. Seizure precaution. Chest x-rayno acute pathology.  -Treated the course of Levaquin today. WBC stable, procalcitonin 0.47. Repeat blood culture negative. Consulted psychiatryrecommend Haldol as needed. Mental status may be the new baseline, will follow closely. Awaiting further neurology recommendations    End-stage renal disease on dialysis  Nephrology was consultedhemodialysis per nephrology. Dialysis days changed today to Tuesday, Thursday and Saturday  Creatinine elevated, dialysis per nephrology. Having good amount of urine output. Migraine  Started on Fioricet as needed. Hypertension  Diabetes type 2  Hyperlipidemia  Polysubstance abuse  Sliding scale insulin along with Lantus. Body mass index is 20.58 kg/m². Estimated discharge date:   Barriers:    Code status: Full  Prophylaxis: Lovenox  Recommended Disposition:      Subjective:     Chief Complaint / Reason for Physician Visit  Patient seen and examined today, complaining of the headache, no other complaints. Objective:     VITALS:   Last 24hrs VS reviewed since prior progress note.  Most recent are:  Patient Vitals for the past 24 hrs:   Temp Pulse Resp BP SpO2   01/04/22 1219  (P) 97  (P) 139/88    01/04/22 1215  87  (!) 133/91 97 %   01/04/22 1115 97.4 °F (36.3 °C) 88 18 130/75 99 %   01/04/22 0903 97.2 °F (36.2 °C) 86 18 126/83 98 %   01/04/22 0455 98.4 °F (36.9 °C) 81 20 127/84 97 %   01/04/22 0023 97.5 °F (36.4 °C) 92 18 130/83 97 %   01/03/22 2035 98.7 °F (37.1 °C) 89 16 118/77 98 %   01/03/22 1526 98.6 °F (37 °C) 85 18  98 %       Intake/Output Summary (Last 24 hours) at 1/4/2022 1350  Last data filed at 1/4/2022 2545  Gross per 24 hour   Intake 1040 ml   Output 1800 ml   Net -760 ml        I had a face to face encounter and independently examined this patient on 1/4/2022, as outlined below:  PHYSICAL EXAM:  General: WD, WN. Awake, not alert, cooperative, no acute distress    EENT:  EOMI. Anicteric sclerae. MMM  Resp:  CTA bilaterally, no wheezing or rales. No accessory muscle use  CV:  Regular  rhythm,  No edema  GI:  Soft, Non distended, Non tender. +Bowel sounds  Neurologic:  Awake but confused, normal speech,   Psych:   Good insight. Not anxious nor agitated  Skin:  No rashes. No jaundice    Reviewed most current lab test results and cultures  YES  Reviewed most current radiology test results   YES  Review and summation of old records today    NO  Reviewed patient's current orders and MAR    YES  PMH/ reviewed - no change compared to H&P  ________________________________________________________________________  Care Plan discussed with:    Comments   Patient x    Family      RN x    Care Manager     Consultant                       x Multidiciplinary team rounds were held today with , nursing, pharmacist and clinical coordinator. Patient's plan of care was discussed; medications were reviewed and discharge planning was addressed.      ________________________________________________________________________  Total NON critical care TIME:  32  Minutes    Total CRITICAL CARE TIME Spent:   Minutes non procedure based      Comments   >50% of visit spent in counseling and coordination of care     ________________________________________________________________________  Alma Delia Hurtado MD     Procedures: see electronic medical records for all procedures/Xrays and details which were not copied into this note but were reviewed prior to creation of Plan. LABS:  I reviewed today's most current labs and imaging studies. Pertinent labs include:  No results for input(s): WBC, HGB, HCT, PLT, HGBEXT, HCTEXT, PLTEXT, HGBEXT, HCTEXT, PLTEXT in the last 72 hours.   Recent Labs     01/04/22  0141 01/02/22  0105   * 133*   K 3.7 3.4*   CL 91* 95*   CO2 32 30   GLU 95 70   BUN 42* 28*   CREA 5.42* 3.63*   CA 8.7 8.2*   PHOS 4.6  --    ALB 2.8*  --        Signed: Alma Delia Hurtado MD

## 2022-01-04 NOTE — PROGRESS NOTES
Update 6127  Received return call from [facility]" Nash Bowman, patient sister [phone] who resides in [place]. Ms. Nash Bowman expresses that she and her sister are close with patient and she is willing to try and help with discharge planning. Expresses that patient has had a \"hard\" life but she has tried to remain in contact. Reviewed with Ms. Nash Bowman that patient is in need of rehab services, but needs to have a plan after rehab. Ms. Nash Bowman states that if there are no other options, patient would be able to stay with her. She states that much will depend on patient's care needs as she has grand-children in her home. Also expressed that patient is \"my brother\" and she feels a need to take care of family. Will continue to follow and ask Attending to call Ms. Nash Bowman to provide medical information. Transition of Care Plan:    RUR:16%  LOS:  7 Days  Disposition:  Rehab vs. LTC vs. Adult Home   Follow up appointments:   PCP, Specialists  DME needed:   On-going assessment of disposition needs. Transportation at Discharge:    [address] [place] [address] Avenue or means to access home:   N/A     IM Medicare Letter:   N/A;  [place] Medicaid  Is patient a BCPI-A Bundle:   N/A        If yes, was Bundle Letter given?:    Is patient a [place] and connected with the [place]? N/A  If yes, was [employer] transfer form completed and [facility] notified? Caregiver Contact:     Discharge Caregiver contacted prior to discharge? Received list of possible family members from  and initiated calls today. Spoke with patient's nephew Arnav Males [phone]) who wants to discuss patient with nephew's mother. Patient has a brother, sister and another sister who is incarcerated. Mother is  and patient did not have any children. Because of tenuous circumstances, not sure if family will be involved. Nephew agreed to return this writer's call tomorrow after speaking with his other family members.      Will follow up tomorrow for additional information and possible support for patient post discharge from possible IPR placement.     7 YAEL Fischer, CCM, ACM-SW  Complex CM/   [phone]
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
## 2022-01-05 LAB
ALBUMIN SERPL-MCNC: 2.7 G/DL (ref 3.5–5)
ANION GAP SERPL CALC-SCNC: 7 MMOL/L (ref 5–15)
BUN SERPL-MCNC: 32 MG/DL (ref 6–20)
BUN/CREAT SERPL: 8 (ref 12–20)
CALCIUM SERPL-MCNC: 8.8 MG/DL (ref 8.5–10.1)
CHLORIDE SERPL-SCNC: 96 MMOL/L (ref 97–108)
CO2 SERPL-SCNC: 29 MMOL/L (ref 21–32)
CREAT SERPL-MCNC: 3.96 MG/DL (ref 0.7–1.3)
GLUCOSE BLD STRIP.AUTO-MCNC: 169 MG/DL (ref 65–117)
GLUCOSE BLD STRIP.AUTO-MCNC: 306 MG/DL (ref 65–117)
GLUCOSE BLD STRIP.AUTO-MCNC: 396 MG/DL (ref 65–117)
GLUCOSE BLD STRIP.AUTO-MCNC: 91 MG/DL (ref 65–117)
GLUCOSE SERPL-MCNC: 149 MG/DL (ref 65–100)
PHOSPHATE SERPL-MCNC: 4 MG/DL (ref 2.6–4.7)
POTASSIUM SERPL-SCNC: 3.6 MMOL/L (ref 3.5–5.1)
SERVICE CMNT-IMP: ABNORMAL
SERVICE CMNT-IMP: NORMAL
SODIUM SERPL-SCNC: 132 MMOL/L (ref 136–145)

## 2022-01-05 PROCEDURE — 74011250637 HC RX REV CODE- 250/637: Performed by: STUDENT IN AN ORGANIZED HEALTH CARE EDUCATION/TRAINING PROGRAM

## 2022-01-05 PROCEDURE — 92526 ORAL FUNCTION THERAPY: CPT

## 2022-01-05 PROCEDURE — 36415 COLL VENOUS BLD VENIPUNCTURE: CPT

## 2022-01-05 PROCEDURE — 74011250637 HC RX REV CODE- 250/637: Performed by: PSYCHIATRY & NEUROLOGY

## 2022-01-05 PROCEDURE — 65660000000 HC RM CCU STEPDOWN

## 2022-01-05 PROCEDURE — 97530 THERAPEUTIC ACTIVITIES: CPT | Performed by: OCCUPATIONAL THERAPIST

## 2022-01-05 PROCEDURE — 74011636637 HC RX REV CODE- 636/637: Performed by: HOSPITALIST

## 2022-01-05 PROCEDURE — 97535 SELF CARE MNGMENT TRAINING: CPT | Performed by: OCCUPATIONAL THERAPIST

## 2022-01-05 PROCEDURE — 97116 GAIT TRAINING THERAPY: CPT

## 2022-01-05 PROCEDURE — 82962 GLUCOSE BLOOD TEST: CPT

## 2022-01-05 PROCEDURE — 80069 RENAL FUNCTION PANEL: CPT

## 2022-01-05 PROCEDURE — 74011636637 HC RX REV CODE- 636/637: Performed by: STUDENT IN AN ORGANIZED HEALTH CARE EDUCATION/TRAINING PROGRAM

## 2022-01-05 RX ADMIN — Medication 9 UNITS: at 17:42

## 2022-01-05 RX ADMIN — Medication 4 UNITS: at 11:02

## 2022-01-05 RX ADMIN — MELATONIN 3 MG: at 21:20

## 2022-01-05 RX ADMIN — LEVETIRACETAM 500 MG: 500 TABLET, FILM COATED ORAL at 17:16

## 2022-01-05 RX ADMIN — Medication 100 MG: at 10:55

## 2022-01-05 RX ADMIN — LACOSAMIDE 100 MG: 50 TABLET, FILM COATED ORAL at 17:15

## 2022-01-05 RX ADMIN — AMLODIPINE BESYLATE 5 MG: 5 TABLET ORAL at 10:55

## 2022-01-05 RX ADMIN — Medication 15 UNITS: at 11:02

## 2022-01-05 RX ADMIN — LEVETIRACETAM 500 MG: 500 TABLET, FILM COATED ORAL at 01:12

## 2022-01-05 RX ADMIN — LACOSAMIDE 100 MG: 50 TABLET, FILM COATED ORAL at 01:12

## 2022-01-05 NOTE — PROGRESS NOTES
Nephrology Progress Note  Dorian Antonio     www. [facility] [place]  Phone - [phone]   Patient: Jj Post    YOB: 1965        Date- 1/5/2022   Admit Date: 12/28/2021  CC: Follow up for  khadra  IMPRESSION & PLAN:    KHADRA  ON HD - on dialysis MWF with [facility]   Metabolic encephalopathy   History of hypoglycemia   Interstitial nephritis determined by biopsy NSAID induced   Hypertension   Sepsis   Anemia    PLAN-   No dialysis today   Continue norvasc   Continue hd tts schedule   Check cbc         Subjective: Interval History:   bp stable  S/p hd yesterday      Objective:   Vitals:    01/04/22 1735 01/04/22 2053 01/05/22 0430 01/05/22 0852   BP: 127/87 122/70 (!) 145/91 118/77   Pulse: 78 84 74 87   Resp: 16 17 18 16   Temp: 97.5 °F (36.4 °C) 98.5 °F (36.9 °C) 97.8 °F (36.6 °C) 98.5 °F (36.9 °C)   TempSrc: Oral      SpO2:   98% 100%   Weight:       Height:          01/04 0701 - 01/05 0700  In: 360 [P.O.:360]  Out: 2800 [Urine:2800]  Last 3 Recorded Weights in this Encounter    12/31/21 0634   Weight: 51 kg (112 lb 8 oz)      Physical exam:   GEN:  NAD  NECK:  Supple, no thyromegaly  RESP: Clear  b/l, no  wheezing,   CVS: RRR,S1,S2   NEURO: non focal, normal speech  EXT:NO  Edema +nt     Right permcath      Chart reviewed. Pertinent Notes reviewed. Data Review :  Recent Labs     01/05/22  0543 01/04/22  0141   * 132*   K 3.6 3.7   CL 96* 91*   CO2 29 32   BUN 32* 42*   CREA 3.96* 5.42*   * 95   CA 8.8 8.7   PHOS 4.0 4.6     No results for input(s): WBC, HGB, HCT, PLT, HGBEXT, HCTEXT, PLTEXT, HGBEXT, HCTEXT, PLTEXT in the last 72 hours. No results for input(s): FE, TIBC, PSAT, FERR in the last 72 hours.    Medication list  reviewed  Current Facility-Administered Medications   Medication Dose Route Frequency    melatonin tablet 3 mg  3 mg Oral QHS    thiamine mononitrate (B-1) tablet 100 mg  100 mg Oral DAILY    butalbital-acetaminophen-caffeine (FIORICET, ESGIC) -40 mg per tablet 1 Tablet  1 Tablet Oral Q6H PRN    amLODIPine (NORVASC) tablet 5 mg  5 mg Oral DAILY    heparin (porcine) 1,000 unit/mL injection 1,700 Units  1,700 Units InterCATHeter DIALYSIS PRN    And    heparin (porcine) 1,000 unit/mL injection 1,600 Units  1,600 Units InterCATHeter DIALYSIS PRN    levETIRAcetam (KEPPRA) tablet 500 mg  500 mg Oral BID    lacosamide (VIMPAT) tablet 100 mg  100 mg Oral Q12H    insulin glargine (LANTUS) injection 15 Units  15 Units SubCUTAneous DAILY    hydrALAZINE (APRESOLINE) 20 mg/mL injection 10 mg  10 mg IntraVENous Q6H PRN    ondansetron (ZOFRAN ODT) tablet 4 mg  4 mg Oral Q8H PRN    Or    ondansetron (ZOFRAN) injection 4 mg  4 mg IntraVENous Q6H PRN    acetaminophen (TYLENOL) tablet 650 mg  650 mg Oral Q4H PRN    Or    acetaminophen (TYLENOL) solution 650 mg  650 mg Per NG tube Q4H PRN    Or    acetaminophen (TYLENOL) suppository 650 mg  650 mg Rectal Q4H PRN    insulin lispro (HUMALOG) injection   SubCUTAneous AC&HS    glucose chewable tablet 16 g  4 Tablet Oral PRN    dextrose (D50W) injection syrg 12.5-25 g  12.5-25 g IntraVENous PRN    glucagon (GLUCAGEN) injection 1 mg  1 mg IntraMUSCular PRN          Irma Anderson MD              [facility]  [facility] / [facility]  Dianne Sánchez, Lianet [address], [address]  Phone - [phone]               Fax - [phone]

## 2022-01-05 NOTE — PROGRESS NOTES
End of Shift Note    Bedside shift change report given to Bro Mcintosh, RN (oncoming nurse) by Nakia Stanton, GERARDO (offgoing nurse). Report included the following information SBAR, Kardex and Recent Results    Shift worked: Night    Shift summary and any significant changes:    Uneventful night        Concerns for physician to address: None   Zone phone for oncoming shift:  3045     Patient Information  Rosina Katz [address]  49 y.o.  12/28/2021 11:05 PM by Reyes Paterson, MD. Star Brewer was admitted from the [facility]. Problem List  Patient Active Problem List    Diagnosis Date Noted    Status epilepticus ([address] 12/28/2021    Aspiration pneumonia ([address] 12/28/2021    ESRD needing dialysis ([address] 12/28/2021    AMS (altered mental status) 12/28/2021    ATN (acute tubular necrosis) ([address] 12/25/2021    Interstitial nephritis determined by biopsy 12/25/2021    Thrombocytopenia ([address] 12/25/2021    Hypertension 12/25/2021    Sepsis ([address] 12/25/2021    Acute metabolic encephalopathy 72/84/9205    Hypoglycemia 11/25/2021    EUSEBIA (acute kidney injury) ([address] 83/82/3754    Metabolic acidemia 47/13/7202    Intractable nausea and vomiting 11/25/2021    Hyperglycemia due to type 2 diabetes mellitus ([address] 05/05/2019    Hyperosmolar non-ketotic state in patient with type 2 diabetes mellitus ([address] 12/16/2018     Past Medical History:   Diagnosis Date    Asthma     Diabetes ([address]     High cholesterol     Seizure ([address]        Core Measures:  CVA: No No  CHF:Not applicable No  PNA:Not applicable Not applicable    Activity:  Activity Level: Up with Assistance  Number times ambulated in hallways past shift: 0  Number of times OOB to chair past shift: 1    Cardiac:   Cardiac Monitoring: No      Cardiac Rhythm: Sinus Rhythm    Access:   Current line(s): PIV and HD access  Central Line? No   PICC LINE? No    Genitourinary:   Urinary status: bal  Urinary Catheter?  Yes    Respiratory:   O2 Device: None (Room air)  Chronic home O2 use?: No  Incentive spirometer at bedside: N/A       GI:  Last Bowel Movement Date: 01/01/22  Current diet:  ADULT DIET Easy to Chew  Passing flatus: YES  Tolerating current diet: YES       Pain Management:   Patient states pain is manageable on current regimen: YES    Skin:  Loco Score: 17  Interventions: increase time out of bed and nutritional support     Patient Safety:  Fall Score:  Total Score: 3  Interventions: bed/chair alarm, assistive device (walker, cane, etc), gripper socks, pt to call before getting OOB and stay with me (per policy)  High Fall Risk: Yes    DVT prophylaxis:  DVT prophylaxis Med- Yes  DVT prophylaxis SCD or ESVIN- No     Wounds: (If Applicable)  Wounds- No    Active Consults:  IP CONSULT TO NEUROLOGY  IP CONSULT TO NEPHROLOGY  IP CONSULT TO PSYCHIATRY    Length of Stay:  Expected LOS: 4d 4h  Actual LOS: 8  Discharge Plan: 1/10/22      Zoran Juarez, RN

## 2022-01-05 NOTE — PROGRESS NOTES
Problem: Mobility Impaired (Adult and Pediatric)  Goal: *Acute Goals and Plan of Care (Insert Text)  Description: FUNCTIONAL STATUS PRIOR TO ADMISSION: Patient was independent and active without use of DME.    HOME SUPPORT PRIOR TO ADMISSION: Just prior to admission, patient was incarcerated in retirement. Prior to retirement, the patient lived with 3 roommates but did not require assist.    Physical Therapy Goals  Initiated 1/4/2022  1. Patient will move from supine to sit and sit to supine , scoot up and down, and roll side to side in bed with modified independence within 7 day(s). 2.  Patient will transfer from bed to chair and chair to bed with modified independence using the least restrictive device within 7 day(s). 3.  Patient will perform sit to stand with modified independence within 7 day(s). 4.  Patient will ambulate with modified independence for 300 feet with the least restrictive device within 7 day(s). 5.  Patient will ascend/descend 4 stairs with single handrail(s) with supervision/set-up within 7 day(s). Outcome: Progressing Towards Goal   PHYSICAL THERAPY TREATMENT  Patient: Sujey Carballo (11 y.o. male)  Date: 1/5/2022  Diagnosis: AMS (altered mental status) [R41.82]  Status epilepticus ([facility] [G40.901]  Hypoglycemia [E16.2]  ESRD needing dialysis ([facility] [N18.6, Z99.2]  Aspiration pneumonia ([facility] [J69.0] <principal problem not specified>      Precautions: Fall  Chart, physical therapy assessment, plan of care and goals were reviewed. ASSESSMENT  Patient continues with skilled PT services and is progressing towards goals. Patient in chair upon arrival, agreeable to PT session. Patient was on the phone with his sister, tearful that he was able to reconnect with her. Completes transfers with SBA, cuing for proper hand placement. Demonstrates unsteadiness with initial stand requiring CGA for steadying. Ambulation 2x50ft with SPC CGA, cuing for proper sequencing and use of cane.  Demonstrates wide JOE during ambulation with generalized unsteadiness, reaching for furniture/wall to steady self though improves with distance. Requires several standing rest breaks during ambulation for recovery. Patient does c/o buttock pain with sitting in chair, provided patient with waffle cushion for improved comfort and decreased risk for pressure sore. Will continue to benefit from skilled PT services to address functional impairments. Recommend 3 hr therapy at least 5 days/week for best possible chance for patient to return to PLOF. Current Level of Function Impacting Discharge (mobility/balance): SBA transfers; CGA ambulation with SPC    Other factors to consider for discharge: impaired activity tolerance; patient very motivated         PLAN :  Patient continues to benefit from skilled intervention to address the above impairments. Continue treatment per established plan of care. to address goals. Recommendation for discharge: (in order for the patient to meet his/her long term goals)  Therapy 3 hours per day 5-7 days per week    This discharge recommendation:  Has been made in collaboration with the attending provider and/or case management    IF patient discharges home will need the following DME: to be determined (TBD)     SUBJECTIVE:   Patient stated I am ready to go to rehab.     OBJECTIVE DATA SUMMARY:   Critical Behavior:  Neurologic State: Alert,Confused  Orientation Level: Oriented to person,Oriented to place  Cognition: Decreased attention/concentration,Follows commands  Safety/Judgement: Fall prevention  Functional Mobility Training:  Transfers:  Sit to Stand: Stand-by assistance  Stand to Sit: Stand-by assistance  Bed to Chair: Contact guard assistance  Balance:  Sitting: Intact  Standing: Impaired  Standing - Static: Good  Standing - Dynamic : Fair;Constant support  Ambulation/Gait Training:  Distance (ft): 50 Feet (ft) (x2)  Assistive Device: Cane, straight;Gait belt  Ambulation - Level of Assistance: Contact guard assistance  Gait Abnormalities: Decreased step clearance;Trunk sway increased  Base of Support: Widened  Speed/Kristel: Pace decreased (<100 feet/min)    Pain Rating:  Reports generalized pain all over body, does not appear to affect mobility    Activity Tolerance:   Fair    After treatment patient left in no apparent distress:   Sitting in chair, Call bell within reach and Bed / chair alarm activated    COMMUNICATION/COLLABORATION:   The patients plan of care was discussed with: Occupational therapist and Registered nurse.      Mena Medina PT   Time Calculation: 16 mins

## 2022-01-05 NOTE — PROGRESS NOTES
Hospitalist Progress Note    NAME: Viv Baltazar   :  1965   MRN:  [id number]       Assessment / Plan:  Acute metabolic encephalopathy  Status epilepticus  History of seizure disorder  Aspiration pneumonia  Appears back to baseline  MRI brainno acute abnormality. CT headno acute abnormality. Lumbar puncture doneCSF reveals no infectious etiology so far. CSF culture negative. CSF HSV negative so we will discontinue the acyclovir. CSF meningitis panel negative. Out side hospital EEG indication triphasic slowing and focal 3hz right fronto temproal field siezure activety. EEG done here shows no seizure. On continuous EEGno more seizure reported on the continuous EEG. Neurology on boardappreciate input. Continue Keppra along with Vimpat. Seizure precaution. Chest x-rayno acute pathology.  -Treated the course of Levaquin   WBC stable, procalcitonin 0.47.  -c/w thiamine  Repeat blood culture negative. Consulted psychiatryrecommend Haldol as needed. Mental status may be the baseline, will follow closely  -CM was able to get a hold of sister    End-stage renal disease on dialysis  Anemia  Nephrology was consultedhemodialysis per nephrology. Dialysis days changed today to Tuesday, Thursday and Saturday  Creatinine elevated, dialysis per nephrology. Will need HD set up as outpatient  -Discussing with nephrology in regards to valeriano, continue for now for strict I/O  -c/w Retacrit    Migraine  Started on Fioricet as needed. Hypertension  Diabetes type 2  Hyperlipidemia  Polysubstance abuse  Sliding scale insulin along with Lantus  -c/w amlodipine      Body mass index is 20.58 kg/m². Estimated discharge date:   Barriers: Disposition    Code status: Full  Prophylaxis: Lovenox  Recommended Disposition: SNF     Subjective:     Chief Complaint / Reason for Physician Visit  Patient seen and examined today, eating lunch.   He currently has aches and pains in his back, but otherwise no additional complaints. Objective:     VITALS:   Last 24hrs VS reviewed since prior progress note. Most recent are:  Patient Vitals for the past 24 hrs:   Temp Pulse Resp BP SpO2   01/05/22 1134 98.1 °F (36.7 °C) (!) 104 16 129/86 98 %   01/05/22 0852 98.5 °F (36.9 °C) 87 16 118/77 100 %   01/05/22 0430 97.8 °F (36.6 °C) 74 18 (!) 145/91 98 %   01/04/22 2053 98.5 °F (36.9 °C) 84 17 122/70    01/04/22 1735 97.5 °F (36.4 °C) 78 16 127/87    01/04/22 1730  80 16 (!) 158/84    01/04/22 1715  83 16 130/85    01/04/22 1700  85 16 130/88    01/04/22 1645  83 16 126/86    01/04/22 1630  82 16 129/85    01/04/22 1615  82 16 128/83    01/04/22 1600  86 16 125/88    01/04/22 1545  84 16 128/89    01/04/22 1530  87 16 131/89    01/04/22 1515  89 16 (!) 127/91    01/04/22 1500  85 16 139/88    01/04/22 1445  83 16 123/81    01/04/22 1430  79 16 (!) 144/84    01/04/22 1415  79 16 121/82    01/04/22 1405 97.7 °F (36.5 °C) 80 16 125/80    01/04/22 1219  97  139/88        Intake/Output Summary (Last 24 hours) at 1/5/2022 1218  Last data filed at 1/5/2022 9537  Gross per 24 hour   Intake    Output 4000 ml   Net -4000 ml        I had a face to face encounter and independently examined this patient on 1/5/2022, as outlined below:  PHYSICAL EXAM:  General: WD, WN. Awake, not alert, cooperative, no acute distress    EENT:  EOMI. Anicteric sclerae. MMM  Resp:  CTA bilaterally, no wheezing or rales. No accessory muscle use  CV:  Regular  rhythm,  No edema, permcath left chest wall  GI:  Soft, Non distended, Non tender. +Bowel sounds  Neurologic:  Awake and alert, does not appear confused, normal speech,   Psych:   Good insight. Not anxious nor agitated  Skin:  No rashes.   No jaundice, multiple tatoos    Reviewed most current lab test results and cultures  YES  Reviewed most current radiology test results   YES  Review and summation of old records today    NO  Reviewed patient's current orders and MAR    YES  PMH/SH reviewed - no change compared to H&P  ________________________________________________________________________  Care Plan discussed with:    Comments   Patient x    Family      RN x    Care Manager     Consultant                       x Multidiciplinary team rounds were held today with , nursing, pharmacist and clinical coordinator. Patient's plan of care was discussed; medications were reviewed and discharge planning was addressed. ________________________________________________________________________  Total NON critical care TIME:  32  Minutes    Total CRITICAL CARE TIME Spent:   Minutes non procedure based      Comments   >50% of visit spent in counseling and coordination of care     ________________________________________________________________________  Mau Joshi MD     Procedures: see electronic medical records for all procedures/Xrays and details which were not copied into this note but were reviewed prior to creation of Plan. LABS:  I reviewed today's most current labs and imaging studies. Pertinent labs include:  No results for input(s): WBC, HGB, HCT, PLT, HGBEXT, HCTEXT, PLTEXT, HGBEXT, HCTEXT, PLTEXT in the last 72 hours.   Recent Labs     01/05/22  0543 01/04/22  0141   * 132*   K 3.6 3.7   CL 96* 91*   CO2 29 32   * 95   BUN 32* 42*   CREA 3.96* 5.42*   CA 8.8 8.7   PHOS 4.0 4.6   ALB 2.7* 2.8*       Signed: Mau Joshi MD

## 2022-01-05 NOTE — PROGRESS NOTES
Transition of Care Plan:    RUR:  16%   Moderate Risk for Readmission  LOS:  8 Days  Disposition:  Acute Rehab - Home with family  NEW ESRD:  Requires Dialysis 3 x week at OP facility. Follow up appointments:  As Needed  DME needed:  Acute Rehab to provide  Transportation at Discharge:  Medicaid Transport  [place] or means to access home:   Sister SEBASTIAN Medicare Letter:   N/A  Is patient a BCPI-A Bundle: If yes, was Bundle Letter given?:    Is patient a  and connected with the [facility]? N/A  If yes, was [place] transfer form completed and [facility] notified? Caregiver Contact:  Vy Casey  [phone]  Discharge Caregiver contacted prior to discharge? Met with patient this AM to review his disposition plan. He has been informed that he has been released from residential at this time due to multiple medical reasons. Patient was scheduled for release in May 2022 for DUI. Patient is very willing to participate in Acute Rehab services with plan to discharge home with sister. Preference would be to return to the [facility] area if possible which is closer to family. Patient tearful about life events recently and states he wants to remain ETOH free. Patient was provided with the opportunity to speak with his sister, Ms. Tee Schaefer by phone and assisted patient in making the phone call. Referrals for Acute Rehab initiated to [facility]. Spoke with Karine (Admissions) at [address] [place] [address] who will review for possible Admission. Care Management Interventions  Mode of Transport at Discharge:  Other (see comment) (Medicaid Transport)  Transition of Care Consult (CM Consult): Discharge Planning (Acute Rehab is first option;)  Physical Therapy Consult: Yes  Occupational Therapy Consult: Yes  Support Systems: Child(chace)  Confirm Follow Up Transport: Other (see comment) (Family vs. Medicaid transport)  The Plan for Transition of Care is Related to the Following Treatment Goals : Acute Rehab with return to family's home.   Discharge Location  Discharge Placement: Rehab hospital/unit acute     [facility], [address] [facility], [facility], [facility]  Complex CM/  [phone]

## 2022-01-05 NOTE — PROGRESS NOTES
Problem: Self Care Deficits Care Plan (Adult)  Goal: *Acute Goals and Plan of Care (Insert Text)  Description: FUNCTIONAL STATUS PRIOR TO ADMISSION: Patient was independent and active without use of DME. Was incarcerated just prior to admit. Works as a . HOME SUPPORT PRIOR TO ADMISSION: The patient lived with roommates but did not require assist.    Occupational Therapy Goals:  Initiated 1/4/2022  1. Patient will perform grooming with modified independence within 7 days. 2. Patient will perform upper body dressing and lower body dressing with modified independence within 7 days. 3. Patient will perform toileting with modified independence within 7 days. 4. Patient will transfer from toilet with modified independence using the least restrictive device and appropriate durable medical equipment within 7 days. Outcome: Progressing Towards Goal      OCCUPATIONAL THERAPY TREATMENT  Patient: Linda Villeda (89 y.o. male)  Date: 1/5/2022  Diagnosis: AMS (altered mental status) [R41.82]  Status epilepticus ([facility] [G40.901]  Hypoglycemia [E16.2]  ESRD needing dialysis ([facility] [N18.6, Z99.2]  [facility] [J69.0] <principal problem not specified>       Precautions: Fall  Chart, occupational therapy assessment, plan of care, and goals were reviewed. ASSESSMENT  Patient is making excellent functional progress in response to OT intervention, demonstrating significantly improving cognition, strength, balance and activity tolerance. Overall he was supervision to mod I for bed mobility, he was SBA to CGA for transfers and ambulation, and he was supervision/setup for all ADLs performed. Patient with c/o chronic RLE pain this session, which mainly impacts his gait. He is generally demonstrating good safety awareness, and is receptive to any additional safety cueing required during functional mobility.  Patient without any significant LOB during standing ADLs or ambulation during this session. At this time he continues to benefit from acute OT and he will need inpatient rehab after discharge. PLAN :  Patient continues to benefit from skilled intervention to address the above impairments. Continue treatment per established plan of care. to address goals. Recommendation for discharge: (in order for the patient to meet his/her long term goals)  Therapy 3 hours per day 5-7 days per week      Equipment recommendations for successful discharge (if) home:Cane         OBJECTIVE DATA SUMMARY:   Cognitive/Behavioral Status:  Neurologic State: Alert;Confused  Orientation Level: Oriented to person;Oriented to place  Cognition: Decreased attention/concentration; Follows commands      Functional Mobility and Transfers for ADLs:  Bed Mobility:  Rolling: Modified independent  Supine to Sit: Modified independent  Scooting: Supervision    Transfers:  Sit to Stand: Stand-by assistance  Functional Transfers  Bathroom Mobility: Contact guard assistance (ambulating with HHA)  Toilet Transfer : Stand-by assistance  Cues: Verbal cues provided  Adaptive Equipment: Grab bars  Bed to Chair: Contact guard assistance (ambulating HHA)    Balance:  Sitting: Intact  Standing: Impaired  Standing - Static: Good  Standing - Dynamic : Fair    ADL Intervention:    Grooming  Position Performed: Standing  Washing Face: Supervision;Set-up  Washing Hands: Supervision;Set-up  Brushing/Combing Hair: Supervision;Set-up  Cues: Verbal cues provided; Tactile cues provided;Visual cues provided     Upper Body Dressing Assistance  Shirt simulation with hospital gown: Supervision;Set-up    Lower Body Dressing Assistance  Socks: Supervision;Set-up  Leg Crossed Method Used: Yes  Position Performed: Seated edge of bed    Toileting  Toileting Assistance: Supervision  Clothing Management: Supervision      Pain:  Chronic RLE pain    Activity Tolerance:   Good  Please refer to the flowsheet for vital signs taken during this treatment.     After treatment patient left in no apparent distress:   Sitting in chair, Call bell within reach, and Bed / chair alarm activated    COMMUNICATION/COLLABORATION:   The patients plan of care was discussed with: Physical Therapist and Registered Nurse    Amos Garibay OTR/L  Time Calculation: 32 mins

## 2022-01-05 NOTE — PROGRESS NOTES
End of Shift Note    Bedside shift change report given to Helio Bowling (oncoming nurse) by Nile Horne RN (offgoing nurse). Report included the following information SBAR    Shift worked:  days   Shift summary and any significant changes: Garcia removed, voiding trial began       Concerns for physician to address:  none   Zone phone for oncoming shift:   [phone]     Patient Information  Adan Pierre [address]  49 y.o.  12/28/2021 11:05 PM by Nathalie Holguin MD. Navdeep Castaneda was admitted from Home    Problem List  Patient Active Problem List    Diagnosis Date Noted    Status epilepticus ([address] 12/28/2021    Aspiration pneumonia ([address] 12/28/2021    ESRD needing dialysis ([address] 12/28/2021    AMS (altered mental status) 12/28/2021    ATN (acute tubular necrosis) ([address] 12/25/2021    Interstitial nephritis determined by biopsy 12/25/2021    Thrombocytopenia ([address] 12/25/2021    Hypertension 12/25/2021    Sepsis ([address] 12/25/2021    Acute metabolic encephalopathy 35/60/1460    Hypoglycemia 11/25/2021    EUSEBIA (acute kidney injury) ([address] 35/64/7037    Metabolic acidemia 59/87/7142    Intractable nausea and vomiting 11/25/2021    Hyperglycemia due to type 2 diabetes mellitus ([address] 05/05/2019    Hyperosmolar non-ketotic state in patient with type 2 diabetes mellitus ([address] 12/16/2018     Past Medical History:   Diagnosis Date    Asthma     Diabetes ([address]     High cholesterol     Seizure ([address]        Core Measures:  CVA: No No  CHF:No No  PNA:No No    Activity:  Activity Level: Up with Assistance  Number times ambulated in hallways past shift: 0  Number of times OOB to chair past shift: 1    Cardiac:   Cardiac Monitoring: No      Cardiac Rhythm: Sinus Rhythm    Access:   Current line(s): PIV and HD access   Central Line? Genitourinary:   Urinary status: due to void  Urinary Catheter?  No    Respiratory:   O2 Device: None (Room air)  Chronic home O2 use?: NO  Incentive spirometer at bedside: NO       GI:  Last Bowel Movement Date: 01/01/22  Current diet:  ADULT DIET Easy to Chew  Passing flatus: YES  Tolerating current diet: YES       Pain Management:   Patient states pain is manageable on current regimen: YES    Skin:  Loco Score: 17  Interventions: increase time out of bed    Patient Safety:  Fall Score:  Total Score: 3  Interventions: bed/chair alarm, gripper socks and pt to call before getting OOB  High Fall Risk: Yes  @Rollbelt  @dexterity to release roll belt  Yes/No ( must document dexterity  here by stating Yes or No here, otherwise this is a restraint and must follow restraint documentation policy.)    DVT prophylaxis:  DVT prophylaxis Med- Yes  DVT prophylaxis SCD or ESVIN- No     Wounds: (If Applicable)  Wounds- No  Location     Active Consults:  IP CONSULT TO NEUROLOGY  IP CONSULT TO NEPHROLOGY  IP CONSULT TO PSYCHIATRY    Length of Stay:  Expected LOS: 4d 4h  Actual LOS: 8  Discharge Plan: IPR      Brandee Crandall RN

## 2022-01-05 NOTE — PROGRESS NOTES
Problem: Dysphagia (Adult)  Goal: *Acute Goals and Plan of Care (Insert Text)  Description: Speech Pathology Goals  Initiated 12/30/2021    1. Patient will tolerate Soft&Bite-Sized/Thin Liquids without overt difficulty within 7 days. MET 1/3/2022  Added 1/3/2022: 2. Patient will tolerate Easy to Chew/Thin Liquids without signs of aspiration within 7 days. MET  Outcome: Resolved/Met     SPEECH LANGUAGE PATHOLOGY DYSPHAGIA TREATMENT/DISCHARGE  Patient: Felicitas George (15 y.o. male)  Date: 1/5/2022  Diagnosis: AMS (altered mental status) [R41.82]  Status epilepticus ([facility] [G40.901]  Hypoglycemia [E16.2]  ESRD needing dialysis ([facility] [N18.6, Z99.2]  Aspiration pneumonia ([facility] [J69.0] <principal problem not specified>       Precautions:  Fall    ASSESSMENT:  Patient with continued prolonged mastication likely related to edentulous status. No overt s/s aspiration observed. PLAN:  -Continue easy to chew/thin liquid diet  Patient will be discharged from acute skilled speech therapy at this time. Rationale for discharge:  Goals achieved    Discharge Recommendations:  None     SUBJECTIVE:   Patient stated I forgot what I was going to say.     OBJECTIVE:   Cognitive and Communication Status:  Neurologic State: Alert,Confused  Orientation Level: Oriented to person,Other (Comment) (oriented to month, not date)  Cognition: Decreased attention/concentration,Memory loss    Perception: Appears intact    Perseveration: No perseveration noted    Safety/Judgement: Fall prevention  Dysphagia Treatment:     P.O. Trials:  Patient Position: up in chair  Vocal quality prior to P.O.: No impairment  Consistency Presented: Thin liquid; Solid  How Presented: Self-fed/presented;Straw;Successive swallows     Bolus Acceptance: No impairment  Bolus Formation/Control: Impaired  Type of Impairment: Delayed;Mastication  Propulsion: No impairment  Oral Residue: None  Initiation of Swallow: No impairment  Laryngeal Elevation: Functional  Aspiration Signs/Symptoms: None  Pharyngeal Phase Characteristics: No impairment, issues, or problems                 NOMS:   The NOMS functional outcome measure was used to quantify this patient's level of swallowing impairment. Based on the NOMS, the patient was determined to be at level 6 for swallow function     NOMS Swallowing Levels:  Level 1 (CN): NPO  Level 2 (CM): NPO but takes consistency in therapy  Level 3 (CL): Takes less than 50% of nutrition p.o. and continues with nonoral feedings; and/or safe with mod cues; and/or max diet restriction  Level 4 (CK): Safe swallow but needs mod cues; and/or mod diet restriction; and/or still requires some nonoral feeding/supplements  Level 5 (CJ): Safe swallow with min diet restriction; and/or needs min cues  Level 6 (CI): Independent with p.o.; rare cues; usually self cues; may need to avoid some foods or needs extra time  Level 7 ([facility]): Independent for all p.o.  ENRIQUE. (2003). National Outcomes Measurement System (NOMS): Adult Speech-Language Pathology User's Guide. Pain:  Pain Scale 1: Numeric (0 - 10)  Pain Intensity 1: 0       After treatment:   Patient left in no apparent distress sitting up in chair, Call bell within reach and Nursing notified    COMMUNICATION/EDUCATION:   Patient was educated regarding his deficit(s) of WFL swallow as this relates to his diagnosis. He demonstrated Good understanding as evidenced by verbalizing understanding. The patient's plan of care including recommendations, planned interventions, and recommended diet changes were discussed with: Registered nurse.      FAITH Joyce  Time Calculation: 10 mins

## 2022-01-06 LAB
ANION GAP SERPL CALC-SCNC: 8 MMOL/L (ref 5–15)
BASOPHILS # BLD: 0 K/UL (ref 0–0.1)
BASOPHILS NFR BLD: 1 % (ref 0–1)
BUN SERPL-MCNC: 43 MG/DL (ref 6–20)
BUN/CREAT SERPL: 9 (ref 12–20)
CALCIUM SERPL-MCNC: 8.5 MG/DL (ref 8.5–10.1)
CHLORIDE SERPL-SCNC: 98 MMOL/L (ref 97–108)
CO2 SERPL-SCNC: 27 MMOL/L (ref 21–32)
CREAT SERPL-MCNC: 4.71 MG/DL (ref 0.7–1.3)
DIFFERENTIAL METHOD BLD: ABNORMAL
EOSINOPHIL # BLD: 0.1 K/UL (ref 0–0.4)
EOSINOPHIL NFR BLD: 2 % (ref 0–7)
ERYTHROCYTE [DISTWIDTH] IN BLOOD BY AUTOMATED COUNT: 14.5 % (ref 11.5–14.5)
GLUCOSE BLD STRIP.AUTO-MCNC: 118 MG/DL (ref 65–117)
GLUCOSE BLD STRIP.AUTO-MCNC: 158 MG/DL (ref 65–117)
GLUCOSE BLD STRIP.AUTO-MCNC: 279 MG/DL (ref 65–117)
GLUCOSE BLD STRIP.AUTO-MCNC: 367 MG/DL (ref 65–117)
GLUCOSE SERPL-MCNC: 96 MG/DL (ref 65–100)
HCT VFR BLD AUTO: 29.8 % (ref 36.6–50.3)
HGB BLD-MCNC: 9.8 G/DL (ref 12.1–17)
IMM GRANULOCYTES # BLD AUTO: 0.1 K/UL (ref 0–0.04)
IMM GRANULOCYTES NFR BLD AUTO: 1 % (ref 0–0.5)
LYMPHOCYTES # BLD: 1.4 K/UL (ref 0.8–3.5)
LYMPHOCYTES NFR BLD: 22 % (ref 12–49)
MAGNESIUM SERPL-MCNC: 1.7 MG/DL (ref 1.6–2.4)
MCH RBC QN AUTO: 30.6 PG (ref 26–34)
MCHC RBC AUTO-ENTMCNC: 32.9 G/DL (ref 30–36.5)
MCV RBC AUTO: 93.1 FL (ref 80–99)
MONOCYTES # BLD: 0.9 K/UL (ref 0–1)
MONOCYTES NFR BLD: 14 % (ref 5–13)
NEUTS SEG # BLD: 3.7 K/UL (ref 1.8–8)
NEUTS SEG NFR BLD: 60 % (ref 32–75)
NRBC # BLD: 0 K/UL (ref 0–0.01)
NRBC BLD-RTO: 0 PER 100 WBC
PHOSPHATE SERPL-MCNC: 4.2 MG/DL (ref 2.6–4.7)
PLATELET # BLD AUTO: 327 K/UL (ref 150–400)
PMV BLD AUTO: 9.9 FL (ref 8.9–12.9)
POTASSIUM SERPL-SCNC: 4 MMOL/L (ref 3.5–5.1)
RBC # BLD AUTO: 3.2 M/UL (ref 4.1–5.7)
SERVICE CMNT-IMP: ABNORMAL
SODIUM SERPL-SCNC: 133 MMOL/L (ref 136–145)
WBC # BLD AUTO: 6.2 K/UL (ref 4.1–11.1)

## 2022-01-06 PROCEDURE — 83735 ASSAY OF MAGNESIUM: CPT

## 2022-01-06 PROCEDURE — 74011250637 HC RX REV CODE- 250/637: Performed by: STUDENT IN AN ORGANIZED HEALTH CARE EDUCATION/TRAINING PROGRAM

## 2022-01-06 PROCEDURE — 65660000000 HC RM CCU STEPDOWN

## 2022-01-06 PROCEDURE — 74011250637 HC RX REV CODE- 250/637: Performed by: PSYCHIATRY & NEUROLOGY

## 2022-01-06 PROCEDURE — 97535 SELF CARE MNGMENT TRAINING: CPT

## 2022-01-06 PROCEDURE — 36415 COLL VENOUS BLD VENIPUNCTURE: CPT

## 2022-01-06 PROCEDURE — 84100 ASSAY OF PHOSPHORUS: CPT

## 2022-01-06 PROCEDURE — 74011250636 HC RX REV CODE- 250/636: Performed by: INTERNAL MEDICINE

## 2022-01-06 PROCEDURE — 97116 GAIT TRAINING THERAPY: CPT

## 2022-01-06 PROCEDURE — 74011636637 HC RX REV CODE- 636/637: Performed by: STUDENT IN AN ORGANIZED HEALTH CARE EDUCATION/TRAINING PROGRAM

## 2022-01-06 PROCEDURE — 80048 BASIC METABOLIC PNL TOTAL CA: CPT

## 2022-01-06 PROCEDURE — 85025 COMPLETE CBC W/AUTO DIFF WBC: CPT

## 2022-01-06 PROCEDURE — 74011636637 HC RX REV CODE- 636/637: Performed by: HOSPITALIST

## 2022-01-06 PROCEDURE — 82962 GLUCOSE BLOOD TEST: CPT

## 2022-01-06 PROCEDURE — 97530 THERAPEUTIC ACTIVITIES: CPT

## 2022-01-06 RX ADMIN — Medication 15 UNITS: at 10:04

## 2022-01-06 RX ADMIN — LEVETIRACETAM 500 MG: 500 TABLET, FILM COATED ORAL at 01:16

## 2022-01-06 RX ADMIN — Medication 4 UNITS: at 11:44

## 2022-01-06 RX ADMIN — Medication 100 MG: at 10:04

## 2022-01-06 RX ADMIN — LACOSAMIDE 100 MG: 50 TABLET, FILM COATED ORAL at 01:16

## 2022-01-06 RX ADMIN — EPOETIN ALFA-EPBX 4000 UNITS: 4000 INJECTION, SOLUTION INTRAVENOUS; SUBCUTANEOUS at 21:05

## 2022-01-06 RX ADMIN — MELATONIN 3 MG: at 21:05

## 2022-01-06 RX ADMIN — AMLODIPINE BESYLATE 5 MG: 5 TABLET ORAL at 10:04

## 2022-01-06 RX ADMIN — LEVETIRACETAM 500 MG: 500 TABLET, FILM COATED ORAL at 14:24

## 2022-01-06 RX ADMIN — Medication 3 UNITS: at 17:43

## 2022-01-06 RX ADMIN — BUTALBITAL, ACETAMINOPHEN, AND CAFFEINE 1 TABLET: 50; 325; 40 TABLET ORAL at 22:55

## 2022-01-06 RX ADMIN — LACOSAMIDE 100 MG: 50 TABLET, FILM COATED ORAL at 14:24

## 2022-01-06 NOTE — PROGRESS NOTES
Nephrology Progress Note  Dorian Antonio     www. [email or web address]  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   Hypertension   Sepsis   Anemia    PLAN-   Ongoing Renal recovery, 4.0 L UOP   Hold HD today   Last HD was on 1/4/22   Continue norvasc   Daily labs         Subjective: Interval History:   bp stable  S/p hd 1/4      Objective:   Vitals:    01/05/22 1654 01/05/22 2010 01/06/22 0316 01/06/22 0855   BP: (!) 140/91 128/80 128/73 133/84   Pulse: 96 61 89 85   Resp: 16 17 17 16   Temp: 98.1 °F (36.7 °C) 98.1 °F (36.7 °C) 98 °F (36.7 °C) 98.2 °F (36.8 °C)   TempSrc:       SpO2: 97% 96% 98% 98%   Weight:       Height:          01/05 0701 - 01/06 0700  In: 720 [P.O.:720]  Out: 4000 [Urine:4000]  Last 3 Recorded Weights in this Encounter    12/31/21 0634   Weight: 51 kg (112 lb 8 oz)      Physical exam:   GEN:  NAD  NECK:  Supple, no thyromegaly  RESP: Clear  b/l, no  wheezing,   CVS: RRR,S1,S2   NEURO: non focal, normal speech  EXT:NO  Edema +nt     Right permcath      Chart reviewed. Pertinent Notes reviewed. Data Review :  Recent Labs     01/06/22 0214 01/05/22  0543 01/04/22  0141   * 132* 132*   K 4.0 3.6 3.7   CL 98 96* 91*   CO2 27 29 32   BUN 43* 32* 42*   CREA 4.71* 3.96* 5.42*   GLU 96 149* 95   CA 8.5 8.8 8.7   MG 1.7  --   --    PHOS 4.2 4.0 4.6     Recent Labs     01/06/22 0214   WBC 6.2   HGB 9.8*   HCT 29.8*        No results for input(s): FE, TIBC, PSAT, FERR in the last 72 hours.    Medication list  reviewed  Current Facility-Administered Medications   Medication Dose Route Frequency    epoetin jeff-epbx (RETACRIT) injection 4,000 Units 4,000 Units SubCUTAneous Q TUE, THU & SAT    melatonin tablet 3 mg  3 mg Oral QHS    thiamine mononitrate (B-1) tablet 100 mg  100 mg Oral DAILY    butalbital-acetaminophen-caffeine (FIORICET, ESGIC) -40 mg per tablet 1 Tablet  1 Tablet Oral Q6H PRN    amLODIPine (NORVASC) tablet 5 mg  5 mg Oral DAILY    heparin (porcine) 1,000 unit/mL injection 1,700 Units  1,700 Units InterCATHeter DIALYSIS PRN    And    heparin (porcine) 1,000 unit/mL injection 1,600 Units  1,600 Units InterCATHeter DIALYSIS PRN    levETIRAcetam (KEPPRA) tablet 500 mg  500 mg Oral BID    lacosamide (VIMPAT) tablet 100 mg  100 mg Oral Q12H    insulin glargine (LANTUS) injection 15 Units  15 Units SubCUTAneous DAILY    hydrALAZINE (APRESOLINE) 20 mg/mL injection 10 mg  10 mg IntraVENous Q6H PRN    ondansetron (ZOFRAN ODT) tablet 4 mg  4 mg Oral Q8H PRN    Or    ondansetron (ZOFRAN) injection 4 mg  4 mg IntraVENous Q6H PRN    acetaminophen (TYLENOL) tablet 650 mg  650 mg Oral Q4H PRN    Or    acetaminophen (TYLENOL) solution 650 mg  650 mg Per NG tube Q4H PRN    Or    acetaminophen (TYLENOL) suppository 650 mg  650 mg Rectal Q4H PRN    insulin lispro (HUMALOG) injection   SubCUTAneous AC&HS    glucose chewable tablet 16 g  4 Tablet Oral PRN    dextrose (D50W) injection syrg 12.5-25 g  12.5-25 g IntraVENous PRN    glucagon (GLUCAGEN) injection 1 mg  1 mg IntraMUSCular PRN          Gregory Hewitt MD              [facility]  [facility] / [facility]  [address] [place], [address]  Phone - [phone]               Fax - [phone]

## 2022-01-06 NOTE — PROGRESS NOTES
Hospitalist Progress Note    NAME: Man Josue   :  1965   MRN:  [id number]       Assessment / Plan:  Acute metabolic encephalopathy  Status epilepticus  History of seizure disorder  Aspiration pneumonia  back to baseline  MRI brainno acute abnormality. CT headno acute abnormality. Lumbar puncture doneCSF reveals no infectious etiology so far. CSF culture negative. CSF HSV negative so we will discontinue the acyclovir. CSF meningitis panel negative. Out side hospital EEG indication triphasic slowing and focal 3hz right fronto temproal field siezure activety. EEG done here shows no seizure. On continuous EEGno more seizure reported on the continuous EEG. Neurology on boardappreciate input. Continue Keppra along with Vimpat. Seizure precaution. Chest x-rayno acute pathology.  -Treated the course of Levaquin   WBC stable  -c/w thiamine  Repeat blood culture negative. Consulted psychiatryrecommend Haldol as needed. Mental status appears to be at baseline  -MALIA was able to get a hold of sister    End-stage renal disease on dialysis  Anemia  Nephrology was consultedhemodialysis per nephrology. Dialysis days changed today to Tuesday, Thursday and Saturday  Creatinine elevated, dialysis on hold as per nephro, patient making adeuqate urine  -Hopefully Cr will plateau, increased to 4.7 today  Hold HD and monitor urine output over the weekend  -Garcia removed  -c/w Retacrit  -Monitor Cr, avoid nephrotoxins    Migraine  Started on Fioricet as needed. Hypertension  Diabetes type 2  Hyperlipidemia  Polysubstance abuse  Sliding scale insulin along with Lantus  -c/w amlodipine      Body mass index is 20.58 kg/m².     Estimated discharge date: January 10  Barriers: Renal function stabilize    Code status: Full  Prophylaxis: Lovenox  Recommended Disposition: SNF     Subjective:     Chief Complaint / Reason for Physician Visit  Patient seen and examined today, still with back pain and knee pain that is chronic. Relieved his bal is out. Also happy that he may not need HD long term. No additional complaints. Objective:     VITALS:   Last 24hrs VS reviewed since prior progress note. Most recent are:  Patient Vitals for the past 24 hrs:   Temp Pulse Resp BP SpO2   01/06/22 1131 98.6 °F (37 °C) 67 18 122/84 97 %   01/06/22 0855 98.2 °F (36.8 °C) 85 16 133/84 98 %   01/06/22 0316 98 °F (36.7 °C) 89 17 128/73 98 %   01/05/22 2010 98.1 °F (36.7 °C) 61 17 128/80 96 %   01/05/22 1654 98.1 °F (36.7 °C) 96 16 (!) 140/91 97 %       Intake/Output Summary (Last 24 hours) at 1/6/2022 1447  Last data filed at 1/6/2022 1131  Gross per 24 hour   Intake 240 ml   Output 3300 ml   Net -3060 ml        I had a face to face encounter and independently examined this patient on 1/6/2022, as outlined below:  PHYSICAL EXAM:  General: WD, WN. Awake, not alert, cooperative, no acute distress    EENT:  EOMI. Anicteric sclerae. MMM  Resp:  CTA bilaterally, no wheezing or rales. No accessory muscle use  CV:  Regular  rhythm,  No edema, permcath left chest wall  GI:  Soft, Non distended, Non tender. +Bowel sounds  Neurologic:  Awake and alert, does not appear confused, normal speech,   Psych:   Good insight. Not anxious nor agitated  Skin:  No rashes. No jaundice, multiple tatoos    Reviewed most current lab test results and cultures  YES  Reviewed most current radiology test results   YES  Review and summation of old records today    NO  Reviewed patient's current orders and MAR    YES  PMH/SH reviewed - no change compared to H&P  ________________________________________________________________________  Care Plan discussed with:    Comments   Patient x    Family      RN x    Care Manager     Consultant                       x Multidiciplinary team rounds were held today with , nursing, pharmacist and clinical coordinator.   Patient's plan of care was discussed; medications were reviewed and discharge planning was addressed. ________________________________________________________________________  Total NON critical care TIME:  32  Minutes    Total CRITICAL CARE TIME Spent:   Minutes non procedure based      Comments   >50% of visit spent in counseling and coordination of care     ________________________________________________________________________  Dennis Noel MD     Procedures: see electronic medical records for all procedures/Xrays and details which were not copied into this note but were reviewed prior to creation of Plan. LABS:  I reviewed today's most current labs and imaging studies.   Pertinent labs include:  Recent Labs     01/06/22 0214   WBC 6.2   HGB 9.8*   HCT 29.8*        Recent Labs     01/06/22  0214 01/05/22  0543 01/04/22  0141   * 132* 132*   K 4.0 3.6 3.7   CL 98 96* 91*   CO2 27 29 32   GLU 96 149* 95   BUN 43* 32* 42*   CREA 4.71* 3.96* 5.42*   CA 8.5 8.8 8.7   MG 1.7  --   --    PHOS 4.2 4.0 4.6   ALB  --  2.7* 2.8*       Signed: Dennis Noel MD

## 2022-01-06 NOTE — PROGRESS NOTES
End of Shift Note     Bedside shift change report given to Charlean Olszewski, RN (oncoming nurse) by Johnson Boles RN (offgoing nurse). Report included the following information SBAR     Shift worked:  days   Shift summary and any significant changes:     Patient voiding well without bal. BG was 369 at lunch time today, gave 4 units insulin per Dr Jonell Duverney.         Concerns for physician to address:  none   [facility] phone for oncoming shift:   [phone]      Patient Information  Garrett Mcardle [address]  49 y.o.  12/28/2021 11:05 PM by Naveed Macedo MD. Arely Quintero was admitted from Home     Problem List       Patient Active Problem List     Diagnosis Date Noted    Status epilepticus ([address] 12/28/2021    Aspiration pneumonia ([address] 12/28/2021    ESRD needing dialysis ([address] 12/28/2021    AMS (altered mental status) 12/28/2021    ATN (acute tubular necrosis) ([address] 12/25/2021    Interstitial nephritis determined by biopsy 12/25/2021    Thrombocytopenia ([address] 12/25/2021    Hypertension 12/25/2021    Sepsis ([address] 12/25/2021    Acute metabolic encephalopathy 19/59/9296    Hypoglycemia 11/25/2021    EUSEBIA (acute kidney injury) ([address] 59/50/4303    Metabolic acidemia 40/88/6034    Intractable nausea and vomiting 11/25/2021    Hyperglycemia due to type 2 diabetes mellitus ([address] 05/05/2019    Hyperosmolar non-ketotic state in patient with type 2 diabetes mellitus ([address] 12/16/2018           Past Medical History:   Diagnosis Date    Asthma      Diabetes ([address]      High cholesterol      Seizure ([address]           Core Measures:  CVA: No No  CHF:No No  PNA:No No     Activity:  Activity Level: Up with Assistance  Number times ambulated in hallways past shift: 0  Number of times OOB to chair past shift: 0     Cardiac:   Cardiac Monitoring: No      Cardiac Rhythm: Sinus Rhythm     Access:   Current line(s): PIV and HD access   Central Line?      Genitourinary:   Urinary status: due to void  Urinary Catheter?  No     Respiratory:   O2 Device: None (Room air)  Chronic home O2 use?: NO  Incentive spirometer at bedside: NO     GI:  Last Bowel Movement Date: 01/01/22  Current diet:  ADULT DIET Easy to Chew  Passing flatus: YES  Tolerating current diet: YES     Pain Management:   Patient states pain is manageable on current regimen: YES     Skin:  Loco Score: 19  Interventions: increase time out of bed    Patient Safety:  Fall Score:  Total Score: 3  Interventions: bed/chair alarm, gripper socks and pt to call before getting OOB  High Fall Risk: Yes  @Rollbelt  @dexterity to release roll belt  Yes/No ( must document dexterity  here by stating Yes or No here, otherwise this is a restraint and must follow restraint documentation policy.)     DVT prophylaxis:  DVT prophylaxis Med- Yes  DVT prophylaxis SCD or ESVIN- No      Wounds: (If Applicable)  Wounds- No  Location      Active Consults:  IP CONSULT TO NEUROLOGY  IP CONSULT TO NEPHROLOGY  IP CONSULT TO PSYCHIATRY     Length of Stay:  Expected LOS: 4d 4h  Actual LOS: 9  Discharge Plan: Yes, rehab pending placement       Grant Rogers RN

## 2022-01-06 NOTE — PROGRESS NOTES
Problem: Self Care Deficits Care Plan (Adult)  Goal: *Acute Goals and Plan of Care (Insert Text)  Description: FUNCTIONAL STATUS PRIOR TO ADMISSION: Patient was independent and active without use of DME. Was incarcerated just prior to admit. Works as a . HOME SUPPORT PRIOR TO ADMISSION: The patient lived with roommates but did not require assist.    Occupational Therapy Goals:  Initiated 1/4/2022  1. Patient will perform grooming with modified independence within 7 days. 2. Patient will perform upper body dressing and lower body dressing with modified independence within 7 days. 3. Patient will perform toileting with modified independence within 7 days. 4. Patient will transfer from toilet with modified independence using the least restrictive device and appropriate durable medical equipment within 7 days. Outcome: Progressing Towards Goal   OCCUPATIONAL THERAPY TREATMENT  Patient: Zofia Rojas (63 y.o. male)  Date: 1/6/2022  Diagnosis: AMS (altered mental status) [R41.82]  Status epilepticus ([facility] [G40.901]  Hypoglycemia [E16.2]  ESRD needing dialysis ([facility] [N18.6, Z99.2]  [facility] [J69.0] <principal problem not specified>       Precautions: Fall  Chart, occupational therapy assessment, plan of care, and goals were reviewed. ASSESSMENT  Patient continues with skilled OT services and is progressing towards goals. Patient continues to present with general weakness, impaired balance, RLE pain, intermittent confusion/memory loss, and decreased activity tolerance but continues to remain motivated for therapy. Patient received semisupine in bed and agreeable for therapy. Patient completed supine > sit with mod I and demonstrated intact sitting balance. Patient completed LB dressing with additional time and supervision this session secondary to RLE pain. Patient completed sit > stand with contact guard assist and ambulated into bathroom using SPC.  Patient completed toilet transfer/hygiene and grooming at sink with supervision. Patient declined additional mobility this session secondary to increased fatigue and pain. Patient agreed to end session sitting in chair and was left with all needs met. Patient continues to benefit from skilled OT services and demonstrated safety awareness throughout session. Recommend patient discharge to inpatient rehab. Current Level of Function Impacting Discharge (ADLs): supervision for self-care, mod I to contact guard assist for functional mobility using SPC    Other factors to consider for discharge: fall risk, R sided pain, memory loss         PLAN :  Patient continues to benefit from skilled intervention to address the above impairments. Continue treatment per established plan of care to address goals. Recommendation for discharge: (in order for the patient to meet his/her long term goals)  Therapy 3 hours per day 5-7 days per week    This discharge recommendation:  Has been made in collaboration with the attending provider and/or case management    IF patient discharges home will need the following DME: TBD in rehab       SUBJECTIVE:   Patient stated I feel like I keep having trouble with my memory.     OBJECTIVE DATA SUMMARY:   Cognitive/Behavioral Status:  Neurologic State: Alert  Orientation Level: Oriented to person;Oriented to place; Disoriented to situation;Disoriented to time  Cognition: Follows commands;Memory loss  Perception: Appears intact  Perseveration: No perseveration noted  Safety/Judgement: Decreased insight into deficits; Fall prevention    Functional Mobility and Transfers for ADLs:  Bed Mobility:  Rolling: Modified independent  Supine to Sit: Modified independent  Scooting: Supervision    Transfers:  Sit to Stand: Contact guard assistance  Stand to Sit: Stand-by assistance   Functional Transfers  Bathroom Mobility: Contact guard assistance  Toilet Transfer : Supervision  Cues: Tactile cues provided;Verbal cues provided  Adaptive Equipment: Grab bars; Cane (comment) [facility])  Bed to Chair: Contact guard assistance    Balance:  Sitting: Intact  Standing: Impaired; With support  Standing - Static: Good  Standing - Dynamic : Fair;Constant support    ADL Intervention:    Grooming  Position Performed: Standing (at sink)  Washing Hands: Supervision  Cues: Verbal cues provided    Lower Body Dressing Assistance  Socks: Supervision (additional time for RLE)  Leg Crossed Method Used: Yes  Position Performed: Seated edge of bed  Cues: Doff;Don;Verbal cues provided    Toileting  Bladder Hygiene: Supervision  Clothing Management: Supervision  Cues: Verbal cues provided    Cognitive Retraining  Safety/Judgement: Decreased insight into deficits; Fall prevention    Pain:  Patient c/o headache and RLE pain. RN aware and following. Activity Tolerance:   Fair and requires rest breaks    After treatment patient left in no apparent distress:   Sitting in chair, Call bell within reach, and Bed / chair alarm activated    COMMUNICATION/COLLABORATION:   The patients plan of care was discussed with: Physical therapist and Registered nurse.      AMANDA Tiwari/L  Time Calculation: 24 mins

## 2022-01-06 NOTE — PROGRESS NOTES
End of Shift Note    Bedside shift change report given to Becky RN (oncoming nurse) by Zoran Juarez RN (offgoing nurse). Report included the following information SBAR    Shift worked: night   Shift summary and any significant changes: Garcia removed yesterday, voided several times in the urinal        Concerns for physician to address:  none   [employer] phone for oncoming shift:   [phone]     Patient Information  Garrett Mcardle [address]  49 y.o.  12/28/2021 11:05 PM by Naveed Macedo MD. Arely Quintero was admitted from Home    Problem List  Patient Active Problem List    Diagnosis Date Noted    Status epilepticus ([address] 12/28/2021    Aspiration pneumonia ([address] 12/28/2021    ESRD needing dialysis ([address] 12/28/2021    AMS (altered mental status) 12/28/2021    ATN (acute tubular necrosis) ([address] 12/25/2021    Interstitial nephritis determined by biopsy 12/25/2021    Thrombocytopenia ([address] 12/25/2021    Hypertension 12/25/2021    Sepsis ([address] 12/25/2021    Acute metabolic encephalopathy 26/84/6999    Hypoglycemia 11/25/2021    EUSEBIA (acute kidney injury) ([address] 97/89/0718    Metabolic acidemia 12/41/0053    Intractable nausea and vomiting 11/25/2021    Hyperglycemia due to type 2 diabetes mellitus ([address] 05/05/2019    Hyperosmolar non-ketotic state in patient with type 2 diabetes mellitus ([address] 12/16/2018     Past Medical History:   Diagnosis Date    Asthma     Diabetes ([address]     High cholesterol     Seizure ([address]        Core Measures:  CVA: No No  CHF:No No  PNA:No No    Activity:  Activity Level: Up with Assistance  Number times ambulated in hallways past shift: 0  Number of times OOB to chair past shift: 1    Cardiac:   Cardiac Monitoring: No      Cardiac Rhythm: Sinus Rhythm    Access:   Current line(s): PIV and HD access   Central Line? Genitourinary:   Urinary status: due to void  Urinary Catheter?  No    Respiratory:   O2 Device: None (Room air)  Chronic home O2 use?: NO  Incentive spirometer at bedside: NO       GI:  Last Bowel Movement Date: 01/01/22  Current diet:  ADULT DIET Easy to Chew  Passing flatus: YES  Tolerating current diet: YES       Pain Management:   Patient states pain is manageable on current regimen: YES    Skin:  Loco Score: 17  Interventions: increase time out of bed    Patient Safety:  Fall Score:  Total Score: 3  Interventions: bed/chair alarm, gripper socks and pt to call before getting OOB  High Fall Risk: Yes  @Rollbelt  @dexterity to release roll belt  Yes/No ( must document dexterity  here by stating Yes or No here, otherwise this is a restraint and must follow restraint documentation policy.)    DVT prophylaxis:  DVT prophylaxis Med- Yes  DVT prophylaxis SCD or ESVIN- No     Wounds: (If Applicable)  Wounds- No  Location     Active Consults:  IP CONSULT TO NEUROLOGY  IP CONSULT TO NEPHROLOGY  IP CONSULT TO PSYCHIATRY    Length of Stay:  Expected LOS: 4d 4h  Actual LOS: 9  Discharge Plan: Melva Hudson RN

## 2022-01-06 NOTE — PROGRESS NOTES
Problem: Mobility Impaired (Adult and Pediatric)  Goal: *Acute Goals and Plan of Care (Insert Text)  Description: FUNCTIONAL STATUS PRIOR TO ADMISSION: Patient was independent and active without use of DME.    HOME SUPPORT PRIOR TO ADMISSION: Just prior to admission, [facility] was [facility] in [facility]. Prior to [facility], the patient lived with 3 roommates but did not require assist.    Physical Therapy Goals  Initiated 1/4/2022  1. Patient will move from supine to sit and sit to supine , scoot up and down, and roll side to side in bed with modified independence within 7 day(s). 2.  Patient will transfer from bed to chair and chair to bed with modified independence using the least restrictive device within 7 day(s). 3.  Patient will perform sit to stand with modified independence within 7 day(s). 4.  Patient will ambulate with modified independence for 300 feet with the least restrictive device within 7 day(s). 5.  Patient will ascend/descend 4 stairs with single handrail(s) with supervision/set-up within 7 day(s). Outcome: Progressing Towards Goal   PHYSICAL THERAPY TREATMENT  Patient: Viv Baltazar (56 y.o. male)  Date: 1/6/2022  Diagnosis: AMS (altered mental status) [R41.82]  Status epilepticus ([facility] [address] [G40.901]  Hypoglycemia [E16.2]  ESRD needing dialysis ([facility] [address] [N18.6, Z99.2]  Aspiration pneumonia ([facility] [address] [J69.0] <principal problem not specified>       Precautions: Fall  Chart, physical therapy assessment, plan of care and goals were reviewed. ASSESSMENT  Patient continues with skilled PT services and is progressing towards goals. Patient continues to present with general weakness, impaired balance, RLE pain, intermittent confusion/memory loss, and decreased activity tolerance but continues to remain motivated for therapy. Patient tolerated in room ambulation with support of cane but limited in further distance due to RLE pain and fatigue. He presents with impaired balance increasing fall risk. Will benefit from continued skilled therapy. Current Level of Function Impacting Discharge (mobility/balance): CGA for transfers and ambulation, distance of ambulation limited     Other factors to consider for discharge: fall risk, pain management         PLAN :  Patient continues to benefit from skilled intervention to address the above impairments. Continue treatment per established plan of care. to address goals. Recommendation for discharge: (in order for the patient to meet his/her long term goals)  Therapy 3 hours per day 5-7 days per week    This discharge recommendation:  Has been made in collaboration with the attending provider and/or case management    IF patient discharges home will need the following DME: to be determined (TBD)       SUBJECTIVE:   Patient stated I just can't remember things anymore.     OBJECTIVE DATA SUMMARY:   Critical Behavior:  Neurologic State: Alert  Orientation Level: Oriented to person,Oriented to place,Disoriented to situation,Disoriented to time  Cognition: Follows commands,Memory loss  Safety/Judgement: Decreased insight into deficits,Fall prevention  Functional Mobility Training:  Bed Mobility:  Rolling: Modified independent  Supine to Sit: Modified independent     Scooting: Supervision        Transfers:  Sit to Stand: Contact guard assistance  Stand to Sit: Stand-by assistance        Bed to Chair: Contact guard assistance      Use of SPC, increased time               Balance:  Sitting: Intact  Standing: Impaired; With support  Standing - Static: Good  Standing - Dynamic : Fair;Constant support  Ambulation/Gait Training:  Distance (ft): 20 Feet (ft) (x 2 reps; to/from bathroom)  Assistive Device: Cane, straight;Gait belt  Ambulation - Level of Assistance: Contact guard assistance; Additional time        Gait Abnormalities: Decreased step clearance; Antalgic;Trunk sway increased        Base of Support: Narrowed     Speed/Kristel: Pace decreased (<100 feet/min) Slow, increased RLE pain, fatigues quickly, unsteady   Stairs:   Unable to attempt due to LE pain     Pain Rating:  RLE pain with WB and transitional movements     Activity Tolerance:   Fair    After treatment patient left in no apparent distress:   Sitting in chair, Call bell within reach, and Bed / chair alarm activated    COMMUNICATION/COLLABORATION:   The patients plan of care was discussed with: Occupational therapist and Registered nurse.      Saranya Rubens, PT, DPT   Time Calculation: 20 mins

## 2022-01-07 LAB
ANION GAP SERPL CALC-SCNC: 6 MMOL/L (ref 5–15)
BASOPHILS # BLD: 0.1 K/UL (ref 0–0.1)
BASOPHILS NFR BLD: 1 % (ref 0–1)
BUN SERPL-MCNC: 52 MG/DL (ref 6–20)
BUN/CREAT SERPL: 10 (ref 12–20)
CALCIUM SERPL-MCNC: 8.9 MG/DL (ref 8.5–10.1)
CHLORIDE SERPL-SCNC: 96 MMOL/L (ref 97–108)
CO2 SERPL-SCNC: 28 MMOL/L (ref 21–32)
CREAT SERPL-MCNC: 5.31 MG/DL (ref 0.7–1.3)
DIFFERENTIAL METHOD BLD: ABNORMAL
EOSINOPHIL # BLD: 0.1 K/UL (ref 0–0.4)
EOSINOPHIL NFR BLD: 2 % (ref 0–7)
ERYTHROCYTE [DISTWIDTH] IN BLOOD BY AUTOMATED COUNT: 14.6 % (ref 11.5–14.5)
GLUCOSE BLD STRIP.AUTO-MCNC: 123 MG/DL (ref 65–117)
GLUCOSE BLD STRIP.AUTO-MCNC: 130 MG/DL (ref 65–117)
GLUCOSE BLD STRIP.AUTO-MCNC: 200 MG/DL (ref 65–117)
GLUCOSE BLD STRIP.AUTO-MCNC: 318 MG/DL (ref 65–117)
GLUCOSE BLD STRIP.AUTO-MCNC: 400 MG/DL (ref 65–117)
GLUCOSE BLD STRIP.AUTO-MCNC: 69 MG/DL (ref 65–117)
GLUCOSE SERPL-MCNC: 98 MG/DL (ref 65–100)
HCT VFR BLD AUTO: 31.2 % (ref 36.6–50.3)
HGB BLD-MCNC: 10.4 G/DL (ref 12.1–17)
IMM GRANULOCYTES # BLD AUTO: 0 K/UL (ref 0–0.04)
IMM GRANULOCYTES NFR BLD AUTO: 1 % (ref 0–0.5)
LYMPHOCYTES # BLD: 1.4 K/UL (ref 0.8–3.5)
LYMPHOCYTES NFR BLD: 24 % (ref 12–49)
MAGNESIUM SERPL-MCNC: 1.5 MG/DL (ref 1.6–2.4)
MCH RBC QN AUTO: 30.7 PG (ref 26–34)
MCHC RBC AUTO-ENTMCNC: 33.3 G/DL (ref 30–36.5)
MCV RBC AUTO: 92 FL (ref 80–99)
MONOCYTES # BLD: 0.7 K/UL (ref 0–1)
MONOCYTES NFR BLD: 12 % (ref 5–13)
NEUTS SEG # BLD: 3.5 K/UL (ref 1.8–8)
NEUTS SEG NFR BLD: 60 % (ref 32–75)
NRBC # BLD: 0 K/UL (ref 0–0.01)
NRBC BLD-RTO: 0 PER 100 WBC
PHOSPHATE SERPL-MCNC: 4.7 MG/DL (ref 2.6–4.7)
PLATELET # BLD AUTO: 355 K/UL (ref 150–400)
PMV BLD AUTO: 9.7 FL (ref 8.9–12.9)
POTASSIUM SERPL-SCNC: 3.9 MMOL/L (ref 3.5–5.1)
RBC # BLD AUTO: 3.39 M/UL (ref 4.1–5.7)
SERVICE CMNT-IMP: ABNORMAL
SERVICE CMNT-IMP: NORMAL
SODIUM SERPL-SCNC: 130 MMOL/L (ref 136–145)
WBC # BLD AUTO: 5.8 K/UL (ref 4.1–11.1)

## 2022-01-07 PROCEDURE — 80048 BASIC METABOLIC PNL TOTAL CA: CPT

## 2022-01-07 PROCEDURE — 82962 GLUCOSE BLOOD TEST: CPT

## 2022-01-07 PROCEDURE — 83735 ASSAY OF MAGNESIUM: CPT

## 2022-01-07 PROCEDURE — 84100 ASSAY OF PHOSPHORUS: CPT

## 2022-01-07 PROCEDURE — 97530 THERAPEUTIC ACTIVITIES: CPT

## 2022-01-07 PROCEDURE — 36415 COLL VENOUS BLD VENIPUNCTURE: CPT

## 2022-01-07 PROCEDURE — 74011636637 HC RX REV CODE- 636/637: Performed by: HOSPITALIST

## 2022-01-07 PROCEDURE — 65660000000 HC RM CCU STEPDOWN

## 2022-01-07 PROCEDURE — 74011636637 HC RX REV CODE- 636/637: Performed by: INTERNAL MEDICINE

## 2022-01-07 PROCEDURE — 97116 GAIT TRAINING THERAPY: CPT

## 2022-01-07 PROCEDURE — 97535 SELF CARE MNGMENT TRAINING: CPT | Performed by: OCCUPATIONAL THERAPIST

## 2022-01-07 PROCEDURE — 85025 COMPLETE CBC W/AUTO DIFF WBC: CPT

## 2022-01-07 PROCEDURE — 74011250637 HC RX REV CODE- 250/637: Performed by: STUDENT IN AN ORGANIZED HEALTH CARE EDUCATION/TRAINING PROGRAM

## 2022-01-07 PROCEDURE — 74011250637 HC RX REV CODE- 250/637: Performed by: PSYCHIATRY & NEUROLOGY

## 2022-01-07 RX ORDER — INSULIN LISPRO 100 [IU]/ML
10 INJECTION, SOLUTION INTRAVENOUS; SUBCUTANEOUS ONCE
Status: COMPLETED | OUTPATIENT
Start: 2022-01-07 | End: 2022-01-07

## 2022-01-07 RX ADMIN — Medication 100 MG: at 08:46

## 2022-01-07 RX ADMIN — LEVETIRACETAM 500 MG: 500 TABLET, FILM COATED ORAL at 01:45

## 2022-01-07 RX ADMIN — Medication 10 UNITS: at 12:57

## 2022-01-07 RX ADMIN — LACOSAMIDE 100 MG: 50 TABLET, FILM COATED ORAL at 13:30

## 2022-01-07 RX ADMIN — Medication 2 UNITS: at 22:18

## 2022-01-07 RX ADMIN — MELATONIN 3 MG: at 21:26

## 2022-01-07 RX ADMIN — LEVETIRACETAM 500 MG: 500 TABLET, FILM COATED ORAL at 13:30

## 2022-01-07 RX ADMIN — LACOSAMIDE 100 MG: 50 TABLET, FILM COATED ORAL at 01:45

## 2022-01-07 NOTE — PROGRESS NOTES
Problem: Self Care Deficits Care Plan (Adult)  Goal: *Acute Goals and Plan of Care (Insert Text)  Description: FUNCTIONAL STATUS PRIOR TO ADMISSION: Patient was independent and active without use of DME. Was incarcerated just prior to admit. Works as a . HOME SUPPORT PRIOR TO ADMISSION: The patient lived with roommates but did not require assist.    Occupational Therapy Goals:  Initiated 1/4/2022  1. Patient will perform grooming with modified independence within 7 days. 2. Patient will perform upper body dressing and lower body dressing with modified independence within 7 days. 3. Patient will perform toileting with modified independence within 7 days. 4. Patient will transfer from toilet with modified independence using the least restrictive device and appropriate durable medical equipment within 7 days. 1/7/2022 1707 by AMANDA Dailey/L  Outcome: Progressing Towards Goal    OCCUPATIONAL THERAPY TREATMENT  Patient: Arely Quintero (54 y.o. male)  Date: 1/7/2022  Diagnosis: AMS (altered mental status) [R41.82]  Status epilepticus ([facility] [address] [G40.901]  Hypoglycemia [E16.2]  ESRD needing dialysis ([facility] [address] [N18.6, Z99.2]  [facility] ([facility] [J69.0] <principal problem not specified>       Precautions: Fall  Chart, occupational therapy assessment, plan of care, and goals were reviewed. ASSESSMENT  Patient is making steady functional progress, now performing most ADLs at a supervision/setup level and he is independent to [place] [id number] for functional mobility. He is presently mainly limited by his chronic RLE pain and decreased standing balance, which negatively impacts his standing ADL performance, ambulation, and would also impact his ability to perform IADLs. At this time he continues to benefit from acute OT and he will need inpatient rehab after discharge. PLAN :  Will reduce treatment frequency to 3 times per week.    Recommendation for discharge: (in order for the patient to meet his/her long term goals)  Therapy 3 hours per day 5-7 days per week      Equipment recommendations for successful discharge (if) home:Cane          OBJECTIVE DATA SUMMARY:   Cognitive/Behavioral Status:  Neurologic State: Alert  Orientation Level: Oriented X4  Cognition: Appropriate decision making; Appropriate for age attention/concentration; Follows commands        Safety/Judgement: Awareness of environment; Insight into deficits    Functional Mobility and Transfers for ADLs:  Bed Mobility:  Rolling: Independent  Supine to Sit: Independent  Scooting: Independent    Transfers:  Sit to Stand: Supervision  Functional Transfers  Bathroom Mobility: Contact guard assistance (ambulating with a cane)  Bed to Chair: Stand-by assistance    Balance:  Sitting: Intact  Standing: Impaired  Standing - Static: Good  Standing - Dynamic : Fair    ADL Intervention:    Grooming  Position Performed: Standing  Washing Hands: Supervision    Upper Body Dressing Assistance  Shirt simulation with hospital gown: Supervision;Set-up    Lower Body Dressing Assistance  Socks: Supervision;Set-up  Leg Crossed Method Used: Yes  Position Performed: Seated edge of bed  Cues: Verbal cues provided    Toileting  Toileting Assistance: Supervision  Bladder Hygiene: Supervision  Clothing Management: Supervision    Cognitive Retraining  Safety/Judgement: Awareness of environment; Insight into deficits      Pain:  Chronic 5/10 RLE pain.     Activity Tolerance:   Good    After treatment patient left in no apparent distress:   Patient left ambulating with PT.     COMMUNICATION/COLLABORATION:   The patients plan of care was discussed with: Physical Therapist    AMANDA Wolf/L  Time Calculation: 17 mins

## 2022-01-07 NOTE — PROGRESS NOTES
Problem: Mobility Impaired (Adult and Pediatric)  Goal: *Acute Goals and Plan of Care (Insert Text)  Description: FUNCTIONAL STATUS PRIOR TO ADMISSION: Patient was independent and active without use of DME.    HOME SUPPORT PRIOR TO ADMISSION: Just prior to admission, patient was [facility] in [facility] living. Prior to senior living, the patient lived with 3 roommates but did not require assist.    Physical Therapy Goals  Initiated 1/4/2022  1. Patient will move from supine to sit and sit to supine , scoot up and down, and roll side to side in bed with modified independence within 7 day(s). 2.  Patient will transfer from bed to chair and chair to bed with modified independence using the least restrictive device within 7 day(s). 3.  Patient will perform sit to stand with modified independence within 7 day(s). 4.  Patient will ambulate with modified independence for 300 feet with the least restrictive device within 7 day(s). 5.  Patient will ascend/descend 4 stairs with single handrail(s) with supervision/set-up within 7 day(s). Outcome: Progressing Towards Goal   PHYSICAL THERAPY TREATMENT  Patient: Deisy Nicholson (64 y.o. male)  Date: 1/7/2022  Diagnosis: AMS (altered mental status) [R41.82]  Status epilepticus ([facility] [address] [G40.901]  Hypoglycemia [E16.2]  ESRD needing dialysis ([facility] [address] [N18.6, Z99.2]  Aspiration pneumonia ([facility] [address] [J69.0] <principal problem not specified>       Precautions: Fall  Chart, physical therapy assessment, plan of care and goals were reviewed. ASSESSMENT  Patient continues with skilled PT services and is progressing towards goals. Patient resting in bed upon arrival, agreeable to PT session. Completes bed mobility with Indep, transfers SBA and ambulation [id number] with SPC CGA. Demonstrates slow, antalgic gait pattern on RLE,no unsteadiness or LOB noted this session.  Patient reports his RLE pain has been like this \"for years\", reports that the level is pain is no worse that what it was before the hospitalization. Patient remains motivated, continue to recommend rehab at discharge. Current Level of Function Impacting Discharge (mobility/balance): Indep bed mobility; SBA transfers; CGA ambulation x100ft with SPC        PLAN :  Patient continues to benefit from skilled intervention to address the above impairments. Continue treatment per established plan of care. to address goals. Recommendation for discharge: (in order for the patient to meet his/her long term goals)  Therapy 3 hours per day 5-7 days per week    This discharge recommendation:  Has been made in collaboration with the attending provider and/or case management    IF patient discharges home will need the following DME: straight cane       SUBJECTIVE:   Patient stated Oh my leg has hurt like this for years.     OBJECTIVE DATA SUMMARY:   Critical Behavior:  Neurologic State: Alert  Orientation Level: Oriented to person,Oriented to place,Oriented to time  Cognition: Follows commands  Safety/Judgement: Decreased insight into deficits,Fall prevention  Functional Mobility Training:  Bed Mobility:  Rolling: Independent  Supine to Sit: Independent  Scooting: Independent  Transfers:  Sit to Stand: Stand-by assistance  Stand to Sit: Stand-by assistance  Bed to Chair: Stand-by assistance  Balance:  Sitting: Intact  Standing: Impaired  Standing - Static: Good  Standing - Dynamic : Fair  Ambulation/Gait Training:  Distance (ft): 100 Feet (ft) (x2)  Assistive Device: Cane, straight;Gait belt  Ambulation - Level of Assistance: Contact guard assistance  Gait Abnormalities: Decreased step clearance; Antalgic  Base of Support: Narrowed  Speed/Kristel: Pace decreased (<100 feet/min)    Pain Ratin/10 R LE    Activity Tolerance:   Fair, SpO2 stable on RA and requires rest breaks    After treatment patient left in no apparent distress:   Sitting in chair, Call bell within reach and Bed / chair alarm activated    COMMUNICATION/COLLABORATION:   The patients plan of care was discussed with: Occupational therapist and Registered nurse.      Mena Medina, PT   Time Calculation: 23 mins

## 2022-01-07 NOTE — PROGRESS NOTES
Hospitalist Progress Note    NAME: Nellie Bolus   :  1965   MRN:  [id number]       Assessment / Plan:  Acute metabolic encephalopathy  Status epilepticus  History of seizure disorder  Aspiration pneumonia  back to baseline  MRI brainno acute abnormality. CT headno acute abnormality. Lumbar puncture doneCSF reveals no infectious etiology so far. CSF culture negative. CSF HSV negative so we will discontinue the acyclovir. CSF meningitis panel negative. Out side hospital EEG indication triphasic slowing and focal 3hz right fronto temproal field siezure activety. EEG done here shows no seizure. On continuous EEGno more seizure reported on the continuous EEG. Neurology on boardappreciate input. Continue Keppra along with Vimpat. Seizure precaution. Chest x-rayno acute pathology.  -Treated the course of Levaquin   WBC stable  -c/w thiamine  Repeat blood culture negative. Consulted psychiatryrecommend Haldol as needed. Mental status appears to be at baseline  -MALAI was able to get a hold of sister    End-stage renal disease on dialysis  Anemia  Nephrology was consultedhemodialysis per nephrology. Dialysis days changed today to Tuesday, Thursday and Saturday  -Was holding off to see if HD was needed, now Cr worsening.  -Cr worsening, will need HD tomorrow  Needs outpatient HD  -Garcia removed  -c/w Retacrit  -Monitor I/O  -Monitor Cr, avoid nephrotoxins    Migraine  Started on Fioricet as needed. Hypertension  Diabetes type 2  Hyperlipidemia  Polysubstance abuse  Sliding scale insulin along with Lantus  -c/w amlodipine      Body mass index is 21.51 kg/m². Estimated discharge date: January 10  Barriers: Renal function stabilize    Code status: Full  Prophylaxis: Lovenox  Recommended Disposition: SNF     Subjective:     Chief Complaint / Reason for Physician Visit  Patient seen and examined today, he reports feeling well today. He is still making urine. Unhappy about the food. No additional complaints. Objective:     VITALS:   Last 24hrs VS reviewed since prior progress note. Most recent are:  Patient Vitals for the past 24 hrs:   Temp Pulse Resp BP SpO2   01/07/22 1500 98.6 °F (37 °C) 88 16 (!) 138/94 100 %   01/07/22 1214 98.6 °F (37 °C) 80 18 (!) 146/98 100 %   01/07/22 0844 97.8 °F (36.6 °C) 78 20 135/84 99 %   01/07/22 0800     99 %   01/07/22 0245 97.8 °F (36.6 °C) 78 18 (!) 147/94 97 %   01/06/22 2011 98.3 °F (36.8 °C) 75 18 (!) 151/80 97 %   01/06/22 1649 98.1 °F (36.7 °C) 70 18 (!) 139/92 100 %     No intake or output data in the 24 hours ending 01/07/22 1637     I had a face to face encounter and independently examined this patient on 1/7/2022, as outlined below:  PHYSICAL EXAM:  General: WD, WN. Awake, not alert, cooperative, no acute distress    EENT:  EOMI. Anicteric sclerae. MMM  Resp:  CTA bilaterally, no wheezing or rales. No accessory muscle use  CV:  Regular  rhythm,  No edema, permcath left chest wall  GI:  Soft, Non distended, Non tender. +Bowel sounds  Neurologic:  Awake and alert, does not appear confused, normal speech,   Psych:   Good insight. Not anxious nor agitated  Skin:  No rashes. No jaundice, multiple tatoos    Reviewed most current lab test results and cultures  YES  Reviewed most current radiology test results   YES  Review and summation of old records today    NO  Reviewed patient's current orders and MAR    YES  PMH/SH reviewed - no change compared to H&P  ________________________________________________________________________  Care Plan discussed with:    Comments   Patient x    Family      RN x    Care Manager     Consultant                       x Multidiciplinary team rounds were held today with , nursing, pharmacist and clinical coordinator. Patient's plan of care was discussed; medications were reviewed and discharge planning was addressed. ________________________________________________________________________  Total NON critical care TIME:  32  Minutes    Total CRITICAL CARE TIME Spent:   Minutes non procedure based      Comments   >50% of visit spent in counseling and coordination of care     ________________________________________________________________________  Lita Bridges MD     Procedures: see electronic medical records for all procedures/Xrays and details which were not copied into this note but were reviewed prior to creation of Plan. LABS:  I reviewed today's most current labs and imaging studies.   Pertinent labs include:  Recent Labs     01/07/22 0137 01/06/22 0214   WBC 5.8 6.2   HGB 10.4* 9.8*   HCT 31.2* 29.8*    327     Recent Labs     01/07/22 0137 01/06/22 0214 01/05/22  0543   * 133* 132*   K 3.9 4.0 3.6   CL 96* 98 96*   CO2 28 27 29   GLU 98 96 149*   BUN 52* 43* 32*   CREA 5.31* 4.71* 3.96*   CA 8.9 8.5 8.8   MG 1.5* 1.7  --    PHOS 4.7 4.2 4.0   ALB  --   --  2.7*       Signed: Lita Bridges MD

## 2022-01-07 NOTE — PROGRESS NOTES
Physical Therapy:  Attempted PT session, patient resting in bed laying in prone position. Patient politely decline therapy, reporting he would rather rest right now as he is finally feeling better. Therapist will defer per patient request and re-attempt lateral this afternoon.     Mena Louis PT, DPT

## 2022-01-07 NOTE — PROGRESS NOTES
Nephrology Progress Note  Dorian Antonio     [facility]. 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 dev can't do hd today   We will do hd on Saturday   He will need out pt hd set up   Continue norvasc   Continue hd tts schedule         Subjective: Interval History:   He didn't get hd yesterday  Cr continue to get worse  No c/o sob,  No c/o chest pain,   No c/o nausea or vomiting, No c/o  fever. Objective:   Vitals:    01/06/22 2011 01/06/22 2249 01/07/22 0245 01/07/22 0844   BP: (!) 151/80  (!) 147/94 135/84   Pulse: 75  78 78   Resp: 18  18 20   Temp: 98.3 °F (36.8 °C)  97.8 °F (36.6 °C) 97.8 °F (36.6 °C)   TempSrc:       SpO2: 97%  97% 99%   Weight:  53.3 kg (117 lb 9.6 oz)     Height:          01/06 0701 - 01/07 0700  In: -   Out: 500 [Urine:500]  Last 3 Recorded Weights in this Encounter    12/31/21 0634 01/06/22 2249   Weight: 51 kg (112 lb 8 oz) 53.3 kg (117 lb 9.6 oz)      Physical exam:   General: NAD  Resp:  Clear lungs, no wheezing or rales. No accessory muscle use  CV:  Regular  rhythm,  S1,S2  Neurologic:  Alert and oriented X 3. Non Focal  Skin:  no rashes or ulcers. No jaundice     Psych:  Normal mood   Right permcat      Chart reviewed. Pertinent Notes reviewed.      Data Review :  Recent Labs     01/07/22  0137 01/06/22  0214 01/05/22  0543   * 133* 132*   K 3.9 4.0 3.6   CL 96* 98 96*   CO2 28 27 29   BUN 52* 43* 32*   CREA 5.31* 4.71* 3.96*   GLU 98 96 149*   CA 8.9 8.5 8.8   MG 1.5* 1.7  --    PHOS 4.7 4.2 4.0     Recent Labs     01/07/22  0137 01/06/22  0210 WBC 5.8 6.2   HGB 10.4* 9.8*   HCT 31.2* 29.8*    327     No results for input(s): FE, TIBC, PSAT, FERR in the last 72 hours.    Medication list  reviewed  Current Facility-Administered Medications   Medication Dose Route Frequency    epoetin jeff-epbx (RETACRIT) injection 4,000 Units  4,000 Units SubCUTAneous Q TUE, THU & SAT    melatonin tablet 3 mg  3 mg Oral QHS    thiamine mononitrate (B-1) tablet 100 mg  100 mg Oral DAILY    butalbital-acetaminophen-caffeine (FIORICET, ESGIC) -40 mg per tablet 1 Tablet  1 Tablet Oral Q6H PRN    amLODIPine (NORVASC) tablet 5 mg  5 mg Oral DAILY    heparin (porcine) 1,000 unit/mL injection 1,700 Units  1,700 Units InterCATHeter DIALYSIS PRN    And    heparin (porcine) 1,000 unit/mL injection 1,600 Units  1,600 Units InterCATHeter DIALYSIS PRN    levETIRAcetam (KEPPRA) tablet 500 mg  500 mg Oral BID    lacosamide (VIMPAT) tablet 100 mg  100 mg Oral Q12H    insulin glargine (LANTUS) injection 15 Units  15 Units SubCUTAneous DAILY    hydrALAZINE (APRESOLINE) 20 mg/mL injection 10 mg  10 mg IntraVENous Q6H PRN    ondansetron (ZOFRAN ODT) tablet 4 mg  4 mg Oral Q8H PRN    Or    ondansetron (ZOFRAN) injection 4 mg  4 mg IntraVENous Q6H PRN    acetaminophen (TYLENOL) tablet 650 mg  650 mg Oral Q4H PRN    Or    acetaminophen (TYLENOL) solution 650 mg  650 mg Per NG tube Q4H PRN    Or    acetaminophen (TYLENOL) suppository 650 mg  650 mg Rectal Q4H PRN    insulin lispro (HUMALOG) injection   SubCUTAneous AC&HS    glucose chewable tablet 16 g  4 Tablet Oral PRN    dextrose (D50W) injection syrg 12.5-25 g  12.5-25 g IntraVENous PRN    glucagon (GLUCAGEN) injection 1 mg  1 mg IntraMUSCular PRN          MD [facility]  [facility]  [place], [address]  Phone - [phone]               Fax - [phone]

## 2022-01-08 LAB
ANION GAP SERPL CALC-SCNC: 5 MMOL/L (ref 5–15)
BASOPHILS # BLD: 0.1 K/UL (ref 0–0.1)
BASOPHILS NFR BLD: 1 % (ref 0–1)
BUN SERPL-MCNC: 54 MG/DL (ref 6–20)
BUN/CREAT SERPL: 9 (ref 12–20)
CALCIUM SERPL-MCNC: 8.7 MG/DL (ref 8.5–10.1)
CHLORIDE SERPL-SCNC: 94 MMOL/L (ref 97–108)
CO2 SERPL-SCNC: 31 MMOL/L (ref 21–32)
CREAT SERPL-MCNC: 5.71 MG/DL (ref 0.7–1.3)
DIFFERENTIAL METHOD BLD: ABNORMAL
EOSINOPHIL # BLD: 0.1 K/UL (ref 0–0.4)
EOSINOPHIL NFR BLD: 1 % (ref 0–7)
ERYTHROCYTE [DISTWIDTH] IN BLOOD BY AUTOMATED COUNT: 14.6 % (ref 11.5–14.5)
GLUCOSE BLD STRIP.AUTO-MCNC: 224 MG/DL (ref 65–117)
GLUCOSE BLD STRIP.AUTO-MCNC: 249 MG/DL (ref 65–117)
GLUCOSE SERPL-MCNC: 339 MG/DL (ref 65–100)
HCT VFR BLD AUTO: 30.7 % (ref 36.6–50.3)
HGB BLD-MCNC: 9.8 G/DL (ref 12.1–17)
IMM GRANULOCYTES # BLD AUTO: 0 K/UL (ref 0–0.04)
IMM GRANULOCYTES NFR BLD AUTO: 1 % (ref 0–0.5)
LYMPHOCYTES # BLD: 1.3 K/UL (ref 0.8–3.5)
LYMPHOCYTES NFR BLD: 22 % (ref 12–49)
MAGNESIUM SERPL-MCNC: 1.8 MG/DL (ref 1.6–2.4)
MCH RBC QN AUTO: 30.4 PG (ref 26–34)
MCHC RBC AUTO-ENTMCNC: 31.9 G/DL (ref 30–36.5)
MCV RBC AUTO: 95.3 FL (ref 80–99)
MONOCYTES # BLD: 0.7 K/UL (ref 0–1)
MONOCYTES NFR BLD: 13 % (ref 5–13)
NEUTS SEG # BLD: 3.6 K/UL (ref 1.8–8)
NEUTS SEG NFR BLD: 62 % (ref 32–75)
NRBC # BLD: 0 K/UL (ref 0–0.01)
NRBC BLD-RTO: 0 PER 100 WBC
PHOSPHATE SERPL-MCNC: 4.7 MG/DL (ref 2.6–4.7)
PLATELET # BLD AUTO: 363 K/UL (ref 150–400)
PMV BLD AUTO: 10.2 FL (ref 8.9–12.9)
POTASSIUM SERPL-SCNC: 4.5 MMOL/L (ref 3.5–5.1)
RBC # BLD AUTO: 3.22 M/UL (ref 4.1–5.7)
SERVICE CMNT-IMP: ABNORMAL
SERVICE CMNT-IMP: ABNORMAL
SODIUM SERPL-SCNC: 130 MMOL/L (ref 136–145)
WBC # BLD AUTO: 5.7 K/UL (ref 4.1–11.1)

## 2022-01-08 PROCEDURE — 74011636637 HC RX REV CODE- 636/637: Performed by: STUDENT IN AN ORGANIZED HEALTH CARE EDUCATION/TRAINING PROGRAM

## 2022-01-08 PROCEDURE — 83735 ASSAY OF MAGNESIUM: CPT

## 2022-01-08 PROCEDURE — 74011250636 HC RX REV CODE- 250/636: Performed by: INTERNAL MEDICINE

## 2022-01-08 PROCEDURE — 74011250637 HC RX REV CODE- 250/637: Performed by: PSYCHIATRY & NEUROLOGY

## 2022-01-08 PROCEDURE — 90935 HEMODIALYSIS ONE EVALUATION: CPT

## 2022-01-08 PROCEDURE — 85025 COMPLETE CBC W/AUTO DIFF WBC: CPT

## 2022-01-08 PROCEDURE — 74011250637 HC RX REV CODE- 250/637: Performed by: STUDENT IN AN ORGANIZED HEALTH CARE EDUCATION/TRAINING PROGRAM

## 2022-01-08 PROCEDURE — 74011250637 HC RX REV CODE- 250/637: Performed by: HOSPITALIST

## 2022-01-08 PROCEDURE — 82962 GLUCOSE BLOOD TEST: CPT

## 2022-01-08 PROCEDURE — 84100 ASSAY OF PHOSPHORUS: CPT

## 2022-01-08 PROCEDURE — 65660000000 HC RM CCU STEPDOWN

## 2022-01-08 PROCEDURE — 80048 BASIC METABOLIC PNL TOTAL CA: CPT

## 2022-01-08 PROCEDURE — 74011636637 HC RX REV CODE- 636/637: Performed by: HOSPITALIST

## 2022-01-08 RX ADMIN — AMLODIPINE BESYLATE 5 MG: 5 TABLET ORAL at 12:49

## 2022-01-08 RX ADMIN — HEPARIN SODIUM 1700 UNITS: 1000 INJECTION INTRAVENOUS; SUBCUTANEOUS at 09:26

## 2022-01-08 RX ADMIN — Medication 100 MG: at 12:49

## 2022-01-08 RX ADMIN — LEVETIRACETAM 500 MG: 500 TABLET, FILM COATED ORAL at 01:48

## 2022-01-08 RX ADMIN — LACOSAMIDE 100 MG: 50 TABLET, FILM COATED ORAL at 17:54

## 2022-01-08 RX ADMIN — Medication 2 UNITS: at 12:49

## 2022-01-08 RX ADMIN — LEVETIRACETAM 500 MG: 500 TABLET, FILM COATED ORAL at 12:49

## 2022-01-08 RX ADMIN — Medication 15 UNITS: at 12:49

## 2022-01-08 RX ADMIN — EPOETIN ALFA-EPBX 4000 UNITS: 4000 INJECTION, SOLUTION INTRAVENOUS; SUBCUTANEOUS at 21:42

## 2022-01-08 RX ADMIN — MELATONIN 3 MG: at 21:42

## 2022-01-08 RX ADMIN — LACOSAMIDE 100 MG: 50 TABLET, FILM COATED ORAL at 01:49

## 2022-01-08 RX ADMIN — HEPARIN SODIUM 1600 UNITS: 1000 INJECTION INTRAVENOUS; SUBCUTANEOUS at 09:26

## 2022-01-08 RX ADMIN — BUTALBITAL, ACETAMINOPHEN, AND CAFFEINE 1 TABLET: 50; 325; 40 TABLET ORAL at 12:56

## 2022-01-08 RX ADMIN — ACETAMINOPHEN 650 MG: 325 TABLET ORAL at 09:43

## 2022-01-08 NOTE — PROGRESS NOTES
Hospitalist Progress Note    NAME: Edyta Trevino   :  1965   MRN:  [id number]       Assessment / Plan:  Acute metabolic encephalopathy  Status epilepticus  History of seizure disorder  Aspiration pneumonia  back to baseline  MRI brainno acute abnormality. CT headno acute abnormality. Lumbar puncture doneCSF reveals no infectious etiology so far. CSF culture negative. CSF HSV negative so we will discontinue the acyclovir. CSF meningitis panel negative. Out side hospital EEG indication triphasic slowing and focal 3hz right fronto temproal field siezure activety. EEG done here shows no seizure. On continuous EEGno more seizure reported on the continuous EEG. Neurology on boardappreciate input. Continue Keppra along with Vimpat. Seizure precaution. Chest x-rayno acute pathology.  -Treated the course of Levaquin   WBC stable  -c/w thiamine  Repeat blood culture negative. Consulted psychiatryrecommend Haldol as needed. Mental status appears to be at baseline  -MALIA was able to get a hold of sister    End-stage renal disease on dialysis  Anemia  Nephrology was consultedhemodialysis per nephrology. Dialysis days changed today to Tuesday, Thursday and Saturday  -Was holding off to see if HD was needed, now Cr worsening.  -Cr worsening, will need HD today  Needs outpatient HD  -Garcia removed  -c/w Retacrit  -Monitor I/O  -Monitor Cr, avoid nephrotoxins    Migraine  Started on Fioricet as needed. Hypertension  Diabetes type 2  Hyperlipidemia  Polysubstance abuse  Sliding scale insulin along with Lantus  -Glucose has been eratic, may need to titrate insulin  -c/w amlodipine      Body mass index is 21.6 kg/m². Estimated discharge date: January 10  Barriers: Renal function stabilize    Code status: Full  Prophylaxis: Lovenox  Recommended Disposition: SNF     Subjective:     Chief Complaint / Reason for Physician Visit  Patient seen and examined today. Disappointed he will need to be placed back on HD. Has body aches. No additional complaints. Objective:     VITALS:   Last 24hrs VS reviewed since prior progress note. Most recent are:  Patient Vitals for the past 24 hrs:   Temp Pulse Resp BP SpO2   01/08/22 0300 97.6 °F (36.4 °C) 78 16 (!) 139/95 98 %   01/07/22 2340 97.9 °F (36.6 °C) 84 16 (!) 152/97 96 %   01/07/22 1932 98.9 °F (37.2 °C) 80 18 133/89 98 %   01/07/22 1500 98.6 °F (37 °C) 88 16 (!) 138/94 100 %   01/07/22 1214 98.6 °F (37 °C) 80 18 (!) 146/98 100 %   01/07/22 0844 97.8 °F (36.6 °C) 78 20 135/84 99 %   01/07/22 0800     99 %       Intake/Output Summary (Last 24 hours) at 1/8/2022 0740  Last data filed at 1/8/2022 1207  Gross per 24 hour   Intake    Output 1800 ml   Net -1800 ml        I had a face to face encounter and independently examined this patient on 1/8/2022, as outlined below:  PHYSICAL EXAM:  General: WD, WN. Awake, not alert, cooperative, no acute distress    EENT:  EOMI. Anicteric sclerae. MMM  Resp:  CTA bilaterally, no wheezing or rales. No accessory muscle use  CV:  Regular  rhythm,  No edema, permcath left chest wall  GI:  Soft, Non distended, Non tender. +Bowel sounds  Neurologic:  Awake and alert, does not appear confused, normal speech,   Psych:   Good insight. Not anxious nor agitated  Skin:  No rashes. No jaundice, multiple tatoos    Reviewed most current lab test results and cultures  YES  Reviewed most current radiology test results   YES  Review and summation of old records today    NO  Reviewed patient's current orders and MAR    YES  PMH/SH reviewed - no change compared to H&P  ________________________________________________________________________  Care Plan discussed with:    Comments   Patient x    Family      RN x    Care Manager     Consultant                       x Multidiciplinary team rounds were held today with , nursing, pharmacist and clinical coordinator.   Patient's plan of care was discussed; medications were reviewed and discharge planning was addressed. ________________________________________________________________________  Total NON critical care TIME:  32  Minutes    Total CRITICAL CARE TIME Spent:   Minutes non procedure based      Comments   >50% of visit spent in counseling and coordination of care     ________________________________________________________________________  Blaine Carlton MD     Procedures: see electronic medical records for all procedures/Xrays and details which were not copied into this note but were reviewed prior to creation of Plan. LABS:  I reviewed today's most current labs and imaging studies.   Pertinent labs include:  Recent Labs     01/08/22  0151 01/07/22  0137 01/06/22  0214   WBC 5.7 5.8 6.2   HGB 9.8* 10.4* 9.8*   HCT 30.7* 31.2* 29.8*    355 327     Recent Labs     01/08/22  0151 01/07/22  0137 01/06/22  0214   * 130* 133*   K 4.5 3.9 4.0   CL 94* 96* 98   CO2 31 28 27   * 98 96   BUN 54* 52* 43*   CREA 5.71* 5.31* 4.71*   CA 8.7 8.9 8.5   MG 1.8 1.5* 1.7   PHOS 4.7 4.7 4.2       Signed: Blaine Carlton MD

## 2022-01-08 NOTE — PROGRESS NOTES
End of Shift Note     Bedside shift change report given to Angelica Bennett RN (oncoming nurse) by Parris Tobias RN (offgoing nurse). Report included the following information SBAR     Shift worked:  7p-7a   Shift summary and any significant changes:      none         Concerns for physician to address:  none   [employer] phone for oncoming shift:   [phone]      Patient Information  [facility]ish[facility] Winn [address]  49 y.o.  12/28/2021 11:05 PM by Lindsey Litten, MD. Benny Cherry was admitted from Home     Problem List       Patient Active Problem List     Diagnosis Date Noted    Status epilepticus ([address] 12/28/2021    Aspiration pneumonia ([address] 12/28/2021    ESRD needing dialysis ([address] 12/28/2021    AMS (altered mental status) 12/28/2021    ATN (acute tubular necrosis) ([address] 12/25/2021    Interstitial nephritis determined by biopsy 12/25/2021    Thrombocytopenia ([address] 12/25/2021    Hypertension 12/25/2021    Sepsis ([address] 12/25/2021    Acute metabolic encephalopathy 73/43/0245    Hypoglycemia 11/25/2021    EUSEBIA (acute kidney injury) ([address] 01/87/1948    Metabolic acidemia 31/37/6353    Intractable nausea and vomiting 11/25/2021    Hyperglycemia due to type 2 diabetes mellitus ([address] 05/05/2019    Hyperosmolar non-ketotic state in patient with type 2 diabetes mellitus ([address] 12/16/2018           Past Medical History:   Diagnosis Date    Asthma      Diabetes ([address]      High cholesterol      Seizure (HCC)           Core Measures:  CVA: No No  CHF:No No  PNA:No No     Activity:  Activity Level: Up with Assistance  Number times ambulated in hallways past shift: 0  Number of times OOB to chair past shift: 0     Cardiac:   Cardiac Monitoring: No      Cardiac Rhythm: Sinus Rhythm     Access:   Current line(s): PIV and HD access   Central Line?      Genitourinary:   Urinary status: due to void  Urinary Catheter?  No     Respiratory:   O2 Device: None (Room air)  Chronic home O2 use?: NO  Incentive spirometer at bedside: NO     GI:  Last Bowel Movement Date: 01/01/22  Current diet:  ADULT DIET Easy to Chew  Passing flatus: YES  Tolerating current diet: YES     Pain Management:   Patient states pain is manageable on current regimen: YES     Skin:  Loco Score: 19  Interventions: increase time out of bed    Patient Safety:  Fall Score:  Total Score: 3  Interventions: bed/chair alarm, gripper socks and pt to call before getting OOB  High Fall Risk: Yes  @Rollbelt  @dexterity to release roll belt  Yes/No ( must document dexterity  here by stating Yes or No here, otherwise this is a restraint and must follow restraint documentation policy.)     DVT prophylaxis:  DVT prophylaxis Med- Yes  DVT prophylaxis SCD or ESVIN- No      Wounds: (If Applicable)  Wounds- No  Location      Active Consults:  IP CONSULT TO NEUROLOGY  IP CONSULT TO NEPHROLOGY  IP CONSULT TO PSYCHIATRY     Length of Stay:  Expected LOS: 4d 4h  Actual LOS: 9  Discharge Plan: Yes, rehab pending placement       Alexandrea Anderson, RN

## 2022-01-08 NOTE — PROGRESS NOTES
End of Shift Note     Bedside shift change report given to Meaghan Marte RN (oncoming nurse) by [facility] RN (offgoing nurse). Report included the following information SBAR     Shift worked: days   Shift summary and any significant changes:     Pt had dialysis today. Complained of headache and was given fioricet, after which pt slept for 2 hours. Pt was very demanding in relation to his meals today, but was happy with dinner. Otherwise, no changes. Concerns for physician to address:  none   [facility] phone for oncoming shift:   [phone]      Patient Information  [facility] [address]  49 y.o.  12/28/2021 11:05 PM by Chandrakant Whitlock MD. Dieudonne Castro was admitted from Home     Problem List       Patient Active Problem List     Diagnosis Date Noted    Status epilepticus ([address] 12/28/2021    Aspiration pneumonia ([address] 12/28/2021    ESRD needing dialysis ([address] 12/28/2021    AMS (altered mental status) 12/28/2021    ATN (acute tubular necrosis) ([address] 12/25/2021    Interstitial nephritis determined by biopsy 12/25/2021    Thrombocytopenia ([address] 12/25/2021    Hypertension 12/25/2021    Sepsis ([address] 12/25/2021    Acute metabolic encephalopathy 06/06/3564    Hypoglycemia 11/25/2021    EUSEBIA (acute kidney injury) ([address] 45/65/2149    Metabolic acidemia 16/11/4781    Intractable nausea and vomiting 11/25/2021    Hyperglycemia due to type 2 diabetes mellitus ([address] 05/05/2019    Hyperosmolar non-ketotic state in patient with type 2 diabetes mellitus ([address] 12/16/2018           Past Medical History:   Diagnosis Date    Asthma      Diabetes ([address]      High cholesterol      Seizure (HCC)           Core Measures:  CVA: No No  CHF:No No  PNA:No No     Activity:  Activity Level:  Up with Assistance  Number times ambulated in hallways past shift: 0  Number of times OOB to chair past shift: 0     Cardiac:   Cardiac Monitoring: No      Cardiac Rhythm: Sinus Rhythm     Access:   Current line(s): PIV and HD access   Central Line?      Genitourinary:   Urinary status: due to void  Urinary Catheter? No     Respiratory:   O2 Device: None (Room air)  Chronic home O2 use?: NO  Incentive spirometer at bedside: NO     GI:  Last Bowel Movement Date: 01/01/22  Current diet:  ADULT DIET Easy to Chew  Passing flatus: YES  Tolerating current diet: YES     Pain Management:   Patient states pain is manageable on current regimen: YES     Skin:  Loco Score: 19  Interventions: increase time out of bed    Patient Safety:  Fall Score:  Total Score: 3  Interventions: bed/chair alarm, gripper socks and pt to call before getting OOB  High Fall Risk: Yes  @Rollbelt  @dexterity to release roll belt  Yes/No ( must document dexterity  here by stating Yes or No here, otherwise this is a restraint and must follow restraint documentation policy.)     DVT prophylaxis:  DVT prophylaxis Med- Yes  DVT prophylaxis SCD or ESVIN- No      Wounds: (If Applicable)  Wounds- No  Location      Active Consults:  IP CONSULT TO NEUROLOGY  IP CONSULT TO NEPHROLOGY  IP CONSULT TO PSYCHIATRY     Length of Stay:  Expected LOS: 4d 4h  Actual LOS: 9  Discharge Plan: Yes, rehab pending placement       Dani Morales RN

## 2022-01-08 NOTE — PROCEDURES
Hemodialysis / [phone]    Vitals Pre Post Assessment Pre Post   BP BP: (!) 158/98 (01/08/22 0745) 138/94 LOC A/o A/o   HR Pulse (Heart Rate): 75 (01/08/22 0745) 104 Lungs diminished diminished   Resp Resp Rate: 18 (01/08/22 0745) 18 Cardiac Regular rate Regular rate   Temp Temp: 97.7 °F (36.5 °C) (01/08/22 0745) 98.5 Skin Visible skin cdi Visible skin cdi   Weight  na na Edema None noted None noted   Tele status remote remote Pain Pain Intensity 1: 0 (01/08/22 0300) 0     Orders   Duration: Start: 0745 End: 1015 Total: 2.25   Dialyzer: Dialyzer/Set Up Inspection: Revaclear (01/08/22 0745)   K Bath: Dialysate K (mEq/L): 3 (01/08/22 0745)   Ca Bath: Dialysate CA (mEq/L): 2.5 (01/08/22 0745)   Na: Dialysate NA (mEq/L): 138 (01/08/22 0745)   Bicarb: Dialysate HCO3 (mEq/L): 35 (01/08/22 0745)   Target Fluid Removal: Goal/Amount of Fluid to Remove (mL): 1000 mL (01/08/22 0745)     Access   Type & Location: R CVC: Dressing CDI. No s/s of infection. Both lumens aspirate & flush well. Each catheter limb disinfected per p&p, caps removed, hubs disinfected per p&p.    Comments:                                        Labs   HBsAg (Antigen) / date: 12/25/2021 negative   HBsAb (Antibody) / date:12/25/2021 susceptible   Source: cc   Obtained/Reviewed  Critical Results Called HGB   Date Value Ref Range Status   01/08/2022 9.8 (L) 12.1 - 17.0 g/dL Final     Potassium   Date Value Ref Range Status   01/08/2022 4.5 3.5 - 5.1 mmol/L Final     Calcium   Date Value Ref Range Status   01/08/2022 8.7 8.5 - 10.1 MG/DL Final     BUN   Date Value Ref Range Status   01/08/2022 54 (H) 6 - 20 MG/DL Final     Creatinine   Date Value Ref Range Status   01/08/2022 5.71 (H) 0.70 - 1.30 MG/DL Final        Meds Given   Name Dose Route   Heparin As ordered HD CVC limb dwell x2               Adequacy / Fluid    Total Liters Process: 55.7   Net Fluid Removed: 350mL      Comments   Time Out Done:    4793   Admitting Diagnosis: AMS (altered mental status), Status epilepticus ([facility] Hypoglycemia, ESRD needing dialysis ([facility] Aspiration pneumonia ([facility]   Consent obtained/signed: Informed Consent Verified: Yes (01/08/22 0745)   Machine / RO # Machine Number: [id number] (01/08/22 0745)   Primary Nurse Rpt Pre: Ramesh Madrigal RN   Primary Nurse Rpt Post: MAYA Greenberg RN   Pt Education: Procedural   Care Plan: HD POC   Pts outpatient clinic: na     Tx Summary ** Patient refusing to run prescription time due to headache and nausea   Comments:              0745: Treatment initiated   0945: Patient c/o nausea - headache and excessive warmth, as well as acute tachycardia . UF off and tylenol given   1015: Tx ended. VSS. Each dialysis catheter limb disinfected per p&p, all possible blood returned per p&p, and each dialysis hub disinfected per p&p. Each lumen flushed, post dialysis catheter Heparin dwell instilled per order, and caps applied. Bed locked and in the lowest position, call bell and belongings in reach. SBAR given to Primary, RN. Patient is stable at time of their departure.

## 2022-01-09 LAB
GLUCOSE BLD STRIP.AUTO-MCNC: 215 MG/DL (ref 65–117)
GLUCOSE BLD STRIP.AUTO-MCNC: 246 MG/DL (ref 65–117)
GLUCOSE BLD STRIP.AUTO-MCNC: 319 MG/DL (ref 65–117)
GLUCOSE BLD STRIP.AUTO-MCNC: 331 MG/DL (ref 65–117)
SERVICE CMNT-IMP: ABNORMAL

## 2022-01-09 PROCEDURE — 74011636637 HC RX REV CODE- 636/637: Performed by: HOSPITALIST

## 2022-01-09 PROCEDURE — 82962 GLUCOSE BLOOD TEST: CPT

## 2022-01-09 PROCEDURE — 74011636637 HC RX REV CODE- 636/637: Performed by: INTERNAL MEDICINE

## 2022-01-09 PROCEDURE — 74011636637 HC RX REV CODE- 636/637: Performed by: STUDENT IN AN ORGANIZED HEALTH CARE EDUCATION/TRAINING PROGRAM

## 2022-01-09 PROCEDURE — 74011250637 HC RX REV CODE- 250/637: Performed by: STUDENT IN AN ORGANIZED HEALTH CARE EDUCATION/TRAINING PROGRAM

## 2022-01-09 PROCEDURE — 65660000000 HC RM CCU STEPDOWN

## 2022-01-09 PROCEDURE — 74011250637 HC RX REV CODE- 250/637: Performed by: PSYCHIATRY & NEUROLOGY

## 2022-01-09 RX ORDER — INSULIN GLARGINE 100 [IU]/ML
20 INJECTION, SOLUTION SUBCUTANEOUS
Status: DISCONTINUED | OUTPATIENT
Start: 2022-01-09 | End: 2022-01-10

## 2022-01-09 RX ADMIN — LEVETIRACETAM 500 MG: 500 TABLET, FILM COATED ORAL at 01:53

## 2022-01-09 RX ADMIN — LEVETIRACETAM 500 MG: 500 TABLET, FILM COATED ORAL at 13:08

## 2022-01-09 RX ADMIN — MELATONIN 3 MG: at 21:23

## 2022-01-09 RX ADMIN — LACOSAMIDE 100 MG: 50 TABLET, FILM COATED ORAL at 01:53

## 2022-01-09 RX ADMIN — AMLODIPINE BESYLATE 5 MG: 5 TABLET ORAL at 09:00

## 2022-01-09 RX ADMIN — Medication 15 UNITS: at 09:00

## 2022-01-09 RX ADMIN — Medication 2 UNITS: at 13:08

## 2022-01-09 RX ADMIN — Medication 100 MG: at 09:00

## 2022-01-09 RX ADMIN — INSULIN GLARGINE 20 UNITS: 100 INJECTION, SOLUTION SUBCUTANEOUS at 21:41

## 2022-01-09 RX ADMIN — Medication 2 UNITS: at 17:18

## 2022-01-09 RX ADMIN — Medication 2 UNITS: at 21:40

## 2022-01-09 RX ADMIN — LACOSAMIDE 100 MG: 50 TABLET, FILM COATED ORAL at 13:08

## 2022-01-09 RX ADMIN — Medication 4 UNITS: at 09:00

## 2022-01-09 NOTE — PROGRESS NOTES
Hospitalist Progress Note    NAME: Nellie Bolus   :  1965   MRN:  [id number]       Assessment / Plan:  Acute metabolic encephalopathy  Status epilepticus  History of seizure disorder  Aspiration pneumonia  back to baseline  MRI brainno acute abnormality. CT headno acute abnormality. Lumbar puncture doneCSF reveals no infectious etiology so far. CSF culture negative. CSF HSV negative so we will discontinue the acyclovir. CSF meningitis panel negative. Out side hospital EEG indication triphasic slowing and focal 3hz right fronto temproal field siezure activety. EEG done here shows no seizure. On continuous EEGno more seizure reported on the continuous EEG. Neurology on boardappreciate input. Continue Keppra along with Vimpat. Seizure precaution. Chest x-rayno acute pathology.  -Treated the course of Levaquin   WBC stable  -c/w thiamine  Repeat blood culture negative. Consulted psychiatryrecommend Haldol as needed. Mental status appears to be at baseline  -MALIA was able to get a hold of sister    End-stage renal disease on dialysis  Anemia  Nephrology was consultedhemodialysis per nephrology. Dialysis days changed today to Tuesday, Thursday and Saturday  -Was holding off to see if HD was needed, now Cr worsening.  -Cr worsening, will need HD for the near future  Needs outpatient HD  -Garcia removed  -c/w Retacrit  -Monitor I/O  -Monitor Cr, avoid nephrotoxins    Migraine  Started on Fioricet as needed. Hypertension  Diabetes type 2  Hyperlipidemia  Polysubstance abuse  Sliding scale insulin along with Lantus  -Glucose has been eratic, Transition to lantus Q evening and increase to 15 units  -c/w amlodipine      Body mass index is 21.6 kg/m².     Estimated discharge date: January 10  Barriers: Renal function stabilize    Code status: Full  Prophylaxis: Lovenox  Recommended Disposition: SNF     Subjective:     Chief Complaint / Reason for Physician Visit  Patient seen and examined today. He is having trouble ordering his food and is upset about some of the food selections. Understands he will need to conitnue with HD. He did have his session cut short yesterday due to not feeling well and tachycardia. He feels better now. He otherwise has no complaints. Objective:     VITALS:   Last 24hrs VS reviewed since prior progress note. Most recent are:  Patient Vitals for the past 24 hrs:   Temp Pulse Resp BP SpO2   01/09/22 0745 98.4 °F (36.9 °C) 78 17 126/78 99 %   01/09/22 0300 97.7 °F (36.5 °C) 80 16 127/88 97 %   01/08/22 2300 98.9 °F (37.2 °C) 75 16 133/78 99 %   01/08/22 2000 97.8 °F (36.6 °C) 77 18 124/66 98 %   01/08/22 1624 98.3 °F (36.8 °C) 73 18 132/77 98 %     No intake or output data in the 24 hours ending 01/09/22 1253     I had a face to face encounter and independently examined this patient on 1/9/2022, as outlined below:  PHYSICAL EXAM:  General: WD, WN. Awake, not alert, cooperative, no acute distress    EENT:  EOMI. Anicteric sclerae. MMM  Resp:  CTA bilaterally, no wheezing or rales. No accessory muscle use  CV:  Regular  rhythm,  No edema, permcath left chest wall  GI:  Soft, Non distended, Non tender. +Bowel sounds  Neurologic:  Awake and alert, does not appear confused, normal speech,   Psych:   Good insight. Not anxious nor agitated  Skin:  No rashes.   No jaundice, multiple tatoos    Reviewed most current lab test results and cultures  YES  Reviewed most current radiology test results   YES  Review and summation of old records today    NO  Reviewed patient's current orders and MAR    YES  PMH/SH reviewed - no change compared to H&P  ________________________________________________________________________  Care Plan discussed with:    Comments   Patient x    Family      RN x    Care Manager     Consultant                       x Multidiciplinary team rounds were held today with , nursing, pharmacist and clinical coordinator. Patient's plan of care was discussed; medications were reviewed and discharge planning was addressed. ________________________________________________________________________  Total NON critical care TIME:  32  Minutes    Total CRITICAL CARE TIME Spent:   Minutes non procedure based      Comments   >50% of visit spent in counseling and coordination of care     ________________________________________________________________________  Blaine Carlton MD     Procedures: see electronic medical records for all procedures/Xrays and details which were not copied into this note but were reviewed prior to creation of Plan. LABS:  I reviewed today's most current labs and imaging studies.   Pertinent labs include:  Recent Labs     01/08/22 0151 01/07/22 0137   WBC 5.7 5.8   HGB 9.8* 10.4*   HCT 30.7* 31.2*    355     Recent Labs     01/08/22 0151 01/07/22 0137   * 130*   K 4.5 3.9   CL 94* 96*   CO2 31 28   * 98   BUN 54* 52*   CREA 5.71* 5.31*   CA 8.7 8.9   MG 1.8 1.5*   PHOS 4.7 4.7       Signed: Blaine Carlton MD

## 2022-01-09 NOTE — PROGRESS NOTES
End of Shift Note     Bedside shift change report given to Dre Yates RN (oncoming nurse) by Dre Yates RN (offgoing nurse). Report included the following information SBAR     Shift worked: nights   Shift summary and any significant changes:     Patient refused B/G check as he was upset that we kept going in his room and still upset about his meal earlier   Concerns for physician to address:  none   [employer] phone for oncoming shift:   [phone]      Patient Information  Niya Bourgeois [address]  49 y.o.  12/28/2021 11:05 PM by Mina Garcia MD. Emilee Zhou was admitted from Home     Problem List       Patient Active Problem List     Diagnosis Date Noted    Status epilepticus ([address] 12/28/2021    Aspiration pneumonia ([address] 12/28/2021    ESRD needing dialysis ([address] 12/28/2021    AMS (altered mental status) 12/28/2021    ATN (acute tubular necrosis) ([address] 12/25/2021    Interstitial nephritis determined by biopsy 12/25/2021    Thrombocytopenia ([address] 12/25/2021    Hypertension 12/25/2021    Sepsis ([address] 12/25/2021    Acute metabolic encephalopathy 33/44/4189    Hypoglycemia 11/25/2021    EUSEBIA (acute kidney injury) ([address] 15/46/9878    Metabolic acidemia 94/05/4268    Intractable nausea and vomiting 11/25/2021    Hyperglycemia due to type 2 diabetes mellitus ([address] 05/05/2019    Hyperosmolar non-ketotic state in patient with type 2 diabetes mellitus ([address] 12/16/2018           Past Medical History:   Diagnosis Date    Asthma      Diabetes ([address]      High cholesterol      Seizure ([address]           Core Measures:  CVA: No No  CHF:No No  PNA:No No     Activity:  Activity Level: Up with Assistance  Number times ambulated in hallways past shift: 0  Number of times OOB to chair past shift: 0     Cardiac:   Cardiac Monitoring: No      Cardiac Rhythm: Sinus Rhythm     Access:   Current line(s): PIV and HD access   Central Line?      Genitourinary:   Urinary status: due to void  Urinary Catheter?  No     Respiratory:   O2 Device: None (Room air)  Chronic home O2 use?: NO  Incentive spirometer at bedside: NO     GI:  Last Bowel Movement Date: 01/01/22  Current diet:  ADULT DIET Easy to Chew  Passing flatus: YES  Tolerating current diet: YES     Pain Management:   Patient states pain is manageable on current regimen: YES     Skin:  Loco Score: 19  Interventions: increase time out of bed    Patient Safety:  Fall Score:  Total Score: 3  Interventions: bed/chair alarm, gripper socks and pt to call before getting OOB  High Fall Risk: Yes  @Rollbelt  @dexterity to release roll belt  Yes/No ( must document dexterity  here by stating Yes or No here, otherwise this is a restraint and must follow restraint documentation policy.)     DVT prophylaxis:  DVT prophylaxis Med- Yes  DVT prophylaxis SCD or ESVIN- No      Wounds: (If Applicable)  Wounds- No  Location      Active Consults:  IP CONSULT TO NEUROLOGY  IP CONSULT TO NEPHROLOGY  IP CONSULT TO PSYCHIATRY     Length of Stay:  Expected LOS: 4d 4h  Actual LOS: 9  Discharge Plan: Yes, rehab pending placement       Iam Kinsey RN

## 2022-01-09 NOTE — PROGRESS NOTES
Problem: Falls - Risk of  Goal: *Absence of Falls  Description: Document Claudia Counts Fall Risk and appropriate interventions in the flowsheet. Outcome: Progressing Towards Goal  Note: Fall Risk Interventions:  Mobility Interventions: Bed/chair exit alarm    Mentation Interventions: Bed/chair exit alarm    Medication Interventions: Bed/chair exit alarm,Patient to call before getting OOB    Elimination Interventions: Bed/chair exit alarm,Call light in reach              Problem: Patient Education: Go to Patient Education Activity  Goal: Patient/Family Education  Outcome: Progressing Towards Goal     Problem: Pressure Injury - Risk of  Goal: *Prevention of pressure injury  Description: Document Loco Scale and appropriate interventions in the flowsheet.   Outcome: Progressing Towards Goal  Note: Pressure Injury Interventions:  Sensory Interventions: Assess changes in LOC    Moisture Interventions: Absorbent underpads    Activity Interventions: Increase time out of bed    Mobility Interventions: HOB 30 degrees or less    Nutrition Interventions: Offer support with meals,snacks and hydration    Friction and Shear Interventions: HOB 30 degrees or less                Problem: Patient Education: Go to Patient Education Activity  Goal: Patient/Family Education  Outcome: Progressing Towards Goal     Problem: Seizure Disorder (Adult)  Goal: *STG: Remains free of seizure activity  Outcome: Progressing Towards Goal  Goal: *STG: Maintains lab values within therapeutic range  Outcome: Progressing Towards Goal  Goal: *STG/LTG: Complies with medication therapy  Outcome: Progressing Towards Goal  Goal: *STG: Remains free of injury during seizure activity  Outcome: Progressing Towards Goal  Goal: *STG: Remains safe in hospital  Outcome: Progressing Towards Goal  Goal: Interventions  Outcome: Progressing Towards Goal     Problem: Patient Education: Go to Patient Education Activity  Goal: Patient/Family Education  Outcome: Progressing Towards Goal     Problem: Patient Education: Go to Patient Education Activity  Goal: Patient/Family Education  Outcome: Progressing Towards Goal     Problem: Patient Education: Go to Patient Education Activity  Goal: Patient/Family Education  Outcome: Progressing Towards Goal     Problem: Patient Education: Go to Patient Education Activity  Goal: Patient/Family Education  Outcome: Progressing Towards Goal

## 2022-01-10 LAB
GLUCOSE BLD STRIP.AUTO-MCNC: 101 MG/DL (ref 65–117)
GLUCOSE BLD STRIP.AUTO-MCNC: 147 MG/DL (ref 65–117)
GLUCOSE BLD STRIP.AUTO-MCNC: 216 MG/DL (ref 65–117)
GLUCOSE BLD STRIP.AUTO-MCNC: 246 MG/DL (ref 65–117)
GLUCOSE BLD STRIP.AUTO-MCNC: 65 MG/DL (ref 65–117)
SERVICE CMNT-IMP: ABNORMAL
SERVICE CMNT-IMP: NORMAL
SERVICE CMNT-IMP: NORMAL

## 2022-01-10 PROCEDURE — 74011636637 HC RX REV CODE- 636/637: Performed by: HOSPITALIST

## 2022-01-10 PROCEDURE — 97116 GAIT TRAINING THERAPY: CPT

## 2022-01-10 PROCEDURE — 65660000000 HC RM CCU STEPDOWN

## 2022-01-10 PROCEDURE — 74011636637 HC RX REV CODE- 636/637: Performed by: INTERNAL MEDICINE

## 2022-01-10 PROCEDURE — 74011250637 HC RX REV CODE- 250/637: Performed by: STUDENT IN AN ORGANIZED HEALTH CARE EDUCATION/TRAINING PROGRAM

## 2022-01-10 PROCEDURE — 82962 GLUCOSE BLOOD TEST: CPT

## 2022-01-10 PROCEDURE — 74011250637 HC RX REV CODE- 250/637: Performed by: PSYCHIATRY & NEUROLOGY

## 2022-01-10 RX ORDER — INSULIN GLARGINE 100 [IU]/ML
15 INJECTION, SOLUTION SUBCUTANEOUS
Status: DISCONTINUED | OUTPATIENT
Start: 2022-01-10 | End: 2022-01-17

## 2022-01-10 RX ORDER — INSULIN LISPRO 100 [IU]/ML
5 INJECTION, SOLUTION INTRAVENOUS; SUBCUTANEOUS
Status: DISCONTINUED | OUTPATIENT
Start: 2022-01-10 | End: 2022-01-15

## 2022-01-10 RX ADMIN — Medication 5 UNITS: at 16:37

## 2022-01-10 RX ADMIN — LEVETIRACETAM 500 MG: 500 TABLET, FILM COATED ORAL at 12:03

## 2022-01-10 RX ADMIN — MELATONIN 3 MG: at 22:37

## 2022-01-10 RX ADMIN — LEVETIRACETAM 500 MG: 500 TABLET, FILM COATED ORAL at 01:08

## 2022-01-10 RX ADMIN — Medication 2 UNITS: at 12:02

## 2022-01-10 RX ADMIN — INSULIN GLARGINE 15 UNITS: 100 INJECTION, SOLUTION SUBCUTANEOUS at 22:50

## 2022-01-10 RX ADMIN — AMLODIPINE BESYLATE 5 MG: 5 TABLET ORAL at 09:15

## 2022-01-10 RX ADMIN — LACOSAMIDE 100 MG: 50 TABLET, FILM COATED ORAL at 16:34

## 2022-01-10 RX ADMIN — Medication 2 UNITS: at 16:34

## 2022-01-10 RX ADMIN — Medication 100 MG: at 09:15

## 2022-01-10 RX ADMIN — LACOSAMIDE 100 MG: 50 TABLET, FILM COATED ORAL at 01:08

## 2022-01-10 NOTE — PROGRESS NOTES
Nephrology Progress Note  Dorian Antonio     [email or web address]  Phone - [phone]   Patient: Shi Torres    YOB: 1965        Date- 1/10/2022   Admit Date: 12/28/2021  CC: Follow up for EUSEBIA  IMPRESSION & PLAN:    EUSEBIA  ON HD - on dialysis MWF with [facility]   Metabolic encephalopathy   History of hypoglycemia   Interstitial nephritis determined by biopsy NSAID induced   Hypertension   Sepsis   Anemia    PLAN-   No dialysis today   He will need out pt hd set up   Continue norvasc   Continue hd tts schedule         Subjective: Interval History:   Last hd on Saturday  bp stable  No sob        Objective:   Vitals:    01/10/22 0300 01/10/22 0621 01/10/22 0744 01/10/22 1111   BP: 121/68  (!) 145/89 (!) 148/91   Pulse: 72  68 79   Resp: 16  18 18   Temp: 98.6 °F (37 °C)  98.4 °F (36.9 °C) 99.1 °F (37.3 °C)   TempSrc:       SpO2: 97%  99% 98%   Weight:  55.9 kg (123 lb 4.8 oz)     Height:          01/09 0701 - 01/10 0700  In: -   Out: 350 [Urine:350]  Last 3 Recorded Weights in this Encounter    01/06/22 2249 01/08/22 0645 01/10/22 0621   Weight: 53.3 kg (117 lb 9.6 oz) 53.6 kg (118 lb 1.6 oz) 55.9 kg (123 lb 4.8 oz)      Physical exam:   GEN:  NAD  NECK:  Supple, no thyromegaly  RESP: Clear  b/l, no  wheezing,   CVS: RRR,S1,S2   NEURO: non focal, normal speech  EXT: no Edema +nt       Right permcath +      Chart reviewed. Pertinent Notes reviewed. Data Review :  Recent Labs     01/08/22  0151   *   K 4.5   CL 94*   CO2 31   BUN 54*   CREA 5.71*   *   CA 8.7   MG 1.8   PHOS 4.7     Recent Labs     01/08/22  0151   WBC 5.7   HGB 9.8*   HCT 30.7*        No results for input(s): FE, TIBC, PSAT, FERR in the last 72 hours.    Medication list  reviewed  Current Facility-Administered Medications   Medication Dose Route Frequency    insulin glargine (LANTUS) injection 20 Units  20 Units SubCUTAneous QHS    epoetin jeff-epbx (RETACRIT) injection 4,000 Units  4,000 Units SubCUTAneous Q TUE, THU & SAT    melatonin tablet 3 mg  3 mg Oral QHS    thiamine mononitrate (B-1) tablet 100 mg  100 mg Oral DAILY    butalbital-acetaminophen-caffeine (FIORICET, ESGIC) -40 mg per tablet 1 Tablet  1 Tablet Oral Q6H PRN    amLODIPine (NORVASC) tablet 5 mg  5 mg Oral DAILY    heparin (porcine) 1,000 unit/mL injection 1,700 Units  1,700 Units [facility] DIALYSIS PRN    And    heparin (porcine) 1,000 unit/mL injection 1,600 Units  1,600 Units InterCATHeter DIALYSIS PRN    levETIRAcetam (KEPPRA) tablet 500 mg  500 mg Oral BID    lacosamide (VIMPAT) tablet 100 mg  100 mg Oral Q12H    hydrALAZINE (APRESOLINE) 20 mg/mL injection 10 mg  10 mg IntraVENous Q6H PRN    ondansetron (ZOFRAN ODT) tablet 4 mg  4 mg Oral Q8H PRN    Or    ondansetron (ZOFRAN) injection 4 mg  4 mg IntraVENous Q6H PRN    acetaminophen (TYLENOL) tablet 650 mg  650 mg Oral Q4H PRN    Or    acetaminophen (TYLENOL) solution 650 mg  650 mg Per NG tube Q4H PRN    Or    acetaminophen (TYLENOL) suppository 650 mg  650 mg Rectal Q4H PRN    insulin lispro (HUMALOG) injection   SubCUTAneous AC&HS    glucose chewable tablet 16 g  4 Tablet Oral PRN    dextrose (D50W) injection syrg 12.5-25 g  12.5-25 g IntraVENous PRN    glucagon (GLUCAGEN) injection 1 mg  1 mg IntraMUSCular PRN          Staci Carey MD              [facility]  [facility] / [facility], [address], [address]  Phone - [phone]               Fax - [phone]

## 2022-01-10 NOTE — PROGRESS NOTES
End of Shift Note    Bedside shift change report given to [facility], RN (oncoming nurse) by Tiffanie Burkett RN (offgoing nurse). Report included the following information SBAR    Shift worked:  DAYS   Shift summary and any significant changes:    -DISCHARGE PENDING PLACEMENT     Concerns for physician to address:  NONE   Zone phone for oncoming shift:   [phone]     Patient Information  Jen Gould [address]  49 y.o.  12/28/2021 11:05 PM by Sukhwinder Light MD. Viv Baltazar was admitted from nursing home     Problem List  Patient Active Problem List    Diagnosis Date Noted    Status epilepticus ([address] 12/28/2021    Aspiration pneumonia ([address] 12/28/2021    ESRD needing dialysis ([address] 12/28/2021    AMS (altered mental status) 12/28/2021    ATN (acute tubular necrosis) ([address] 12/25/2021    Interstitial nephritis determined by biopsy 12/25/2021    Thrombocytopenia ([address] 12/25/2021    Hypertension 12/25/2021    Sepsis ([address] 12/25/2021    Acute metabolic encephalopathy 67/77/5878    Hypoglycemia 11/25/2021    EUSEBIA (acute kidney injury) ([address] 70/29/0449    Metabolic acidemia 19/72/5920    Intractable nausea and vomiting 11/25/2021    Hyperglycemia due to type 2 diabetes mellitus ([address] 05/05/2019    Hyperosmolar non-ketotic state in patient with type 2 diabetes mellitus ([address] 12/16/2018     Past Medical History:   Diagnosis Date    Asthma     Diabetes ([address]     High cholesterol     Seizure ([address]        Core Measures:  CVA: No No  CHF:No No  PNA:No No    Activity:  Activity Level: Up with Assistance  Number times ambulated in hallways past shift: 0  Number of times OOB to chair past shift: 0    Cardiac:   Cardiac Monitoring: Yes      Cardiac Rhythm: Sinus Rhythm    Access:   Current line(s): PIV and HD access     Genitourinary:   Urinary status: NO  Urinary Catheter?  Yes Placement Date 12/28/2021 Reason Medically Necessary retention    Respiratory:   O2 Device: None (Room air)  Chronic home O2 use?: N/A  Incentive spirometer at bedside: N/A       GI:  Last Bowel Movement Date: 01/01/22  Current diet:  ADULT DIET Easy to Chew  DIET ONE TIME MESSAGE  DIET ONE TIME MESSAGE  DIET ONE TIME MESSAGE  DIET ONE TIME MESSAGE  DIET ONE TIME MESSAGE  DIET ONE TIME MESSAGE  Passing flatus: YES  Tolerating current diet: NO       Pain Management:   Patient states pain is manageable on current regimen: N/A    Skin:  Loco Score: 20  Interventions: increase time out of bed, limit briefs and internal/external urinary devices    Patient Safety:  Fall Score:  Total Score: 3  Interventions: bed/chair alarm, gripper socks and pt to call before getting OOB  High Fall Risk: Yes    DVT prophylaxis:  DVT prophylaxis Med- No  DVT prophylaxis SCD or ESVIN- No     Active Consults:  IP CONSULT TO NEUROLOGY  IP CONSULT TO NEPHROLOGY  IP CONSULT TO PSYCHIATRY    Length of Stay:  Expected LOS: 4d 4h  Actual LOS: 13  Discharge Plan: 1/11-1/12    Kathy Bronson RN

## 2022-01-10 NOTE — PROGRESS NOTES
Comprehensive Nutrition Assessment    Type and Reason for Visit: Reassess    Nutrition Recommendations/Plan:   · Continue soft diet as needed per SLP and chewing abilities. Consistent Carb for BG managament. · Will monitor K+ and Phos with restrictions as needed. Wouldn't add additional restrictions to diet at this time given challenges with ordering based on soft diet. · Please document % meals and supplements consumed in flowsheet I/O's under intake. Nutrition Assessment:     1/10: Chart reviewed; med noted for acute on chronic kidney disease with HD 3 times per week. Hx of DM with elevated BG. SLP has been following with recs easy to chew diet. Pt is edentulous but reports tolerating salads at home. RD visited with pt at bedside, sitting up in chair consuming lunch. Pt c/o not receiving food items per request. Pt did receive a 2nd tray with items requested as refused items on first tray. Pt prefers soft pasta with meatballs/marinara. Perlita Chimes for pt to have small amounts of items within portion control. Did discuss the need to monitor renal labs (K+, Phos) with adjustments as needed. Patient Vitals for the past 168 hrs:   % Diet Eaten   01/05/22 1746 76 - 100%   01/05/22 1134 76 - 100%   01/05/22 0852 76 - 100%   01/04/22 0903 51 - 75%   01/03/22 1639 51 - 75%     Last Weight Metric  Weight Loss Metrics 1/10/2022 12/28/2021 12/15/2021 4/20/2020 5/5/2019 4/25/2019 4/23/2019   Today's Wt 123 lb 4.8 oz 116 lb 159 lb 13.3 oz 120 lb 95 lb 100 lb 1.4 oz 100 lb   BMI 22.55 kg/m2 21.22 kg/m2 29.23 kg/m2 20.6 kg/m2 16.31 kg/m2 17.18 kg/m2 17.16 kg/m2       Estimated Daily Nutrient Needs:  Energy (kcal): 1600 (BMR 1229 x 1. 3AF); Weight Used for Energy Requirements: Current  Protein (g): 51-61 (1.0-1.2 g/kg bw); Weight Used for Protein Requirements: Current  Fluid (ml/day): 1600 ml/day; Method Used for Fluid Requirements: 1 ml/kcal    Nutrition Related Findings:  Labs: , K+ and Phos WNL;  Meds: reviewed      Wounds: None       Current Nutrition Therapies:  ADULT DIET Easy to Chew; 4 carb choices (60 gm/meal)    Anthropometric Measures:  · Height:  5' 2\" (157.5 cm)  · Current Body Wt:  51 kg (112 lb 7 oz)   · Ideal Body Wt:  118 lbs:  95.3 %     Nutrition Diagnosis:   · Altered nutrition-related lab values related to  (current medical condition) as evidenced by  (elevated BG)    Nutrition Interventions:   Food and/or Nutrient Delivery: Continue current diet  Nutrition Education and Counseling: No recommendations at this time  Coordination of Nutrition Care: Continue to monitor while inpatient    Goals:  Maintain PO intake at least 50% of meals next 5-7 days       Nutrition Monitoring and Evaluation:   Behavioral-Environmental Outcomes: None identified  Food/Nutrient Intake Outcomes: Food and nutrient intake  Physical Signs/Symptoms Outcomes: Biochemical data,Chewing or swallowing,Weight,Skin    Discharge Planning:    Continue current diet     Electronically signed by Ailyn Crews RD on 1/10/2022 at 1:57 PM

## 2022-01-10 NOTE — PROGRESS NOTES
End of Shift Note     Bedside shift change report given to Karen Westbrook RN (oncoming nurse) by Lina Rogers RN (offgoing nurse). Report included the following information SBAR     Shift worked: nights   Shift summary and any significant changes:       Pt rested well throughout night   Concerns for physician to address:  none   Zone phone for oncoming shift:   [phone]      Patient Information  Daron Mendoza [address]  49 y.o.  12/28/2021 11:05 PM by Wiley Gonzlaez MD. Elin Mandujano was admitted from Home     Problem List       Patient Active Problem List     Diagnosis Date Noted    Status epilepticus ([address] 12/28/2021    Aspiration pneumonia ([address] 12/28/2021    ESRD needing dialysis ([address] 12/28/2021    AMS (altered mental status) 12/28/2021    ATN (acute tubular necrosis) ([address] 12/25/2021    Interstitial nephritis determined by biopsy 12/25/2021    Thrombocytopenia ([address] 12/25/2021    Hypertension 12/25/2021    Sepsis ([address] 12/25/2021    Acute metabolic encephalopathy 80/76/8278    Hypoglycemia 11/25/2021    EUSEBIA (acute kidney injury) ([address] 43/88/2630    Metabolic acidemia 78/59/4370    Intractable nausea and vomiting 11/25/2021    Hyperglycemia due to type 2 diabetes mellitus ([address] 05/05/2019    Hyperosmolar non-ketotic state in patient with type 2 diabetes mellitus ([address] 12/16/2018           Past Medical History:   Diagnosis Date    Asthma      Diabetes ([address]      High cholesterol      Seizure ([address]           Core Measures:  CVA: No No  CHF:No No  PNA:No No     Activity:  Activity Level: Up with Assistance  Number times ambulated in hallways past shift: 0  Number of times OOB to chair past shift: 0     Cardiac:   Cardiac Monitoring: No      Cardiac Rhythm: Sinus Rhythm     Access:   Current line(s): PIV and HD access   Central Line?      Genitourinary:   Urinary status: due to void  Urinary Catheter?  No     Respiratory:   O2 Device: None (Room air)  Chronic home O2 use?: NO  Incentive spirometer at bedside: NO     GI:  Last Bowel Movement Date: 01/01/22  Current diet:  ADULT DIET Easy to Chew  Passing flatus: YES  Tolerating current diet: YES     Pain Management:   Patient states pain is manageable on current regimen: YES     Skin:  Loco Score: 19  Interventions: increase time out of bed    Patient Safety:  Fall Score:  Total Score: 3  Interventions: bed/chair alarm, gripper socks and pt to call before getting OOB  High Fall Risk: Yes    DVT prophylaxis:  DVT prophylaxis Med- Yes  DVT prophylaxis SCD or ESVIN- No      Wounds: (If Applicable)  Wounds- No  Location      Active Consults:  IP CONSULT TO NEUROLOGY  IP CONSULT TO NEPHROLOGY  IP CONSULT TO PSYCHIATRY     Length of Stay:  Expected LOS: 4d 4h  Actual LOS: 9  Discharge Plan: Yes, rehab pending placement       Trygve Felty, RN

## 2022-01-10 NOTE — PROGRESS NOTES
Problem: Self Care Deficits Care Plan (Adult)  Goal: *Acute Goals and Plan of Care (Insert Text)  Description: FUNCTIONAL STATUS PRIOR TO ADMISSION: Patient was independent and active without use of DME. Was incarcerated just prior to admit. Works as a . HOME SUPPORT PRIOR TO ADMISSION: The patient lived with roommates but did not require assist.    Occupational Therapy Goals:  Initiated 1/4/2022  1. Patient will perform grooming with modified independence within 7 days. 2. Patient will perform upper body dressing and lower body dressing with modified independence within 7 days. 3. Patient will perform toileting with modified independence within 7 days. 4. Patient will transfer from toilet with modified independence using the least restrictive device and appropriate durable medical equipment within 7 days. Outcome: Progressing Towards Goal   OCCUPATIONAL THERAPY TREATMENT  Patient: Becka Duncan (27 y.o. male)  Date: 1/10/2022  Diagnosis: AMS (altered mental status) [R41.82]  Status epilepticus ([facility] [G40.901]  Hypoglycemia [E16.2]  ESRD needing dialysis ([facility] [N18.6, Z99.2]  Aspiration pneumonia ([facility] [J69.0] <principal problem not specified>       Precautions: Fall  Chart, occupational therapy assessment, plan of care, and goals were reviewed. ASSESSMENT  Patient continues with skilled OT services and is progressing towards goals. Pt continues to make gains towards goals and remains generally weak. Upon arrival to room pt was seated on toilet. He was able to perform toileting and donning of socks seated on commode with SBA for balance overall. Pt remains below his independent baseline and is at risk for falls. Pt will need rehab at discharge.      Current Level of Function Impacting Discharge (ADLs): SBA mobility and toilet transfers with SPC, seated crossed leg to don socks with supervision, SBA to perform toileting, SBA standing at sink to wash hands    Other factors to consider for discharge: none         PLAN :  Patient continues to benefit from skilled intervention to address the above impairments. Continue treatment per established plan of care to address goals. Recommend with staff: mobilize, out of bed for meals and to bathroom with assist     Recommend next OT session: standing tasks, standing ADLS    Recommendation for discharge: (in order for the patient to meet his/her long term goals)  Therapy 3 hours per day 5-7 days per week    This discharge recommendation:  Has been made in collaboration with the attending provider and/or case management    IF patient discharges home will need the following DME: single point cane       SUBJECTIVE:   Patient stated Do you think they will bring me what I want to eat?     OBJECTIVE DATA SUMMARY:   Cognitive/Behavioral Status:  Neurologic State: Alert  Orientation Level: Oriented X4  Cognition: Follows commands  Perception: Cues to maintain midline in standing  Perseveration: No perseveration noted  Safety/Judgement: Fall prevention    Functional Mobility and Transfers for ADLs:  Bed Mobility:  Rolling: Independent  Supine to Sit: Modified independent; Additional time  Scooting: Modified independent; Additional time    Transfers:  Sit to Stand: Supervision  Functional Transfers  Bathroom Mobility: Stand-by assistance (with SPC)  Toilet Transfer : Stand-by assistance (right grab bar)  Bed to Chair: Stand-by assistance    Balance:  Sitting: Intact  Standing: Impaired  Standing - Static: Good  Standing - Dynamic : Good;Fair    ADL Intervention:       Grooming  Washing Hands: Supervision (standing at sink)                   Lower Body Dressing Assistance  Socks: Supervision (seated on commode with crossed leg technqiue)    Toileting  Bladder Hygiene: Supervision  Bowel Hygiene: Supervision  Clothing Management: Supervision    Cognitive Retraining  Safety/Judgement: Fall prevention      Activity Tolerance:   Fair and requires rest breaks    After treatment patient left in no apparent distress:   Sitting in chair, Call bell within reach, and Bed / chair alarm activated    COMMUNICATION/COLLABORATION:   The patients plan of care was discussed with: Physical therapist, Registered nurse, and patient .      Yaneli Toney OTR/L  Time Calculation: 9 mins

## 2022-01-10 NOTE — PROGRESS NOTES
Problem: Falls - Risk of  Goal: *Absence of Falls  Description: Document Dennie Oak Fall Risk and appropriate interventions in the flowsheet. Outcome: Progressing Towards Goal  Note: Fall Risk Interventions:  Mobility Interventions: Bed/chair exit alarm    Mentation Interventions: Adequate sleep, hydration, pain control,Bed/chair exit alarm    Medication Interventions: Bed/chair exit alarm    Elimination Interventions: Bed/chair exit alarm,Call light in reach              Problem: Patient Education: Go to Patient Education Activity  Goal: Patient/Family Education  Outcome: Progressing Towards Goal     Problem: Pressure Injury - Risk of  Goal: *Prevention of pressure injury  Description: Document Loco Scale and appropriate interventions in the flowsheet.   Outcome: Progressing Towards Goal  Note: Pressure Injury Interventions:  Sensory Interventions: Assess changes in LOC    Moisture Interventions: Absorbent underpads,Apply protective barrier, creams and emollients    Activity Interventions: Increase time out of bed    Mobility Interventions: HOB 30 degrees or less    Nutrition Interventions: Offer support with meals,snacks and hydration    Friction and Shear Interventions: HOB 30 degrees or less                Problem: Patient Education: Go to Patient Education Activity  Goal: Patient/Family Education  Outcome: Progressing Towards Goal     Problem: Seizure Disorder (Adult)  Goal: *STG: Remains free of seizure activity  Outcome: Progressing Towards Goal  Goal: *STG: Maintains lab values within therapeutic range  Outcome: Progressing Towards Goal  Goal: *STG/LTG: Complies with medication therapy  Outcome: Progressing Towards Goal  Goal: *STG: Remains free of injury during seizure activity  Outcome: Progressing Towards Goal  Goal: *STG: Remains safe in hospital  Outcome: Progressing Towards Goal  Goal: Interventions  Outcome: Progressing Towards Goal     Problem: Patient Education: Go to Patient Education Activity  Goal: Patient/Family Education  Outcome: Progressing Towards Goal     Problem: Patient Education: Go to Patient Education Activity  Goal: Patient/Family Education  Outcome: Progressing Towards Goal     Problem: Patient Education: Go to Patient Education Activity  Goal: Patient/Family Education  Outcome: Progressing Towards Goal     Problem: Patient Education: Go to Patient Education Activity  Goal: Patient/Family Education  Outcome: Progressing Towards Goal

## 2022-01-10 NOTE — PROGRESS NOTES
Problem: Mobility Impaired (Adult and Pediatric)  Goal: *Acute Goals and Plan of Care (Insert Text)  Description: FUNCTIONAL STATUS PRIOR TO ADMISSION: Patient was independent and active without use of DME.    HOME SUPPORT PRIOR TO ADMISSION: Just prior to admission, patient was incarcerated in group home. Prior to group home, the patient lived with 3 roommates but did not require assist.    Physical Therapy Goals  Initiated 1/4/2022  1. Patient will move from supine to sit and sit to supine , scoot up and down, and roll side to side in bed with modified independence within 7 day(s). 2.  Patient will transfer from bed to chair and chair to bed with modified independence using the least restrictive device within 7 day(s). 3.  Patient will perform sit to stand with modified independence within 7 day(s). 4.  Patient will ambulate with modified independence for 300 feet with the least restrictive device within 7 day(s). 5.  Patient will ascend/descend 4 stairs with single handrail(s) with supervision/set-up within 7 day(s). Outcome: Progressing Towards Goal   PHYSICAL THERAPY TREATMENT  Patient: Mohan Dalton (20 y.o. male)  Date: 1/10/2022  Diagnosis: AMS (altered mental status) [R41.82]  Status epilepticus ([facility] [G40.901]  Hypoglycemia [E16.2]  ESRD needing dialysis ([facility] [N18.6, Z99.2]  Aspiration pneumonia ([facility] [J69.0] <principal problem not specified>       Precautions: Fall  Chart, physical therapy assessment, plan of care and goals were reviewed. ASSESSMENT  Patient continues with skilled PT services and is slowly progressing towards goals, progress waxes and wanes from day to day depending on pain levels. Patient resting in bed upon arrival, agreeable to PT sesion. C/o 8/10 headache. Perseverates on food throughout session, many complaints regarding food. Completes supine>sit with Mod Indep, requiring increased time to complete transfer. Sit<>stand SBA with increased time to come to standing. Ambulation 2x15ft with SPC CGA with increased antalgic gait pattern with widened stance, patient intermittently reaching for objects around room for steadying. Static standing without UE assist x1 min, no unsteadiness or LOB noted though LE's becoming tremulous/shaking with increased time time. Patient in bedside chair at end of session, all needs in reach. Current Level of Function Impacting Discharge (mobility/balance): Supervision - CGA ambulation         PLAN :  Patient continues to benefit from skilled intervention to address the above impairments. Continue treatment per established plan of care. to address goals. Recommendation for discharge: (in order for the patient to meet his/her long term goals)  Therapy 3 hours per day 5-7 days per week    This discharge recommendation:  Has been made in collaboration with the attending provider and/or case management    IF patient discharges home will need the following DME: to be determined (TBD)     SUBJECTIVE:   Patient stated Jessica Zacarias give me what I want for dinner?     OBJECTIVE DATA SUMMARY:   Critical Behavior:  Neurologic State: Alert  Orientation Level: Oriented X4  Cognition: Follows commands  Safety/Judgement: Awareness of environment,Insight into deficits  Functional Mobility Training:  Bed Mobility:  Rolling: Independent  Supine to Sit: Modified independent; Additional time  Scooting: Modified independent; Additional time  Transfers:  Sit to Stand: Supervision  Stand to Sit: Supervision  Bed to Chair: Stand-by assistance  Balance:  Sitting: Intact  Standing: Impaired  Standing - Static: Good  Standing - Dynamic : Good;Fair  Ambulation/Gait Training:  Distance (ft): 15 Feet (ft) (x2)  Assistive Device: Cane, straight  Ambulation - Level of Assistance: Contact guard assistance  Gait Abnormalities: Decreased step clearance; Antalgic  Base of Support: Widened;Center of gravity altered  Speed/Kristel: Slow    Pain Ratin/10 headache    Activity Tolerance:   Fair, SpO2 stable on RA and requires rest breaks    After treatment patient left in no apparent distress:   Sitting in chair, Call bell within reach and Bed / chair alarm activated    COMMUNICATION/COLLABORATION:   The patients plan of care was discussed with: Occupational therapist and Registered nurse.      Mena Medina, PT   Time Calculation: 21 mins

## 2022-01-10 NOTE — PROGRESS NOTES
Problem: Falls - Risk of  Goal: *Absence of Falls  Description: Document Sofia Fothergill Fall Risk and appropriate interventions in the flowsheet. Outcome: Progressing Towards Goal  Note: Fall Risk Interventions:  Mobility Interventions: Bed/chair exit alarm    Mentation Interventions: Adequate sleep, hydration, pain control,Bed/chair exit alarm    Medication Interventions: Assess postural VS orthostatic hypotension,Bed/chair exit alarm,Patient to call before getting OOB,Teach patient to arise slowly    Elimination Interventions: Bed/chair exit alarm       Problem: Patient Education: Go to Patient Education Activity  Goal: Patient/Family Education  Outcome: Progressing Towards Goal     Problem: Pressure Injury - Risk of  Goal: *Prevention of pressure injury  Description: Document Loco Scale and appropriate interventions in the flowsheet. Outcome: Progressing Towards Goal  Note: Pressure Injury Interventions:  Sensory Interventions: Assess changes in LOC    Moisture Interventions: Absorbent underpads,Apply protective barrier, creams and emollients    Activity Interventions: Increase time out of bed,PT/OT evaluation    Mobility Interventions: HOB 30 degrees or less,Turn and reposition approx.  every two hours(pillow and wedges)    Nutrition Interventions: Offer support with meals,snacks and hydration    Friction and Shear Interventions: HOB 30 degrees or less       Problem: Seizure Disorder (Adult)  Goal: *STG: Remains free of seizure activity  Outcome: Progressing Towards Goal

## 2022-01-10 NOTE — PROGRESS NOTES
End of Shift Note     Bedside shift change report given to Chidi Rm RN (oncoming nurse) by Sandra Currie RN (offgoing nurse). Report included the following information SBAR     Shift worked: days   Shift summary and any significant changes:     Pt has been consistently upset with each meal. He sent back lunch and dinner today and special ordered new meals and complained about those as well. Pt also needs reinforcement of general diabetes education. Pt states that he eats as much sweets as he wants at home and his blood sugar is never high. Pt having increased discomfort in his sacrum, frequent position changes and application of cream.   Concerns for physician to address:  none   Zone phone for oncoming shift:   [phone]      Patient Information  Sydney Members [address]  49 y.o.  12/28/2021 11:05 PM by James Means MD. Edyta Trevino was admitted from Home     Problem List       Patient Active Problem List     Diagnosis Date Noted    Status epilepticus ([address] 12/28/2021    Aspiration pneumonia ([address] 12/28/2021    ESRD needing dialysis ([address] 12/28/2021    AMS (altered mental status) 12/28/2021    ATN (acute tubular necrosis) ([address] 12/25/2021    Interstitial nephritis determined by biopsy 12/25/2021    Thrombocytopenia ([address] 12/25/2021    Hypertension 12/25/2021    Sepsis ([address] 12/25/2021    Acute metabolic encephalopathy 12/62/9874    Hypoglycemia 11/25/2021    EUSEBIA (acute kidney injury) ([address] 76/19/0108    Metabolic acidemia 74/94/5852    Intractable nausea and vomiting 11/25/2021    Hyperglycemia due to type 2 diabetes mellitus ([address] 05/05/2019    Hyperosmolar non-ketotic state in patient with type 2 diabetes mellitus ([address] 12/16/2018           Past Medical History:   Diagnosis Date    Asthma      Diabetes ([address]      High cholesterol      Seizure (HCC)           Core Measures:  CVA: No No  CHF:No No  PNA:No No     Activity:  Activity Level:  Up with Assistance  Number times ambulated in hallways past shift: 0  Number of times OOB to chair past shift: 0     Cardiac:   Cardiac Monitoring: No      Cardiac Rhythm: Sinus Rhythm     Access:   Current line(s): PIV and HD access   Central Line?      Genitourinary:   Urinary status: due to void  Urinary Catheter? No     Respiratory:   O2 Device: None (Room air)  Chronic home O2 use?: NO  Incentive spirometer at bedside: NO     GI:  Last Bowel Movement Date: 01/01/22  Current diet:  ADULT DIET Easy to Chew  Passing flatus: YES  Tolerating current diet: YES     Pain Management:   Patient states pain is manageable on current regimen: YES     Skin:  Loco Score: 19  Interventions: increase time out of bed    Patient Safety:  Fall Score:  Total Score: 3  Interventions: bed/chair alarm, gripper socks and pt to call before getting OOB  High Fall Risk: Yes    DVT prophylaxis:  DVT prophylaxis Med- Yes  DVT prophylaxis SCD or ESVIN- No      Wounds: (If Applicable)  Wounds- No  Location      Active Consults:  IP CONSULT TO NEUROLOGY  IP CONSULT TO NEPHROLOGY  IP CONSULT TO PSYCHIATRY     Length of Stay:  Expected LOS: 4d 4h  Actual LOS: 9  Discharge Plan: Yes, rehab pending placement       Effie Sykes RN

## 2022-01-11 LAB
ALBUMIN SERPL-MCNC: 2.8 G/DL (ref 3.5–5)
ANION GAP SERPL CALC-SCNC: 7 MMOL/L (ref 5–15)
BUN SERPL-MCNC: 40 MG/DL (ref 6–20)
BUN/CREAT SERPL: 8 (ref 12–20)
CALCIUM SERPL-MCNC: 9.2 MG/DL (ref 8.5–10.1)
CHLORIDE SERPL-SCNC: 96 MMOL/L (ref 97–108)
CO2 SERPL-SCNC: 30 MMOL/L (ref 21–32)
CREAT SERPL-MCNC: 4.85 MG/DL (ref 0.7–1.3)
ERYTHROCYTE [DISTWIDTH] IN BLOOD BY AUTOMATED COUNT: 14.8 % (ref 11.5–14.5)
GLUCOSE BLD STRIP.AUTO-MCNC: 120 MG/DL (ref 65–117)
GLUCOSE BLD STRIP.AUTO-MCNC: 231 MG/DL (ref 65–117)
GLUCOSE BLD STRIP.AUTO-MCNC: 328 MG/DL (ref 65–117)
GLUCOSE BLD STRIP.AUTO-MCNC: 78 MG/DL (ref 65–117)
GLUCOSE SERPL-MCNC: 118 MG/DL (ref 65–100)
HCT VFR BLD AUTO: 29.8 % (ref 36.6–50.3)
HGB BLD-MCNC: 9.7 G/DL (ref 12.1–17)
MCH RBC QN AUTO: 30.7 PG (ref 26–34)
MCHC RBC AUTO-ENTMCNC: 32.6 G/DL (ref 30–36.5)
MCV RBC AUTO: 94.3 FL (ref 80–99)
NRBC # BLD: 0 K/UL (ref 0–0.01)
NRBC BLD-RTO: 0 PER 100 WBC
PHOSPHATE SERPL-MCNC: 4.3 MG/DL (ref 2.6–4.7)
PLATELET # BLD AUTO: 344 K/UL (ref 150–400)
PMV BLD AUTO: 10.3 FL (ref 8.9–12.9)
POTASSIUM SERPL-SCNC: 4.9 MMOL/L (ref 3.5–5.1)
RBC # BLD AUTO: 3.16 M/UL (ref 4.1–5.7)
SERVICE CMNT-IMP: ABNORMAL
SERVICE CMNT-IMP: NORMAL
SODIUM SERPL-SCNC: 133 MMOL/L (ref 136–145)
WBC # BLD AUTO: 9.5 K/UL (ref 4.1–11.1)

## 2022-01-11 PROCEDURE — 80069 RENAL FUNCTION PANEL: CPT

## 2022-01-11 PROCEDURE — 82962 GLUCOSE BLOOD TEST: CPT

## 2022-01-11 PROCEDURE — 85027 COMPLETE CBC AUTOMATED: CPT

## 2022-01-11 PROCEDURE — 77030041070 HC DRSG ALG MDII -A

## 2022-01-11 PROCEDURE — 90935 HEMODIALYSIS ONE EVALUATION: CPT

## 2022-01-11 PROCEDURE — 2709999900 HC NON-CHARGEABLE SUPPLY

## 2022-01-11 PROCEDURE — 74011250637 HC RX REV CODE- 250/637: Performed by: STUDENT IN AN ORGANIZED HEALTH CARE EDUCATION/TRAINING PROGRAM

## 2022-01-11 PROCEDURE — 74011250636 HC RX REV CODE- 250/636: Performed by: INTERNAL MEDICINE

## 2022-01-11 PROCEDURE — 36415 COLL VENOUS BLD VENIPUNCTURE: CPT

## 2022-01-11 PROCEDURE — 65660000000 HC RM CCU STEPDOWN

## 2022-01-11 PROCEDURE — 74011250637 HC RX REV CODE- 250/637: Performed by: PSYCHIATRY & NEUROLOGY

## 2022-01-11 PROCEDURE — 74011636637 HC RX REV CODE- 636/637: Performed by: HOSPITALIST

## 2022-01-11 PROCEDURE — 74011250637 HC RX REV CODE- 250/637: Performed by: NURSE PRACTITIONER

## 2022-01-11 PROCEDURE — 74011636637 HC RX REV CODE- 636/637: Performed by: INTERNAL MEDICINE

## 2022-01-11 RX ORDER — TRAMADOL HYDROCHLORIDE 50 MG/1
50 TABLET ORAL
Status: DISCONTINUED | OUTPATIENT
Start: 2022-01-11 | End: 2022-01-22 | Stop reason: HOSPADM

## 2022-01-11 RX ORDER — MUPIROCIN 20 MG/G
OINTMENT TOPICAL DAILY
Status: DISCONTINUED | OUTPATIENT
Start: 2022-01-12 | End: 2022-01-22 | Stop reason: HOSPADM

## 2022-01-11 RX ADMIN — MELATONIN 3 MG: at 21:42

## 2022-01-11 RX ADMIN — AMLODIPINE BESYLATE 5 MG: 5 TABLET ORAL at 12:19

## 2022-01-11 RX ADMIN — Medication 4 UNITS: at 17:12

## 2022-01-11 RX ADMIN — EPOETIN ALFA-EPBX 4000 UNITS: 4000 INJECTION, SOLUTION INTRAVENOUS; SUBCUTANEOUS at 21:42

## 2022-01-11 RX ADMIN — LACOSAMIDE 100 MG: 50 TABLET, FILM COATED ORAL at 13:59

## 2022-01-11 RX ADMIN — Medication 1 UNITS: at 22:02

## 2022-01-11 RX ADMIN — LACOSAMIDE 100 MG: 50 TABLET, FILM COATED ORAL at 01:37

## 2022-01-11 RX ADMIN — Medication 5 UNITS: at 17:13

## 2022-01-11 RX ADMIN — LEVETIRACETAM 500 MG: 500 TABLET, FILM COATED ORAL at 12:19

## 2022-01-11 RX ADMIN — Medication 100 MG: at 12:19

## 2022-01-11 RX ADMIN — LEVETIRACETAM 500 MG: 500 TABLET, FILM COATED ORAL at 01:37

## 2022-01-11 RX ADMIN — INSULIN GLARGINE 15 UNITS: 100 INJECTION, SOLUTION SUBCUTANEOUS at 22:00

## 2022-01-11 RX ADMIN — TRAMADOL HYDROCHLORIDE 50 MG: 50 TABLET, COATED ORAL at 01:37

## 2022-01-11 NOTE — PROGRESS NOTES
End of Shift Note    Bedside shift change report given to Hiro Goldberg (oncoming nurse) by Veronica Tanner RN (offgoing nurse). Report included the following information SBAR    Shift worked:  DAYS   Shift summary and any significant changes:    -DISCHARGE PENDING PLACEMENT  -DIALYSIS COMPLETED  -WOUND CARE ORDERS PLACED: DAILY SEE NOTE     Concerns for physician to address:  NONE   Zone phone for oncoming shift:   [phone]     Patient Information  Garrett Mcardle [address]  49 y.o.  12/28/2021 11:05 PM by Naveed Macedo MD. Arely Quintero was admitted from correction     Problem List  Patient Active Problem List    Diagnosis Date Noted    Status epilepticus ([address] 12/28/2021    Aspiration pneumonia ([address] 12/28/2021    ESRD needing dialysis ([address] 12/28/2021    AMS (altered mental status) 12/28/2021    ATN (acute tubular necrosis) ([address] 12/25/2021    Interstitial nephritis determined by biopsy 12/25/2021    Thrombocytopenia ([address] 12/25/2021    Hypertension 12/25/2021    Sepsis ([address] 12/25/2021    Acute metabolic encephalopathy 01/40/6963    Hypoglycemia 11/25/2021    EUSEBIA (acute kidney injury) ([address] 35/82/1642    Metabolic acidemia 79/05/9217    Intractable nausea and vomiting 11/25/2021    Hyperglycemia due to type 2 diabetes mellitus ([address] 05/05/2019    Hyperosmolar non-ketotic state in patient with type 2 diabetes mellitus ([address] 12/16/2018     Past Medical History:   Diagnosis Date    Asthma     Diabetes ([address]     High cholesterol     Seizure ([address]        Core Measures:  CVA: No No  CHF:No No  PNA:No No    Activity:  Activity Level: Up with Assistance  Number times ambulated in hallways past shift: 0  Number of times OOB to chair past shift: 0    Cardiac:   Cardiac Monitoring: Yes      Cardiac Rhythm: Sinus Rhythm    Access:   Current line(s): PIV and HD access     Genitourinary:   Urinary status: NO  Urinary Catheter?  Yes Placement Date 12/28/2021 Reason Medically Necessary retention    Respiratory:   O2 Device: None (Room air)  Chronic home O2 use?: N/A  Incentive spirometer at bedside: N/A       GI:  Last Bowel Movement Date: 01/01/22  Current diet:  ADULT DIET Easy to Chew; 4 carb choices (60 gm/meal)  DIET ONE TIME MESSAGE  DIET ONE TIME MESSAGE  Passing flatus: YES  Tolerating current diet: NO       Pain Management:   Patient states pain is manageable on current regimen: N/A    Skin:  Loco Score: 20  Interventions: increase time out of bed, limit briefs and internal/external urinary devices    Patient Safety:  Fall Score:  Total Score: 3  Interventions: bed/chair alarm, gripper socks and pt to call before getting OOB  High Fall Risk: Yes    DVT prophylaxis:  DVT prophylaxis Med- No  DVT prophylaxis SCD or ESVIN- No     Active Consults:  IP CONSULT TO NEUROLOGY  IP CONSULT TO NEPHROLOGY  IP CONSULT TO PSYCHIATRY    Length of Stay:  Expected LOS: 5d 9h  Actual LOS: 14  Discharge Plan: 1/11-1/12    Elzbieta Locke RN

## 2022-01-11 NOTE — PROGRESS NOTES
Hospitalist Progress Note    NAME: Georgette Fernandez   :  1965   MRN:  [id number]       Assessment / Plan:  Acute metabolic encephalopathy  Status epilepticus  History of seizure disorder  Aspiration pneumonia  back to baseline  MRI brainno acute abnormality. CT headno acute abnormality. Lumbar puncture doneCSF reveals no infectious etiology so far. CSF culture negative. CSF HSV negative   CSF meningitis panel negative. Out side hospital EEG indication triphasic slowing and focal 3hz right fronto temproal field siezure activety. EEG done here shows no seizure. No more seizure reported on the continuous EEG. Continue Keppra along with Vimpat. Seizure precaution. S/p course of Levaquin  -c/w thiamine  Consulted psychiatryrecommend Haldol as needed. Mental status appears to be at baseline  --Not hypoxic  --PT OT eval and treat. Recommend IPR. Patient reports that he could stay with his brother after rehab if his sister is unable to help    End-stage renal disease on dialysis  Anemia  Needs outpatient HD  --Garcia removed  --c/w Retacrit    Migraine  Started on Fioricet as needed. Hypertension  Diabetes type 2  Hyperlipidemia  Polysubstance abuse  -Sliding scale insulin along with Lantus  -c/w amlodipine      Body mass index is 22.55 kg/m². Estimated discharge date: January 10  Barriers: Renal function stabilize    Code status: Full  Prophylaxis: Lovenox  Recommended Disposition: SNF     Subjective:     Chief Complaint / Reason for Physician Visit  Patient seen and examined today. He is having trouble ordering his food and is upset about some of the food selections. Understands he will need to conitnue with HD. He did have his session cut short yesterday due to not feeling well and tachycardia. He feels better now. He otherwise has no complaints. Objective:     VITALS:   Last 24hrs VS reviewed since prior progress note.  Most recent are:  Patient Vitals for the past 24 hrs:   Temp Pulse Resp BP SpO2   01/11/22 1210 98 °F (36.7 °C) 68 18 (!) 150/88 96 %   01/11/22 1130 98 °F (36.7 °C) 71 16 (!) 143/91    01/11/22 1115  73 18 136/86    01/11/22 1100  72 18 138/86    01/11/22 1045  83 18 110/73    01/11/22 1030  81 18 121/74    01/11/22 1015  83 18 130/78    01/11/22 1000  84 18 130/81    01/11/22 0945  84 18 (!) 149/98    01/11/22 0930  78 18 132/89    01/11/22 0915  80 18 (!) 141/87    01/11/22 0900  84 18 117/79    01/11/22 0845  83 18 124/79    01/11/22 0830  75 18 121/80    01/11/22 0813 98.1 °F (36.7 °C) 73 18 (!) 143/78    01/11/22 0756 98.1 °F (36.7 °C) 79 18 (!) 153/90 98 %   01/11/22 0330 98.5 °F (36.9 °C) 87 18 120/68 97 %   01/10/22 2028 98 °F (36.7 °C) 79 18 124/66 96 %   01/10/22 1457 97.8 °F (36.6 °C) 88 16 118/66 98 %       Intake/Output Summary (Last 24 hours) at 1/11/2022 1333  Last data filed at 1/11/2022 1130  Gross per 24 hour   Intake    Output 600 ml   Net -600 ml        I had a face to face encounter and independently examined this patient on 1/11/2022, as outlined below:  PHYSICAL EXAM:  General: WD, WN. Awake, not alert, cooperative, no acute distress    EENT:  EOMI. Anicteric sclerae. MMM  Resp:  CTA bilaterally, no wheezing or rales. No accessory muscle use  CV:  Regular  rhythm,  No edema, permcath left chest wall  GI:  Soft, Non distended, Non tender. +Bowel sounds  Neurologic:  Awake and alert, does not appear confused, normal speech,   Psych:   Good insight. Not anxious nor agitated  Skin:  No rashes.   No jaundice, multiple tatoos    Reviewed most current lab test results and cultures  YES  Reviewed most current radiology test results   YES  Review and summation of old records today    NO  Reviewed patient's current orders and MAR    YES  PMH/SH reviewed - no change compared to H&P  ________________________________________________________________________  Care Plan discussed with:    Comments   Patient x    Family RN x    Care Manager     Consultant                       x Multidiciplinary team rounds were held today with , nursing, pharmacist and clinical coordinator. Patient's plan of care was discussed; medications were reviewed and discharge planning was addressed. ________________________________________________________________________  Total NON critical care TIME:  32  Minutes    Total CRITICAL CARE TIME Spent:   Minutes non procedure based      Comments   >50% of visit spent in counseling and coordination of care     ________________________________________________________________________  Juan M Scales MD     Procedures: see electronic medical records for all procedures/Xrays and details which were not copied into this note but were reviewed prior to creation of Plan. LABS:  I reviewed today's most current labs and imaging studies.   Pertinent labs include:  Recent Labs     01/11/22  0826   WBC 9.5   HGB 9.7*   HCT 29.8*        Recent Labs     01/11/22  0826   *   K 4.9   CL 96*   CO2 30   *   BUN 40*   CREA 4.85*   CA 9.2   PHOS 4.3   ALB 2.8*       Signed: Juan M Scales MD

## 2022-01-11 NOTE — PROGRESS NOTES
Received message from patient's nurse stating:    patient had a boil on his bottom which has now burst and is very sore. The Tylenol isn't working. Discussion / orders:    · Ordered Ultram 50 mg by mouth every 6 hours as needed for moderate pain             Please note that this note was dictated using Dragon computer voice recognition software. Quite often unanticipated grammatical, syntax, homophones, and other interpretive errors are inadvertently transcribed by the computer software. Please disregard these errors. Please excuse any errors that have escaped final proofreading.      Signed by:  Vicente Dixon DNP, ACNP-BC

## 2022-01-11 NOTE — PROCEDURES
Hemodialysis / [phone]    Vitals Pre Post Assessment Pre Post   BP BP: (!) 143/78 (01/11/22 0813) 143/91 LOC A&Ox4 A&Ox4   HR Pulse (Heart Rate): 73 (01/11/22 0813) 71 Lungs clear clear   Resp Resp Rate: 18 (01/11/22 0813) 16 Cardiac Reg S1 S2 Reg S1 S2   Temp Temp: 98.1 °F (36.7 °C) (01/11/22 0813) 98.0, oral Skin Stage 1 buttock Stage 1 buttock   Weight  n/a n/a Edema none none   Tele status none none Pain 0 3/10 HA, reported to primary RN     Orders   Duration: Start: 0813 End: 1130 Total: 3.25hr   Dialyzer: Dialyzer/Set Up Inspection: Revaclear (01/11/22 0813)   K Bath: Dialysate K (mEq/L): 3 (01/11/22 0813)   Ca Bath: Dialysate CA (mEq/L): 2.5 (01/11/22 0813)   Na: Dialysate NA (mEq/L): 140 (01/11/22 0813)   Bicarb: Dialysate HCO3 (mEq/L): 40 (01/11/22 0813)   Target Fluid Removal: Goal/Amount of Fluid to Remove (mL): 1000 mL (01/11/22 0813)     Access   Type & Location: [facility]   Comments:   Dressing CDI, dated 1/8/22. No s/s of infection. Both lumens aspirate & flush well. Running well at .                                   Labs   HBsAg (Antigen) / date: Neg Ag 12/25/2021                                              HBsAb (Antibody) / date: <10 Ab 12/25/2021   Source: EPIC   Obtained/Reviewed  Critical Results Called HGB   Date Value Ref Range Status   01/11/2022 9.7 (L) 12.1 - 17.0 g/dL Final     Potassium   Date Value Ref Range Status   01/11/2022 4.9 3.5 - 5.1 mmol/L Final     Calcium   Date Value Ref Range Status   01/11/2022 9.2 8.5 - 10.1 MG/DL Final     BUN   Date Value Ref Range Status   01/11/2022 40 (H) 6 - 20 MG/DL Final     Creatinine   Date Value Ref Range Status   01/11/2022 4.85 (H) 0.70 - 1.30 MG/DL Final        Meds Given   Name Dose Route   Heparin 1:1000 1.7ml & 1.6ml Dwell in CVC after HD               Adequacy / Fluid    Total Liters Process: 71.7L   Net Fluid Removed: 500ml      Comments   Time Out Done:   (Time) 0810   Admitting Diagnosis: Hypoglycemia/ Hx epilepsy/ AMS/ EUSEBIA Consent obtained/signed: Informed Consent Verified: Yes (01/11/22 0813)   Machine / RO # Machine Number: [id number] (01/11/22 0813)   Primary Nurse Rpt Pre: Jeanmarie Chavez RN   Primary Nurse Rpt Post: Inez Hanson RN   Pt Education: S/s of hypotension; S/S of hypoglcemia; reasons for adjusting UF goal   Care Plan: ongoing   Pts outpatient clinic: [facility]     Tx Summary   Comments:            SBAR received from Primary RN. Pt arrived to HD suite A&Ox4. Consent signed & on file. 8181: Each catheter limb disinfected per p&p, caps removed, hubs disinfected per p&p. Each lumen aspirated for blood return and flushed with Normal Saline per policy. Labs drawn per request/ order. VSS. Dialysis Tx initiated. 0945: Patient c/o headache. BP WNL. BG was 76 prior to arrival. Called for unit for BG check. Gave patient иван salazar. UF off for now. 1015: BG 120mg/dL. VSS. Headache resolving. UF back on at decreased rate. MD present and aware of interventions. * no further issues*    1130: Tx ended. VSS. Each dialysis catheter limb disinfected per p&p, all possible blood returned per p&p, and each dialysis hub disinfected per p&p. Each lumen flushed, post dialysis catheter Heparin dwell instilled per order, and caps applied. Bed locked and in the lowest position, call bell and belongings in reach. SBAR given to Primary, RN. Patient is stable at time of their departure. All Dialysis related medications have been reviewed.

## 2022-01-11 NOTE — PROGRESS NOTES
Transition of Care Plan:     RUR:  18%     Disposition: TBD   NEW ESRD:  Requires Dialysis 3 x week at OP facility. Follow up appointments:  As Needed  DME needed: TBD   Transportation at Discharge:  Medicaid Transport  [place] or means to access home: N/A    Medicare Letter:   N/A  Is patient a BCPI-A Bundle: If yes, was Bundle Letter given?:    Is patient a [place] and connected with the [address] [facility]? N/A  If yes, was [employer] transfer form completed and [facility] notified? Caregiver Contact:  Elina Mcfarlane  [phone]  Discharge Caregiver contacted prior to discharge? Yes    Chart reviewed. CM continuing to follow for discharge planning. Per PT report in rounds today, pt may be considered too high level functioning for IPR recommendation as of now. Pt more appropriate for a home d/c with C. CM manager contacted sister, Levi Carr, yesterday to discuss discharge planning. Per Levi Carr, pt is unable to d/c to her house as originally thought. She mentioned possibility of Medicaid correction placement. Alexandra shared contact information for pt's niece, Nadine Gray ([phone]) who may be willing to help pt at d/c. She lives in [address] and her father is pt's step-brother. CM aware of consult for OP HD set up. Awaiting to secure disposition in order to determine which HD clinic will be most appropriate for pt based on location. Working to identify if pt is going to remain in [facility] or return to [place] area. CM attempted to meet with pt at bedside today upon his return from HD. Pt politely deferred visit as he was on the phone. Will re-attempt visit in AM and continue to follow.     Current barrier to d/c: disposition unknown, needs HD chair set up in [facility]    Tony Peterson, [address], [address]
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
## 2022-01-11 NOTE — PROGRESS NOTES
Bedside and Verbal shift change report given to Kendra Lobato RN (oncoming nurse) by Rad Tilley RN (offgoing nurse). Report included the following information SBAR, Kardex, ED Summary, Intake/Output, MAR and Recent Results.

## 2022-01-11 NOTE — PROGRESS NOTES
Physical Therapy:  Patient is currently JHONY for HD. Will defer PT session and continue to follow.     Mena Prieto PT, DPT

## 2022-01-12 LAB
GLUCOSE BLD STRIP.AUTO-MCNC: 163 MG/DL (ref 65–117)
GLUCOSE BLD STRIP.AUTO-MCNC: 203 MG/DL (ref 65–117)
GLUCOSE BLD STRIP.AUTO-MCNC: 339 MG/DL (ref 65–117)
GLUCOSE BLD STRIP.AUTO-MCNC: 366 MG/DL (ref 65–117)
GLUCOSE BLD STRIP.AUTO-MCNC: 63 MG/DL (ref 65–117)
SERVICE CMNT-IMP: ABNORMAL

## 2022-01-12 PROCEDURE — 97535 SELF CARE MNGMENT TRAINING: CPT | Performed by: OCCUPATIONAL THERAPIST

## 2022-01-12 PROCEDURE — 74011636637 HC RX REV CODE- 636/637: Performed by: HOSPITALIST

## 2022-01-12 PROCEDURE — 74011250637 HC RX REV CODE- 250/637: Performed by: STUDENT IN AN ORGANIZED HEALTH CARE EDUCATION/TRAINING PROGRAM

## 2022-01-12 PROCEDURE — 97116 GAIT TRAINING THERAPY: CPT

## 2022-01-12 PROCEDURE — 74011636637 HC RX REV CODE- 636/637: Performed by: INTERNAL MEDICINE

## 2022-01-12 PROCEDURE — 97530 THERAPEUTIC ACTIVITIES: CPT

## 2022-01-12 PROCEDURE — 82962 GLUCOSE BLOOD TEST: CPT

## 2022-01-12 PROCEDURE — 74011250637 HC RX REV CODE- 250/637: Performed by: INTERNAL MEDICINE

## 2022-01-12 PROCEDURE — 65660000000 HC RM CCU STEPDOWN

## 2022-01-12 PROCEDURE — 74011250637 HC RX REV CODE- 250/637: Performed by: HOSPITALIST

## 2022-01-12 PROCEDURE — 74011250637 HC RX REV CODE- 250/637: Performed by: PSYCHIATRY & NEUROLOGY

## 2022-01-12 RX ADMIN — Medication 6 UNITS: at 16:40

## 2022-01-12 RX ADMIN — LACOSAMIDE 100 MG: 50 TABLET, FILM COATED ORAL at 02:48

## 2022-01-12 RX ADMIN — Medication 100 MG: at 08:41

## 2022-01-12 RX ADMIN — Medication 2 UNITS: at 22:13

## 2022-01-12 RX ADMIN — MELATONIN 3 MG: at 21:06

## 2022-01-12 RX ADMIN — AMLODIPINE BESYLATE 5 MG: 5 TABLET ORAL at 08:41

## 2022-01-12 RX ADMIN — MUPIROCIN: 20 OINTMENT TOPICAL at 09:00

## 2022-01-12 RX ADMIN — Medication 2 UNITS: at 08:42

## 2022-01-12 RX ADMIN — LEVETIRACETAM 500 MG: 500 TABLET, FILM COATED ORAL at 02:48

## 2022-01-12 RX ADMIN — Medication 5 UNITS: at 08:41

## 2022-01-12 RX ADMIN — INSULIN GLARGINE 15 UNITS: 100 INJECTION, SOLUTION SUBCUTANEOUS at 22:12

## 2022-01-12 RX ADMIN — ACETAMINOPHEN 650 MG: 325 TABLET ORAL at 16:51

## 2022-01-12 RX ADMIN — LACOSAMIDE 100 MG: 50 TABLET, FILM COATED ORAL at 15:50

## 2022-01-12 RX ADMIN — LEVETIRACETAM 500 MG: 500 TABLET, FILM COATED ORAL at 15:50

## 2022-01-12 NOTE — PROGRESS NOTES
Transition of Care Plan:     RUR:  18%     Disposition: TBD   NEW ESRD:  Requires Dialysis 3 x week at OP facility. Follow up appointments:  As Needed  DME needed: TBD   Transportation at [address] or means to access home: N/A   IM Medicare Letter:   N/A  Is patient a BCPI-A Bundle:                      If yes, was Bundle Letter given?:    Is patient a  and connected with the [facility]?   N/A  If yes, was [employer] transfer form completed and [facility] notified? Caregiver Contact:  Alexandra Hitchcock  [phone]  Discharge Caregiver contacted prior to discharge? Yes      Update 12:24 PM  CM contacted [employer] to check status of eligibility. CM informed that pt's Medicaid coverage was terminated as of 1/7/22. Pt has not active health insurance coverage. Will submit first source referral for screening for Medicaid. Will need to work on [employer] dialysis chair moving forward. Update 12:05 PM  Further chart review indicates previous address is [address] [place] [address], [address]      Initial note:  Chart reviewed. CM met with pt at bedside to discuss discharge planning. Discussed update recommendation of d/c home with either [facility] vs OP therapy. IPR placement no longer indicated. Inquired as to where pt will discharge home to. Pt states that he plans on going to his fibib's house of 18 years. He provided the following address: [facility] [address]. Per pt, he has not talked or seen his fiance in over a year. When asked fibib's name, pt states Maggie Mendez. He is uncertain of spelling of her last name and cannot produce a phone number for her. Pt confirms it is her house and it does not belong to him. Pt then shared that he has intentions of leaving his fiance once he can find a place of his own to live. Pt gave CM permission to contact his niece, Monica Solis. He plans on calling her this evening when she gets off of work after [phone]. ([phone]).  Pt reports that Antwon Marte lives in the [facility] area. CM will attempt contact today. Pt reports he cannot d/c to his brother or sister's home as originally thought. He has asked CM to explore dialysis centers in the [place] area. CM unable to secure HD chair until d/c address is confirmed. Unable to d/c patient to address provided without confirming with fiance that he can return as it is not his home. CM will check transportation benefits as well. Pt reports his only means of transportation is by foot or bicycle. Will continue to follow.     Josh Riley, MSW   Care Manager, [address]  [phone]

## 2022-01-12 NOTE — PROGRESS NOTES
Hospitalist Progress Note    NAME: Sujey Carballo   :  1965   MRN:  [id number]       Assessment / Plan:  Acute metabolic encephalopathy  Status epilepticus  History of seizure disorder  Aspiration pneumonia  back to baseline  MRI brainno acute abnormality. CT headno acute abnormality. Lumbar puncture doneCSF reveals no infectious etiology so far. CSF culture negative. CSF HSV negative   CSF meningitis panel negative. Out side hospital EEG indication triphasic slowing and focal 3hz right fronto temproal field siezure activety. EEG done here shows no seizure. No more seizure reported on the continuous EEG. Continue Keppra along with Vimpat. Seizure precaution. S/p course of Levaquin  --c/w thiamine  Consulted psychiatryrecommend Haldol as needed. Mental status appears to be at baseline  --Not hypoxic  --PT OT eval and treat. Recommend IPR. Patient reports that he could stay with his brother. Discussed with CM, working on setting up dialysis chair and possible discharge on Friday    End-stage renal disease on dialysis  Anemia  Needs outpatient HD  --Garcia removed  --c/w Retacrit    Migraine  Started on Fioricet as needed. Hypertension  Diabetes type 2  Hyperlipidemia  Polysubstance abuse  -Sliding scale insulin along with Lantus  -c/w amlodipine      Body mass index is 22.55 kg/m². Estimated discharge date: January 10  Barriers: Renal function stabilize    Code status: Full  Prophylaxis: Lovenox  Recommended Disposition: SNF     Subjective:     Chief Complaint / Reason for Physician Visit  Patient seen and examined today. He is having trouble ordering his food and is upset about some of the food selections. Understands he will need to conitnue with HD. He did have his session cut short yesterday due to not feeling well and tachycardia. He feels better now. He otherwise has no complaints.       Objective:     VITALS:   Last 24hrs VS reviewed since prior progress note. Most recent are:  Patient Vitals for the past 24 hrs:   Temp Pulse Resp BP SpO2   01/12/22 1130 97.7 °F (36.5 °C) 100 16 133/89 100 %   01/12/22 0817 98.3 °F (36.8 °C) 67 16 127/75 100 %   01/12/22 0404 98.5 °F (36.9 °C) 69 18 122/72 96 %   01/11/22 2100 98.1 °F (36.7 °C) 89 18 122/85 96 %   01/11/22 1517 98.1 °F (36.7 °C) 73 19 (!) 146/87 99 %       Intake/Output Summary (Last 24 hours) at 1/12/2022 1331  Last data filed at 1/12/2022 1130  Gross per 24 hour   Intake    Output 725 ml   Net -725 ml        I had a face to face encounter and independently examined this patient on 1/12/2022, as outlined below:  PHYSICAL EXAM:  General: WD, WN. Awake, not alert, cooperative, no acute distress    EENT:  EOMI. Anicteric sclerae. MMM  Resp:  CTA bilaterally, no wheezing or rales. No accessory muscle use  CV:  Regular  rhythm,  No edema, permcath left chest wall  GI:  Soft, Non distended, Non tender. +Bowel sounds  Neurologic:  Awake and alert, does not appear confused, normal speech,   Psych:   Good insight. Not anxious nor agitated  Skin:  No rashes. No jaundice, multiple tatoos    Reviewed most current lab test results and cultures  YES  Reviewed most current radiology test results   YES  Review and summation of old records today    NO  Reviewed patient's current orders and MAR    YES  PMH/SH reviewed - no change compared to H&P  ________________________________________________________________________  Care Plan discussed with:    Comments   Patient x    Family      RN x    Care Manager     Consultant                       x Multidiciplinary team rounds were held today with , nursing, pharmacist and clinical coordinator. Patient's plan of care was discussed; medications were reviewed and discharge planning was addressed.      ________________________________________________________________________  Total NON critical care TIME:  32  Minutes    Total CRITICAL CARE TIME Spent:   Minutes non procedure based      Comments   >50% of visit spent in counseling and coordination of care     ________________________________________________________________________  Ani Rhoades MD     Procedures: see electronic medical records for all procedures/Xrays and details which were not copied into this note but were reviewed prior to creation of Plan. LABS:  I reviewed today's most current labs and imaging studies.   Pertinent labs include:  Recent Labs     01/11/22 0826   WBC 9.5   HGB 9.7*   HCT 29.8*        Recent Labs     01/11/22 0826   *   K 4.9   CL 96*   CO2 30   *   BUN 40*   CREA 4.85*   CA 9.2   PHOS 4.3   ALB 2.8*       Signed: Ani Rhoades MD

## 2022-01-12 NOTE — PROGRESS NOTES
Visit was in 21  for a follow up visit. Patient was on the phone with family, but ended call and welcomed  in to visit. He had a lot to share and expressed concern about going home to his fiancee. He shared some difficult family dynamics that got in trouble multiple times and stated that he fears he might go back to his past life if he returns to same unhealthy environment. He has limited support, but has a nece who is considering assisting him resettle.  listened with empathy and affirmed thoughts and emotions. Continuous self care was encouraged. Patient seem to still have much to process but indicated he was feeling a little better after interacting and sharing his thoughts with . He thanked  for the support, he was assured of pastoral care availability for continuous support upon staff referral.   Visited by: Alejandra Yee.    Paging Service: [phone])

## 2022-01-12 NOTE — PROGRESS NOTES
Pt got 7 units of insulin this morning (5 scheduled and 2 SSI) for blood glucose of 203. Pt then requested a different breakfast tray, which has not been delivered, and refused breakfast. Pt became shaky and weak feeling. Rechecked blood glucose was 63 at about 10:50. Pt given juice and crackers, will recheck blood glucose in 15 minutes.     Monico Odom RN

## 2022-01-12 NOTE — PROGRESS NOTES
Problem: Self Care Deficits Care Plan (Adult)  Goal: *Acute Goals and Plan of Care (Insert Text)  Description: FUNCTIONAL STATUS PRIOR TO ADMISSION: Patient was independent and active without use of DME. Was incarcerated just prior to admit. Works as a . HOME SUPPORT PRIOR TO ADMISSION: The patient lived with roommates but did not require assist.    Occupational Therapy Goals:  Initiated 1/4/2022  1. Patient will perform grooming with modified independence within 7 days. 2. Patient will perform upper body dressing and lower body dressing with modified independence within 7 days. 3. Patient will perform toileting with modified independence within 7 days. 4. Patient will transfer from toilet with modified independence using the least restrictive device and appropriate durable medical equipment within 7 days. Outcome: Resolved/Met    OCCUPATIONAL THERAPY TREATMENT/WEEKLY RE-EVALUATION WITH DISCHARGE  Patient: Alannah Del Valle (80 y.o. male)  Date: 1/12/2022  Diagnosis: AMS (altered mental status) [R41.82]  Status epilepticus ([facility] [G40.901]  Hypoglycemia [E16.2]  ESRD needing dialysis ([facility] [N18.6, Z99.2]  Aspiration pneumonia ([facility] [J69.0] <principal problem not specified>       Precautions: Fall  Chart, occupational therapy assessment, plan of care, and goals were reviewed. ASSESSMENT  Patient has made excellent functional progress, having surpassed all OT related goals. He is now performing ADLs and related functional mobility at his independent baseline. Patient demonstrated good overall strength, balance and activity tolerance for the performance of all functional activity, including that which would be needed for some IADL performance. At this time he is without further acute OT needs and he has no OT needs for after discharge.               PLAN :  Discharge from acute OT    Recommendation for discharge: (in order for the patient to meet his/her long term goals)  No skilled occupational therapy/ follow up rehabilitation needs identified at this time. Equipment recommendations for successful discharge (if) home:Cane per PT         OBJECTIVE DATA SUMMARY:   Cognitive/Behavioral Status:  Neurologic State: Alert  Orientation Level: Oriented X4  Cognition: Appropriate decision making; Appropriate for age attention/concentration; Appropriate safety awareness; Follows commands        Safety/Judgement: Awareness of environment; Insight into deficits;Good awareness of safety precautions    Functional Mobility and Transfers for ADLs:  Bed Mobility:  Rolling: Independent  Supine to Sit: Independent  Sit to Supine: Independent  Scooting: Independent    Transfers:  Sit to Stand: Independent  Functional Transfers  Bathroom Mobility: Independent (ambulating without an AD)  Bed to Chair: Independent    Balance:  Sitting: Intact  Standing: Intact    ADL Intervention:  Grooming  Position Performed: Standing  Washing Face: Independent  Washing Hands: Independent  Brushing/Combing Hair: Independent    Upper Body Dressing Assistance  Shirt simulation with hospital gown: Independent (including retrieval from drawers in room)    Lower Body Dressing Assistance  Underpants: Independent (including retrieval from drawers in room)  Socks: Independent (including retrieval from drawers in room)  Leg Crossed Method Used: Yes  Position Performed: Bending forward method;Standing    Toileting  Toileting Assistance: Independent (performed in standing)  Bladder Hygiene: Independent  Clothing Management: Independent    Cognitive Retraining  Safety/Judgement: Awareness of environment; Insight into deficits;Good awareness of safety precautions    Pain:  No complaint of pain    Activity Tolerance:   Good  Please refer to the flowsheet for vital signs taken during this treatment.     After treatment patient left in no apparent distress:   Sitting in chair and Call bell within reach    COMMUNICATION/COLLABORATION:   The patients plan of care was discussed with: Registered Nurse    Neha Paredes, OTR/L  Time Calculation: 19 mins

## 2022-01-12 NOTE — PROGRESS NOTES
Problem: Mobility Impaired (Adult and Pediatric)  Goal: *Acute Goals and Plan of Care (Insert Text)  Description: FUNCTIONAL STATUS PRIOR TO ADMISSION: Patient was independent and active without use of DME.    HOME SUPPORT PRIOR TO ADMISSION: Just prior to admission, patient was incarcerated in care home. Prior to care home, the patient lived with 3 roommates but did not require assist.    Physical Therapy Goals  Initiated 1/4/2022  1. Patient will move from supine to sit and sit to supine , scoot up and down, and roll side to side in bed with modified independence within 7 day(s). - MET   2. Patient will transfer from bed to chair and chair to bed with modified independence using the least restrictive device within 7 day(s). - MET  3. Patient will perform sit to stand with modified independence within 7 day(s). - MET  4. Patient will ambulate with modified independence for 300 feet with the least restrictive device within 7 day(s). - MET  5. Patient will ascend/descend 4 stairs with single handrail(s) with supervision/set-up within 7 day(s). - DISCONTINUE  Outcome: Resolved/Met   PHYSICAL THERAPY TREATMENT/DISCHARGE  Patient: Ventura Irizarry (54 y.o. male)  Date: 1/12/2022  Diagnosis: AMS (altered mental status) [R41.82]  Status epilepticus ([facility] [G40.901]  Hypoglycemia [E16.2]  ESRD needing dialysis ([facility] [N18.6, Z99.2]  [facility] [J69.0] <principal problem not specified>       Precautions: Fall  Chart, physical therapy assessment, plan of care and goals were reviewed. ASSESSMENT  Patient continues with skilled PT services and has progressed towards goals, meeting all goals. Patient completes bed mobility Indep, transfers Mod Indep, and ambulating [id number] with SPC Mod Indep. No unsteadiness or LOB noted during functional mobility. At times, mobility is limited due to R knee pain; this is a chronic condition and ambulation remains safe though antalgic.  Patient is safe to mobilize with nursing staff. Skilled PT services in acute setting are no longer indicated. [facility] if this is available as patient continues to demonstrate decreased overall activity tolerance. PLAN :  Patient will be discharged from acute skilled physical therapy at this time. Rationale for discharge:  Goals achieved    Recommendation for discharge: (in order for the patient to meet his/her long term goals)  Physical therapy at least 2 days/week in the home     This discharge recommendation:  Has been made in collaboration with the attending provider and/or case management    IF patient discharges home will need the following DME: straight cane     SUBJECTIVE:   Patient stated I feel so much better after walking.     OBJECTIVE DATA SUMMARY:   Critical Behavior:  Neurologic State: Alert  Orientation Level: Oriented X4  Cognition: Follows commands,Appropriate decision making,Appropriate for age attention/concentration,Appropriate safety awareness  Safety/Judgement: Fall prevention  Functional Mobility Training:  Bed Mobility:  Rolling: Independent  Supine to Sit: Independent  Sit to Supine: Independent  Scooting: Independent  Transfers:  Sit to Stand: Modified independent  Stand to Sit: Modified independent  Bed to Chair: Modified independent  Balance:  Sitting: Intact  Standing: Intact  Ambulation/Gait Training:  Distance (ft): 1000 Feet (ft)  Assistive Device: Cane, straight  Ambulation - Level of Assistance: Modified independent  Gait Abnormalities: Antalgic  Base of Support: Widened;Shift to left  Speed/Kristel: Pace decreased (<100 feet/min)    Activity Tolerance:   Good, SpO2 stable on RA and requires rest breaks    After treatment patient left in no apparent distress:   Supine in bed, Call bell within reach and Side rails x 3    COMMUNICATION/COLLABORATION:   The patients plan of care was discussed with: Registered nurse and Case management.      Mena Medina PT   Time Calculation: 25 mins

## 2022-01-12 NOTE — PROGRESS NOTES
I prayed with Rachel Solomon and celebrated with him the anointing of the [address]   Father Jesse Llanes

## 2022-01-12 NOTE — PROGRESS NOTES
End of Shift Note    Bedside shift change report given to Lorena Arreola RN (oncoming nurse) by Ivonne Paige RN (offgoing nurse). Report included the following information SBAR    Shift worked:  DAYS   Shift summary and any significant changes:    -DISCHARGE PENDING PLACEMENT  -WOUND CARE TO SACRUM PERFORMED  -PT AGAIN HAD DIFFICULTIES WITH HIS MEALS, LEADING TO LOW BLOOD GLUCOSE (63) THIS MORNING WHILE WAITING FOR HIS BREAKFAST TRAY TO ARRIVE. -MD ADJUSTED SSI TO REFLECT PT'S UNPREDICTABLE MEALS     Concerns for physician to address:  NONE   [facility] phone for oncoming shift:   [phone]     Patient Information  Nakia [address]  49 y.o.  12/28/2021 11:05 PM by Liliam Wick MD. Nellie Phipps was admitted from halfway     Problem List  Patient Active Problem List    Diagnosis Date Noted    Status epilepticus ([address] 12/28/2021    Aspiration pneumonia ([address] 12/28/2021    ESRD needing dialysis ([address] 12/28/2021    AMS (altered mental status) 12/28/2021    ATN (acute tubular necrosis) ([address] 12/25/2021    Interstitial nephritis determined by biopsy 12/25/2021    Thrombocytopenia ([address] 12/25/2021    Hypertension 12/25/2021    Sepsis ([address] 12/25/2021    Acute metabolic encephalopathy 37/71/8023    Hypoglycemia 11/25/2021    EUSEBIA (acute kidney injury) ([address] 15/77/2497    Metabolic acidemia 95/91/2298    Intractable nausea and vomiting 11/25/2021    Hyperglycemia due to type 2 diabetes mellitus ([address] 05/05/2019    Hyperosmolar non-ketotic state in patient with type 2 diabetes mellitus ([address] 12/16/2018     Past Medical History:   Diagnosis Date    Asthma     Diabetes ([address]     High cholesterol     Seizure ([address]        Core Measures:  CVA: No No  CHF:No No  PNA:No No    Activity:  Activity Level:  Up with Assistance  Number times ambulated in hallways past shift: 0  Number of times OOB to chair past shift: 0    Cardiac:   Cardiac Monitoring: Yes      Cardiac Rhythm: Sinus Rhythm    Access:   Current line(s): PIV and HD access     Genitourinary:   Urinary status: NO  Urinary Catheter? No     Respiratory:   O2 Device: None (Room air)  Chronic home O2 use?: N/A  Incentive spirometer at bedside: N/A       GI:  Last Bowel Movement Date: 01/01/22  Current diet:  ADULT DIET Easy to Chew; 4 carb choices (60 gm/meal)  DIET ONE TIME MESSAGE  DIET ONE TIME MESSAGE  DIET ONE TIME MESSAGE  Passing flatus: YES  Tolerating current diet: NO       Pain Management:   Patient states pain is manageable on current regimen: N/A    Skin:  Loco Score: 20  Interventions: increase time out of bed, limit briefs and internal/external urinary devices    Patient Safety:  Fall Score:  Total Score: 3  Interventions: bed/chair alarm, gripper socks and pt to call before getting OOB  High Fall Risk: Yes    DVT prophylaxis:  DVT prophylaxis Med- No  DVT prophylaxis SCD or ESVIN- No     Active Consults:  IP CONSULT TO NEUROLOGY  IP CONSULT TO NEPHROLOGY  IP CONSULT TO PSYCHIATRY    Length of Stay:  Expected LOS: 5d 9h  Actual LOS: 15  Discharge Plan: PENDING PLACEMENT    John García RN

## 2022-01-12 NOTE — PROGRESS NOTES
End of Shift Note    Bedside shift change report given to Sheila Carroll (oncoming nurse) by Lico Anderson RN (offgoing nurse). Report included the following information SBAR    Shift worked: 7p-7a    Shift summary and any significant changes:    Pt pleasant and asleep all night. No complaints     Concerns for physician to address:  NONE   Zone phone for oncoming shift:   [phone]     Patient Information  Sydney Members [address]  49 y.o.  12/28/2021 11:05 PM by James Means MD. Edyta Trevino was admitted from halfway     Problem List  Patient Active Problem List    Diagnosis Date Noted    Status epilepticus ([address] 12/28/2021    Aspiration pneumonia ([address] 12/28/2021    ESRD needing dialysis ([address] 12/28/2021    AMS (altered mental status) 12/28/2021    ATN (acute tubular necrosis) ([address] 12/25/2021    Interstitial nephritis determined by biopsy 12/25/2021    Thrombocytopenia ([address] 12/25/2021    Hypertension 12/25/2021    Sepsis ([address] 12/25/2021    Acute metabolic encephalopathy 63/13/9094    Hypoglycemia 11/25/2021    EUSEBIA (acute kidney injury) ([address] 30/65/1045    Metabolic acidemia 18/34/5220    Intractable nausea and vomiting 11/25/2021    Hyperglycemia due to type 2 diabetes mellitus ([address] 05/05/2019    Hyperosmolar non-ketotic state in patient with type 2 diabetes mellitus ([address] 12/16/2018     Past Medical History:   Diagnosis Date    Asthma     Diabetes ([address]     High cholesterol     Seizure ([address]        Core Measures:  CVA: No No  CHF:No No  PNA:No No    Activity:  Activity Level: Up with Assistance  Number times ambulated in hallways past shift: 0  Number of times OOB to chair past shift: 0    Cardiac:   Cardiac Monitoring: Yes      Cardiac Rhythm: Sinus Rhythm    Access:   Current line(s): PIV and HD access     Genitourinary:   Urinary status: NO  Urinary Catheter?  Yes Placement Date 12/28/2021 Reason Medically Necessary retention    Respiratory:   O2 Device: None (Room air)  Chronic home O2 use?: N/A  Incentive spirometer at bedside: N/A       GI:  Last Bowel Movement Date: 01/01/22  Current diet:  ADULT DIET Easy to Chew; 4 carb choices (60 gm/meal)  DIET ONE TIME MESSAGE  Passing flatus: YES  Tolerating current diet: NO       Pain Management:   Patient states pain is manageable on current regimen: N/A    Skin:  Loco Score: 20  Interventions: increase time out of bed, limit briefs and internal/external urinary devices    Patient Safety:  Fall Score:  Total Score: 3  Interventions: bed/chair alarm, gripper socks and pt to call before getting OOB  High Fall Risk: Yes    DVT prophylaxis:  DVT prophylaxis Med- No  DVT prophylaxis SCD or ESVIN- No     Active Consults:  IP CONSULT TO NEUROLOGY  IP CONSULT TO NEPHROLOGY  IP CONSULT TO PSYCHIATRY    Length of Stay:  Expected LOS: 5d 9h  Actual LOS: 15  Discharge Plan: 1/11-1/12    Corrin Krabbe, RN

## 2022-01-13 LAB
ALBUMIN SERPL-MCNC: 2.7 G/DL (ref 3.5–5)
ANION GAP SERPL CALC-SCNC: 7 MMOL/L (ref 5–15)
BUN SERPL-MCNC: 33 MG/DL (ref 6–20)
BUN/CREAT SERPL: 9 (ref 12–20)
CALCIUM SERPL-MCNC: 8.8 MG/DL (ref 8.5–10.1)
CHLORIDE SERPL-SCNC: 98 MMOL/L (ref 97–108)
CO2 SERPL-SCNC: 29 MMOL/L (ref 21–32)
CREAT SERPL-MCNC: 3.74 MG/DL (ref 0.7–1.3)
ERYTHROCYTE [DISTWIDTH] IN BLOOD BY AUTOMATED COUNT: 14.9 % (ref 11.5–14.5)
GLUCOSE BLD STRIP.AUTO-MCNC: 118 MG/DL (ref 65–117)
GLUCOSE BLD STRIP.AUTO-MCNC: 223 MG/DL (ref 65–117)
GLUCOSE BLD STRIP.AUTO-MCNC: 349 MG/DL (ref 65–117)
GLUCOSE BLD STRIP.AUTO-MCNC: 370 MG/DL (ref 65–117)
GLUCOSE SERPL-MCNC: 187 MG/DL (ref 65–100)
HCT VFR BLD AUTO: 28 % (ref 36.6–50.3)
HEPARIN AGGREGATION,XHEAGT: NEGATIVE
HGB BLD-MCNC: 8.9 G/DL (ref 12.1–17)
MCH RBC QN AUTO: 30.6 PG (ref 26–34)
MCHC RBC AUTO-ENTMCNC: 31.8 G/DL (ref 30–36.5)
MCV RBC AUTO: 96.2 FL (ref 80–99)
NRBC # BLD: 0 K/UL (ref 0–0.01)
NRBC BLD-RTO: 0 PER 100 WBC
PHOSPHATE SERPL-MCNC: 5 MG/DL (ref 2.6–4.7)
PLATELET # BLD AUTO: 311 K/UL (ref 150–400)
PLATELET FACTOR 4,XPF4T: POSITIVE
PMV BLD AUTO: 10.1 FL (ref 8.9–12.9)
POTASSIUM SERPL-SCNC: 4.9 MMOL/L (ref 3.5–5.1)
RBC # BLD AUTO: 2.91 M/UL (ref 4.1–5.7)
SERVICE CMNT-IMP: ABNORMAL
SODIUM SERPL-SCNC: 134 MMOL/L (ref 136–145)
WBC # BLD AUTO: 6.1 K/UL (ref 4.1–11.1)

## 2022-01-13 PROCEDURE — 74011250636 HC RX REV CODE- 250/636: Performed by: INTERNAL MEDICINE

## 2022-01-13 PROCEDURE — 80069 RENAL FUNCTION PANEL: CPT

## 2022-01-13 PROCEDURE — 74011636637 HC RX REV CODE- 636/637: Performed by: INTERNAL MEDICINE

## 2022-01-13 PROCEDURE — 74011250637 HC RX REV CODE- 250/637: Performed by: STUDENT IN AN ORGANIZED HEALTH CARE EDUCATION/TRAINING PROGRAM

## 2022-01-13 PROCEDURE — 85027 COMPLETE CBC AUTOMATED: CPT

## 2022-01-13 PROCEDURE — 74011636637 HC RX REV CODE- 636/637: Performed by: NURSE PRACTITIONER

## 2022-01-13 PROCEDURE — 74011250637 HC RX REV CODE- 250/637: Performed by: HOSPITALIST

## 2022-01-13 PROCEDURE — 90935 HEMODIALYSIS ONE EVALUATION: CPT

## 2022-01-13 PROCEDURE — 74011250637 HC RX REV CODE- 250/637: Performed by: PSYCHIATRY & NEUROLOGY

## 2022-01-13 PROCEDURE — 65660000000 HC RM CCU STEPDOWN

## 2022-01-13 PROCEDURE — 36415 COLL VENOUS BLD VENIPUNCTURE: CPT

## 2022-01-13 PROCEDURE — 82962 GLUCOSE BLOOD TEST: CPT

## 2022-01-13 RX ORDER — INSULIN LISPRO 100 [IU]/ML
3 INJECTION, SOLUTION INTRAVENOUS; SUBCUTANEOUS ONCE
Status: COMPLETED | OUTPATIENT
Start: 2022-01-13 | End: 2022-01-13

## 2022-01-13 RX ADMIN — BUTALBITAL, ACETAMINOPHEN, AND CAFFEINE 1 TABLET: 50; 325; 40 TABLET ORAL at 10:39

## 2022-01-13 RX ADMIN — LEVETIRACETAM 500 MG: 500 TABLET, FILM COATED ORAL at 14:23

## 2022-01-13 RX ADMIN — LACOSAMIDE 100 MG: 50 TABLET, FILM COATED ORAL at 01:51

## 2022-01-13 RX ADMIN — BUTALBITAL, ACETAMINOPHEN, AND CAFFEINE 1 TABLET: 50; 325; 40 TABLET ORAL at 20:51

## 2022-01-13 RX ADMIN — Medication 3 UNITS: at 23:06

## 2022-01-13 RX ADMIN — MUPIROCIN: 20 OINTMENT TOPICAL at 14:24

## 2022-01-13 RX ADMIN — AMLODIPINE BESYLATE 5 MG: 5 TABLET ORAL at 10:39

## 2022-01-13 RX ADMIN — EPOETIN ALFA-EPBX 4000 UNITS: 4000 INJECTION, SOLUTION INTRAVENOUS; SUBCUTANEOUS at 23:06

## 2022-01-13 RX ADMIN — LEVETIRACETAM 500 MG: 500 TABLET, FILM COATED ORAL at 01:51

## 2022-01-13 RX ADMIN — HEPARIN SODIUM 1700 UNITS: 1000 INJECTION INTRAVENOUS; SUBCUTANEOUS at 10:05

## 2022-01-13 RX ADMIN — ONDANSETRON 4 MG: 4 TABLET, ORALLY DISINTEGRATING ORAL at 10:39

## 2022-01-13 RX ADMIN — HEPARIN SODIUM 1600 UNITS: 1000 INJECTION INTRAVENOUS; SUBCUTANEOUS at 10:05

## 2022-01-13 RX ADMIN — MELATONIN 3 MG: at 22:19

## 2022-01-13 RX ADMIN — Medication 4 UNITS: at 17:54

## 2022-01-13 RX ADMIN — INSULIN GLARGINE 15 UNITS: 100 INJECTION, SOLUTION SUBCUTANEOUS at 23:06

## 2022-01-13 RX ADMIN — LACOSAMIDE 100 MG: 50 TABLET, FILM COATED ORAL at 14:23

## 2022-01-13 RX ADMIN — Medication 100 MG: at 10:39

## 2022-01-13 NOTE — PROGRESS NOTES
End of Shift Note    Bedside shift change report given to Sasha Lewis (oncoming nurse) by Elai Andres RN (offgoing nurse). Report included the following information SBAR    Shift worked: 7p-7a    Shift summary and any significant changes:    Pt pleasant and asleep all night. No complaints. Given coverage for insulin over the night. Concerns for physician to address:  NONE   Zone phone for oncoming shift:   None      Patient Information  Garrett Mcardle [address]  49 y.o.  12/28/2021 11:05 PM by Naveed Macedo MD. Arely Quintero was admitted from retirement     Problem List  Patient Active Problem List    Diagnosis Date Noted    Status epilepticus ([address] 12/28/2021    Aspiration pneumonia ([address] 12/28/2021    ESRD needing dialysis ([address] 12/28/2021    AMS (altered mental status) 12/28/2021    ATN (acute tubular necrosis) ([address] 12/25/2021    Interstitial nephritis determined by biopsy 12/25/2021    Thrombocytopenia ([address] 12/25/2021    Hypertension 12/25/2021    Sepsis ([address] 12/25/2021    Acute metabolic encephalopathy 25/04/0276    Hypoglycemia 11/25/2021    EUSEBIA (acute kidney injury) ([address] 74/24/9555    Metabolic acidemia 06/77/2020    Intractable nausea and vomiting 11/25/2021    Hyperglycemia due to type 2 diabetes mellitus ([address] 05/05/2019    Hyperosmolar non-ketotic state in patient with type 2 diabetes mellitus ([address] 12/16/2018     Past Medical History:   Diagnosis Date    Asthma     Diabetes ([address]     High cholesterol     Seizure ([address]        Core Measures:  CVA: No No  CHF:No No  PNA:No No    Activity:  Activity Level: Up with Assistance  Number times ambulated in hallways past shift: 0  Number of times OOB to chair past shift: 0    Cardiac:   Cardiac Monitoring: Yes      Cardiac Rhythm: Sinus Rhythm    Access:   Current line(s): PIV and HD access     Genitourinary:   Urinary status: NO  Urinary Catheter?  Yes Placement Date 12/28/2021 Reason Medically Necessary retention    Respiratory:   O2 Device: None (Room air)  Chronic home O2 use?: N/A  Incentive spirometer at bedside: N/A       GI:  Last Bowel Movement Date: 01/01/22  Current diet:  ADULT DIET Easy to Chew; 4 carb choices (60 gm/meal)  DIET ONE TIME MESSAGE  DIET ONE TIME MESSAGE  DIET ONE TIME MESSAGE  Passing flatus: YES  Tolerating current diet: NO       Pain Management:   Patient states pain is manageable on current regimen: N/A    Skin:  Loco Score: 20  Interventions: increase time out of bed, limit briefs and internal/external urinary devices    Patient Safety:  Fall Score:  Total Score: 3  Interventions: bed/chair alarm, gripper socks and pt to call before getting OOB  High Fall Risk: Yes    DVT prophylaxis:  DVT prophylaxis Med- No  DVT prophylaxis SCD or ESVIN- No     Active Consults:  IP CONSULT TO NEUROLOGY  IP CONSULT TO NEPHROLOGY  IP CONSULT TO PSYCHIATRY    Length of Stay:  Expected LOS: 5d 9h  Actual LOS: 16  Discharge Plan: 1/11-1/12    Frantz Baker RN

## 2022-01-13 NOTE — PROGRESS NOTES
Nephrology Progress Note  Dorian Antonio     [email or web address]  Phone - [phone]   Patient: Alin Weaver    YOB: 1965        Date- 1/13/2022   Admit Date: 12/28/2021  CC: Follow up for  Acute kidney injury  IMPRESSION & PLAN:    EUSEBIA  ON HD- AIN - on dialysis  with [facility]   Interstitial nephritis determined by biopsy NSAID induced   Hypertension   Anemia   ACUTE Metabolic encephalopathy   Hx of seizures    PLAN-   SEEN ON HD TODAY-- MINIMAL FLUID REMOVAL ON HD   He will need out pt hd set up   Continue hd tts schedule   Watch for renal recovery   WE WILL CHECK DAILY BMP         Subjective: Interval History:   SEEN ON HD  CR 3.7 -- eusebia improving ? C/o  nausea  No sob      Objective:   Vitals:    01/13/22 0815 01/13/22 0830 01/13/22 0845 01/13/22 0900   BP: (!) 129/90 (!) 147/89 138/80 123/84   Pulse: 81 85 94 86   Resp: 16 16 16 16   Temp:       TempSrc:       SpO2:       Weight:       Height:          01/12 0701 - 01/13 0700  In: -   Out: 6047 [Urine:1425]  Last 3 Recorded Weights in this Encounter    01/06/22 2249 01/08/22 0645 01/10/22 0621   Weight: 53.3 kg (117 lb 9.6 oz) 53.6 kg (118 lb 1.6 oz) 55.9 kg (123 lb 4.8 oz)      Physical exam:   GEN: NAD  NECK:  Supple, no thyromegaly  RESP: Clear  b/l, no  wheezing,   CVS: RRR,S1,S2   NEURO: NON FOCAL,  normal speech  EXT:no Edema +nt     Right permcath +      Chart reviewed. Pertinent Notes reviewed. Data Review :  Recent Labs     01/13/22  0658 01/11/22  0826   * 133*   K 4.9 4.9   CL 98 96*   CO2 29 30   BUN 33* 40*   CREA 3.74* 4.85*   * 118*   CA 8.8 9.2   PHOS 5.0* 4.3     Recent Labs     01/13/22  0658 01/11/22  0826   WBC 6.1 9.5   HGB 8.9* 9.7*   HCT 28.0* 29.8*    344     No results for input(s): FE, TIBC, PSAT, FERR in the last 72 hours.    Medication list  reviewed  Current Facility-Administered Medications   Medication Dose Route Frequency  traMADoL (ULTRAM) tablet 50 mg  50 mg Oral Q6H PRN    mupirocin (BACTROBAN) 2 % ointment   Topical DAILY    [Held by provider] insulin lispro (HUMALOG) injection 5 Units  5 Units SubCUTAneous TIDAC    insulin glargine (LANTUS) injection 15 Units  15 Units SubCUTAneous QHS    epoetin jeff-epbx (RETACRIT) injection 4,000 Units  4,000 Units SubCUTAneous Q TUE, THU & SAT    melatonin tablet 3 mg  3 mg Oral QHS    thiamine mononitrate (B-1) tablet 100 mg  100 mg Oral DAILY    butalbital-acetaminophen-caffeine (FIORICET, ESGIC) -40 mg per tablet 1 Tablet  1 Tablet Oral Q6H PRN    amLODIPine (NORVASC) tablet 5 mg  5 mg Oral DAILY    heparin (porcine) 1,000 unit/mL injection 1,700 Units  1,700 Units InterCATHeter DIALYSIS PRN    And    heparin (porcine) 1,000 unit/mL injection 1,600 Units  1,600 Units InterCATHeter DIALYSIS PRN    levETIRAcetam (KEPPRA) tablet 500 mg  500 mg Oral BID    lacosamide (VIMPAT) tablet 100 mg  100 mg Oral Q12H    hydrALAZINE (APRESOLINE) 20 mg/mL injection 10 mg  10 mg IntraVENous Q6H PRN    ondansetron (ZOFRAN ODT) tablet 4 mg  4 mg Oral Q8H PRN    Or    ondansetron (ZOFRAN) injection 4 mg  4 mg IntraVENous Q6H PRN    acetaminophen (TYLENOL) tablet 650 mg  650 mg Oral Q4H PRN    Or    acetaminophen (TYLENOL) solution 650 mg  650 mg Per NG tube Q4H PRN    Or    acetaminophen (TYLENOL) suppository 650 mg  650 mg Rectal Q4H PRN    insulin lispro (HUMALOG) injection   SubCUTAneous AC&HS    glucose chewable tablet 16 g  4 Tablet Oral PRN    dextrose (D50W) injection syrg 12.5-25 g  12.5-25 g IntraVENous PRN    glucagon (GLUCAGEN) injection 1 mg  1 mg IntraMUSCular PRN          Farhan Jasso MD              [facility]  [place] [facility]  [facility]  [place], [address]  Phone - [phone]               Fax - [phone]

## 2022-01-13 NOTE — PROCEDURES
Hemodialysis / [phone]    Vitals Pre Post Assessment Pre Post   BP BP: (!) 145/84 (01/13/22 0655) 150/88 LOC A&x4 A&Ox4   HR Pulse (Heart Rate): 60 (01/13/22 0655) 76 Lungs clear clear   Resp Resp Rate: 16 (01/13/22 0655) 16 Cardiac Reg S1 S2 Reg S1 S2   Temp Temp: 97.6 °F (36.4 °C) (01/13/22 0655) 97.5, oral Skin CDI CDI   Weight  n/a n/a Edema none none   Tele status none none Pain 0 0     Orders   Duration: Start: 7687 End: 1000 Total: 3hr   Dialyzer: Dialyzer/Set Up Inspection: Daina Collier (01/13/22 0655)   K Bath: Dialysate K (mEq/L): 2 (01/13/22 0655)   Ca Bath: Dialysate CA (mEq/L): 2.5 (01/13/22 0655)   Na: Dialysate NA (mEq/L): 138 (01/13/22 0655)   Bicarb: Dialysate HCO3 (mEq/L): 35 (01/13/22 0655)   Target Fluid Removal: Goal/Amount of Fluid to Remove (mL): 1000 mL (01/13/22 0655)     Access   Type & Location: [facility]   Comments:   Dressing CDI, dated 1/8/22. No s/s of infection. Both lumens aspirate & flush well. Running well at .                                     Labs   HBsAg (Antigen) / date: Neg Ag 12/25/2021                                              HBsAb (Antibody) / date: <10 Ab 12/25/2021   Source: EPIC   Obtained/Reviewed  Critical Results Called HGB   Date Value Ref Range Status   01/13/2022 8.9 (L) 12.1 - 17.0 g/dL Final     Potassium   Date Value Ref Range Status   01/13/2022 4.9 3.5 - 5.1 mmol/L Final     Calcium   Date Value Ref Range Status   01/13/2022 8.8 8.5 - 10.1 MG/DL Final     BUN   Date Value Ref Range Status   01/13/2022 33 (H) 6 - 20 MG/DL Final     Creatinine   Date Value Ref Range Status   01/13/2022 3.74 (H) 0.70 - 1.30 MG/DL Final     Comment:     INVESTIGATED PER DELTA CHECK PROTOCOL        Meds Given   Name Dose Route   Heparin 1:1000 1.6ml & 1.7ml Dwell in CVC after HD               Adequacy / Fluid    Total Liters Process: 65.1L   Net Fluid Removed: 300ml      Comments   Time Out Done:   (Time) 1207   Admitting Diagnosis: AMS/ Hypoglycemia/ EUSEBIA   Consent obtained/signed: Informed Consent Verified: Yes (01/13/22 3462)   Machine / RO # Machine Number: [id number] (01/13/22 3865)   Primary Nurse Rpt Pre: Navdeep Anaya RN   Primary Nurse Rpt Post: Mallorie Kearns RN   Pt Education: Please do not roll on HD lines while on machine   Care Plan: ongoing   Pts outpatient clinic: [facility]     Tx Summary   Comments:       SBAR received from Primary RN. Pt arrived to HD suite A&Ox4. Consent signed & on file. 3824: Each catheter limb disinfected per p&p, caps removed, hubs disinfected per p&p. Each lumen aspirated for blood return and flushed with Normal Saline per policy. Labs drawn per request/ order. VSS. Dialysis Tx initiated. * HA, fluid bolus given. MD gave ok to end treatment at 3hr and give larger bolus. Expecting renal recovery. 1000: Tx ended. VSS. Each dialysis catheter limb disinfected per p&p, all possible blood returned per p&p, and each dialysis hub disinfected per p&p. Each lumen flushed, post dialysis catheter Heparin dwell instilled per order, and caps applied. Bed locked and in the lowest position, call bell and belongings in reach. SBAR given to Primary, RN. Patient is stable at time of their departure. All Dialysis related medications have been reviewed.

## 2022-01-13 NOTE — PROGRESS NOTES
Transition of Care Plan:     RUR:  18%     Disposition: TBD   NEW ESRD:  Requires Dialysis 3 x week at OP facility. Follow up appointments:  As Needed  DME needed: TBD   Transportation at [address] [place] [address] or means to access home: N/A   IM Medicare Letter:   N/A  Is patient a BCPI-A Bundle:                      If yes, was Bundle Letter given?:    Is patient a  and connected with the [facility]?   N/A  If yes, was [facility] transfer form completed and [facility] notified? Caregiver Contact:  Alexandra Alcaraz Mar  [phone]  Discharge Caregiver contacted prior to discharge? Yes    12:19 p.m. CM received a call from [facility] of [facility] [phone] who stated pt's insurance is active. CM reviewed previous CM's note. CM attempted to speak with pt; however, pt is currently at dialysis. CM called pt's niece, Thang Kearney at [phone], to discuss d/c. Ms. Ayana Queen explained that she does not have a contact # for pt's fiance and that the last she heard the finance was \"not in good shape. \"  Ms. Ayana Queen stated pt has many brothers and sisters and she is working with them to determine where pt will stay at d/c. Ms. Ayana Queen said she will be the \"go between\" as far as communicating with family. Ms. Ayana Queen is going to discuss with them and call CM back.       Divya Arcos MSW  Care Management, [address]

## 2022-01-13 NOTE — PROGRESS NOTES
Hospitalist Progress Note    NAME: Shady Lopez   :  1965   MRN:  [id number]       Assessment / Plan:  Acute metabolic encephalopathy  Status epilepticus  History of seizure disorder  Aspiration pneumonia  back to baseline  MRI brainno acute abnormality. CT headno acute abnormality. Lumbar puncture doneCSF reveals no infectious etiology so far. CSF culture negative. CSF HSV negative   CSF meningitis panel negative. Out side hospital EEG indication triphasic slowing and focal 3hz right fronto temproal field siezure activety. EEG done here shows no seizure. No more seizure reported on the continuous EEG. Continue Keppra along with Vimpat. Seizure precaution. S/p course of Levaquin  --c/w thiamine  Consulted psychiatryrecommend Haldol as needed. Mental status appears to be at baseline  --Not hypoxic  --PT OT eval and treat. DC planning. CM working on safe discharge plan. Confirming if one of his relatives will be able to help    End-stage renal disease on dialysis  Anemia  Needs outpatient HD. CM working on confirming schedule   --Garcia removed  --c/w Retacrit    Migraine  Started on Fioricet as needed. Hypertension  Diabetes type 2  Hyperlipidemia  Polysubstance abuse  -Sliding scale insulin along with Lantus  -c/w amlodipine      Body mass index is 22.55 kg/m². Estimated discharge date: January 10  Barriers: Renal function stabilize    Code status: Full  Prophylaxis: Lovenox  Recommended Disposition: SNF     Subjective:     Chief Complaint / Reason for Physician Visit  Patient seen and examined today. He is having trouble ordering his food and is upset about some of the food selections. Understands he will need to conitnue with HD. He did have his session cut short yesterday due to not feeling well and tachycardia. He feels better now. He otherwise has no complaints. Objective:     VITALS:   Last 24hrs VS reviewed since prior progress note.  Most recent are:  Patient Vitals for the past 24 hrs:   Temp Pulse Resp BP SpO2   01/13/22 1037 97.6 °F (36.4 °C) 76 17 (!) 145/90 98 %   01/13/22 1000 97.5 °F (36.4 °C) 76 16 (!) 150/88    01/13/22 0945  82 16 (!) 148/98    01/13/22 0930  88 16 (!) 140/85    01/13/22 0915  88 16 (!) 140/86    01/13/22 0900  86 16 123/84    01/13/22 0845  94 16 138/80    01/13/22 0830  85 16 (!) 147/89    01/13/22 0815  81 16 (!) 129/90    01/13/22 0800  78 16 137/85    01/13/22 0745  76 16 126/85    01/13/22 0730  74 16 (!) 164/93    01/13/22 0715  68 16 (!) 145/95    01/13/22 0700  63 16 (!) 149/88    01/13/22 0655 97.6 °F (36.4 °C) 60 16 (!) 145/84    01/13/22 0334 97.4 °F (36.3 °C) 97 18 131/76 97 %   01/12/22 2302 97.4 °F (36.3 °C) 91 17 115/62 91 %   01/12/22 2016 98.3 °F (36.8 °C) 79 17 132/79 96 %   01/12/22 1520 98.4 °F (36.9 °C) 80 16 (!) 145/93 99 %       Intake/Output Summary (Last 24 hours) at 1/13/2022 1359  Last data filed at 1/13/2022 1000  Gross per 24 hour   Intake    Output 1000 ml   Net -1000 ml        I had a face to face encounter and independently examined this patient on 1/13/2022, as outlined below:  PHYSICAL EXAM:  General: WD, WN. Awake, not alert, cooperative, no acute distress    EENT:  EOMI. Anicteric sclerae. MMM  Resp:  CTA bilaterally, no wheezing or rales. No accessory muscle use  CV:  Regular  rhythm,  No edema, permcath left chest wall  GI:  Soft, Non distended, Non tender. +Bowel sounds  Neurologic:  Awake and alert, does not appear confused, normal speech,   Psych:   Good insight. Not anxious nor agitated  Skin:  No rashes.   No jaundice, multiple tatoos    Reviewed most current lab test results and cultures  YES  Reviewed most current radiology test results   YES  Review and summation of old records today    NO  Reviewed patient's current orders and MAR    YES  PMH/SH reviewed - no change compared to H&P  ________________________________________________________________________  Care Plan discussed with:    Comments   Patient x    Family      RN x    Care Manager     Consultant                       x Multidiciplinary team rounds were held today with , nursing, pharmacist and clinical coordinator. Patient's plan of care was discussed; medications were reviewed and discharge planning was addressed. ________________________________________________________________________  Total NON critical care TIME:  32  Minutes    Total CRITICAL CARE TIME Spent:   Minutes non procedure based      Comments   >50% of visit spent in counseling and coordination of care     ________________________________________________________________________  Ligia Wallis MD     Procedures: see electronic medical records for all procedures/Xrays and details which were not copied into this note but were reviewed prior to creation of Plan. LABS:  I reviewed today's most current labs and imaging studies.   Pertinent labs include:  Recent Labs     01/13/22  0658 01/11/22  0826   WBC 6.1 9.5   HGB 8.9* 9.7*   HCT 28.0* 29.8*    344     Recent Labs     01/13/22  0658 01/11/22  0826   * 133*   K 4.9 4.9   CL 98 96*   CO2 29 30   * 118*   BUN 33* 40*   CREA 3.74* 4.85*   CA 8.8 9.2   PHOS 5.0* 4.3   ALB 2.7* 2.8*       Signed: Ligia Wallis MD

## 2022-01-13 NOTE — PROGRESS NOTES
TRANSFER - IN REPORT:    Verbal report received from GERARDO Hanks on Viv Delaney  being received from NT for ordered procedure      Report consisted of patients Situation, Background, Assessment and   Recommendations(SBAR). Information from the following report(s) SBAR and Kardex was reviewed with the receiving nurse. Opportunity for questions and clarification was provided. Assessment completed upon patients arrival to unit and care assumed.

## 2022-01-14 LAB
GLUCOSE BLD STRIP.AUTO-MCNC: 145 MG/DL (ref 65–117)
GLUCOSE BLD STRIP.AUTO-MCNC: 171 MG/DL (ref 65–117)
GLUCOSE BLD STRIP.AUTO-MCNC: 332 MG/DL (ref 65–117)
GLUCOSE BLD STRIP.AUTO-MCNC: 350 MG/DL (ref 65–117)
GLUCOSE BLD STRIP.AUTO-MCNC: 55 MG/DL (ref 65–117)
SERVICE CMNT-IMP: ABNORMAL

## 2022-01-14 PROCEDURE — 65660000000 HC RM CCU STEPDOWN

## 2022-01-14 PROCEDURE — 82962 GLUCOSE BLOOD TEST: CPT

## 2022-01-14 PROCEDURE — 74011000250 HC RX REV CODE- 250: Performed by: HOSPITALIST

## 2022-01-14 PROCEDURE — 74011636637 HC RX REV CODE- 636/637: Performed by: NURSE PRACTITIONER

## 2022-01-14 PROCEDURE — 74011250637 HC RX REV CODE- 250/637: Performed by: PSYCHIATRY & NEUROLOGY

## 2022-01-14 PROCEDURE — 74011250637 HC RX REV CODE- 250/637: Performed by: STUDENT IN AN ORGANIZED HEALTH CARE EDUCATION/TRAINING PROGRAM

## 2022-01-14 PROCEDURE — 74011636637 HC RX REV CODE- 636/637: Performed by: INTERNAL MEDICINE

## 2022-01-14 RX ORDER — INSULIN LISPRO 100 [IU]/ML
3 INJECTION, SOLUTION INTRAVENOUS; SUBCUTANEOUS ONCE
Status: COMPLETED | OUTPATIENT
Start: 2022-01-14 | End: 2022-01-14

## 2022-01-14 RX ADMIN — MELATONIN 3 MG: at 22:15

## 2022-01-14 RX ADMIN — LACOSAMIDE 100 MG: 50 TABLET, FILM COATED ORAL at 15:46

## 2022-01-14 RX ADMIN — LEVETIRACETAM 500 MG: 500 TABLET, FILM COATED ORAL at 15:46

## 2022-01-14 RX ADMIN — LACOSAMIDE 100 MG: 50 TABLET, FILM COATED ORAL at 01:48

## 2022-01-14 RX ADMIN — DEXTROSE MONOHYDRATE 12.5 G: 25 INJECTION, SOLUTION INTRAVENOUS at 08:10

## 2022-01-14 RX ADMIN — LEVETIRACETAM 500 MG: 500 TABLET, FILM COATED ORAL at 01:48

## 2022-01-14 RX ADMIN — Medication 3 UNITS: at 21:31

## 2022-01-14 RX ADMIN — Medication 4 UNITS: at 12:04

## 2022-01-14 RX ADMIN — Medication 100 MG: at 12:03

## 2022-01-14 RX ADMIN — AMLODIPINE BESYLATE 5 MG: 5 TABLET ORAL at 12:03

## 2022-01-14 RX ADMIN — MUPIROCIN: 20 OINTMENT TOPICAL at 12:06

## 2022-01-14 NOTE — PROGRESS NOTES
Problem: Falls - Risk of  Goal: *Absence of Falls  Description: Document Parker Blades Fall Risk and appropriate interventions in the flowsheet.   Outcome: Progressing Towards Goal  Note: Fall Risk Interventions:  Mobility Interventions: Patient to call before getting OOB,Communicate number of staff needed for ambulation/transfer    Mentation Interventions: Adequate sleep, hydration, pain control    Medication Interventions: Evaluate medications/consider consulting pharmacy,Patient to call before getting OOB,Teach patient to arise slowly    Elimination Interventions: Call light in reach,Toilet paper/wipes in reach

## 2022-01-14 NOTE — PROGRESS NOTES
End of Shift Note    Bedside shift change report given to Tamra Licea RN (oncoming nurse) by Shazia Tracy RN (offgoing nurse).   Report included the following information SBAR, Kardex, Intake/Output and MAR    Shift worked:  11p-7a     Shift summary and any significant changes:     Pt stable, scheduled meds given by Vandana Carrington RN, no labs ordered, pt cooperative and slept most of shift      0810 - Pt hypoglycemic BG 55 at end of shift (breakfast time) and I helped Tamra Licea RN give the pt dextrose IV 12.5 g   BG improved to 171  Also spoke verbally to Dr. Dai Lopez about his Lantus coverage given to pt overnight and he made changes to the pt's insulin glargine medication   Concerns for physician to address: none     Zone phone for oncoming shift:  [phone]           Shazia Tracy RN

## 2022-01-14 NOTE — PROGRESS NOTES
Hospitalist Progress Note    NAME: Man Josue   :  1965   MRN:  [id number]       Assessment / Plan:  Acute metabolic encephalopathy  Status epilepticus  History of seizure disorder  Aspiration pneumonia  back to baseline  MRI brainno acute abnormality. CT headno acute abnormality. Lumbar puncture doneCSF reveals no infectious etiology so far. CSF culture negative. CSF HSV negative   CSF meningitis panel negative. Out side hospital EEG indication triphasic slowing and focal 3hz right fronto temproal field siezure activety. EEG done here shows no seizure. No more seizure reported on the continuous EEG. Continue Keppra along with Vimpat. Seizure precaution. S/p course of Levaquin  --c/w thiamine  Consulted psychiatryrecommend Haldol as needed. Mental status appears to be at baseline  --Not hypoxic  --PT OT eval and treat. DC planning for next week. CM working on safe discharge plan. Confirming if one of his relatives will be able to help. End-stage renal disease on dialysis  Anemia  Needs outpatient HD. CM working on confirming schedule and chair  --Garcia removed  --c/w Retacrit    DM2 with episodes of hypoglycemia  -hold lantus  -SSI prn    Migraine  Started on Fioricet as needed. Hypertension  Hyperlipidemia  Polysubstance abuse  -c/w amlodipine      Body mass index is 22.55 kg/m². Estimated discharge date: January 10  Barriers: Renal function stabilize    Code status: Full  Prophylaxis: Lovenox  Recommended Disposition: SNF     Subjective:     Chief Complaint / Reason for Physician Visit  Follow up ESRD, DM, HTN      Objective:     VITALS:   Last 24hrs VS reviewed since prior progress note.  Most recent are:  Patient Vitals for the past 24 hrs:   Temp Pulse Resp BP SpO2   22 0319 97.7 °F (36.5 °C) 65 17 121/73 95 %   22 2012 98 °F (36.7 °C) 68 17 133/84 97 %   22 1642 98.6 °F (37 °C) 61 16 125/73 98 %   22 1037 97.6 °F (36.4 °C) 76 17 (!) 145/90 98 %   01/13/22 1000 97.5 °F (36.4 °C) 76 16 (!) 150/88    01/13/22 0945  82 16 (!) 148/98    01/13/22 0930  88 16 (!) 140/85    01/13/22 0915  88 16 (!) 140/86    01/13/22 0900  86 16 123/84    01/13/22 0845  94 16 138/80    01/13/22 0830  85 16 (!) 147/89        Intake/Output Summary (Last 24 hours) at 1/14/2022 0826  Last data filed at 1/13/2022 1000  Gross per 24 hour   Intake    Output 300 ml   Net -300 ml        I had a face to face encounter and independently examined this patient on 1/14/2022, as outlined below:  PHYSICAL EXAM:  General: WD, WN. Awake, not alert, cooperative, no acute distress    EENT:  EOMI. Anicteric sclerae. MMM  Resp:  CTA bilaterally, no wheezing or rales. No accessory muscle use  CV:  Regular  rhythm,  No edema, permcath left chest wall  GI:  Soft, Non distended, Non tender. +Bowel sounds  Neurologic:  Awake and alert, does not appear confused, normal speech,   Psych:   Good insight. Not anxious nor agitated  Skin:  No rashes. No jaundice, multiple tatoos    Reviewed most current lab test results and cultures  YES  Reviewed most current radiology test results   YES  Review and summation of old records today    NO  Reviewed patient's current orders and MAR    YES  PMH/ reviewed - no change compared to H&P  ________________________________________________________________________  Care Plan discussed with:    Comments   Patient x    Family      RN x    Care Manager     Consultant                       x Multidiciplinary team rounds were held today with , nursing, pharmacist and clinical coordinator. Patient's plan of care was discussed; medications were reviewed and discharge planning was addressed.      ________________________________________________________________________  Total NON critical care TIME:  5  Minutes    Total CRITICAL CARE TIME Spent:   Minutes non procedure based      Comments   >50% of visit spent in counseling and coordination of care     ________________________________________________________________________  Juan M Scales MD     Procedures: see electronic medical records for all procedures/Xrays and details which were not copied into this note but were reviewed prior to creation of Plan. LABS:  I reviewed today's most current labs and imaging studies.   Pertinent labs include:  Recent Labs     01/13/22 0658   WBC 6.1   HGB 8.9*   HCT 28.0*        Recent Labs     01/13/22 0658   *   K 4.9   CL 98   CO2 29   *   BUN 33*   CREA 3.74*   CA 8.8   PHOS 5.0*   ALB 2.7*       Signed: Juan M Scales MD

## 2022-01-14 NOTE — PROGRESS NOTES
Nephrology Progress Note  Dorian Antonio     [email or web address]  Phone - [phone]   Patient: Lucretia Calderon    YOB: 1965        Date- 1/14/2022   Admit Date: 12/28/2021  CC: Follow up for eusebia  IMPRESSION & PLAN:    EUSEBIA  ON HD- AIN - on dialysis  with [facility]   Interstitial nephritis determined by biopsy NSAID induced   Hypertension   Anemia   ACUTE Metabolic encephalopathy   Hx of seizures    PLAN-   Check labs in am   Watch for renal recovery   Continue norvasc   epogen for anemia   If cr worse - he will need hd saturday         Subjective: Interval History:   Making good urine  bp stable  S/p hd yesterday  No labs done today      1/13/22  SEEN ON HD  CR 3.7 -- eusebia improving ? C/o  nausea  No sob      Objective:   Vitals:    01/13/22 1037 01/13/22 1642 01/13/22 2012 01/14/22 0319   BP: (!) 145/90 125/73 133/84 121/73   Pulse: 76 61 68 65   Resp: 17 16 17 17   Temp: 97.6 °F (36.4 °C) 98.6 °F (37 °C) 98 °F (36.7 °C) 97.7 °F (36.5 °C)   TempSrc:       SpO2: 98% 98% 97% 95%   Weight:       Height:          01/13 0701 - 01/14 0700  In: -   Out: 300   Last 3 Recorded Weights in this Encounter    01/06/22 2249 01/08/22 0645 01/10/22 0621   Weight: 53.3 kg (117 lb 9.6 oz) 53.6 kg (118 lb 1.6 oz) 55.9 kg (123 lb 4.8 oz)      Physical exam:   GEN:nad  NECK:  Supple, no thyromegaly  RESP: clear b/l, no  wheezing,   CVS: RRR,S1,S2   NEURO: non focal,  normal speech  EXT:no Edema +nt     Right permcath +      Chart reviewed. Pertinent Notes reviewed. Data Review :  Recent Labs     01/13/22  0658   *   K 4.9   CL 98   CO2 29   BUN 33*   CREA 3.74*   *   CA 8.8   PHOS 5.0*     Recent Labs     01/13/22  0658   WBC 6.1   HGB 8.9*   HCT 28.0*        No results for input(s): FE, TIBC, PSAT, FERR in the last 72 hours.    Medication list  reviewed  Current Facility-Administered Medications   Medication Dose Route Frequency  traMADoL (ULTRAM) tablet 50 mg  50 mg Oral Q6H PRN    mupirocin (BACTROBAN) 2 % ointment   Topical DAILY    [Held by provider] insulin lispro (HUMALOG) injection 5 Units  5 Units SubCUTAneous TIDAC    [Held by provider] insulin glargine (LANTUS) injection 15 Units  15 Units SubCUTAneous QHS    epoetin jeff-epbx (RETACRIT) injection 4,000 Units  4,000 Units SubCUTAneous Q TUE, THU & SAT    melatonin tablet 3 mg  3 mg Oral QHS    thiamine mononitrate (B-1) tablet 100 mg  100 mg Oral DAILY    butalbital-acetaminophen-caffeine (FIORICET, ESGIC) -40 mg per tablet 1 Tablet  1 Tablet Oral Q6H PRN    amLODIPine (NORVASC) tablet 5 mg  5 mg Oral DAILY    heparin (porcine) 1,000 unit/mL injection 1,700 Units  1,700 Units InterCATHeter DIALYSIS PRN    And    heparin (porcine) 1,000 unit/mL injection 1,600 Units  1,600 Units InterCATHeter DIALYSIS PRN    levETIRAcetam (KEPPRA) tablet 500 mg  500 mg Oral BID    lacosamide (VIMPAT) tablet 100 mg  100 mg Oral Q12H    hydrALAZINE (APRESOLINE) 20 mg/mL injection 10 mg  10 mg IntraVENous Q6H PRN    ondansetron (ZOFRAN ODT) tablet 4 mg  4 mg Oral Q8H PRN    Or    ondansetron (ZOFRAN) injection 4 mg  4 mg IntraVENous Q6H PRN    acetaminophen (TYLENOL) tablet 650 mg  650 mg Oral Q4H PRN    Or    acetaminophen (TYLENOL) solution 650 mg  650 mg Per NG tube Q4H PRN    Or    acetaminophen (TYLENOL) suppository 650 mg  650 mg Rectal Q4H PRN    insulin lispro (HUMALOG) injection   SubCUTAneous AC&HS    glucose chewable tablet 16 g  4 Tablet Oral PRN    dextrose (D50W) injection syrg 12.5-25 g  12.5-25 g IntraVENous PRN    glucagon (GLUCAGEN) injection 1 mg  1 mg IntraMUSCular PRN          Ronnie Gore MD              [facility]  [facility]  [place], [address] [place]  Phone - [phone]               Fax - [phone]

## 2022-01-14 NOTE — PROGRESS NOTES
Problem: Falls - Risk of  Goal: *Absence of Falls  Description: Document Marquise Vazquez Fall Risk and appropriate interventions in the flowsheet.   Outcome: Progressing Towards Goal  Note: Fall Risk Interventions:  Mobility Interventions: Patient to call before getting OOB    Mentation Interventions: Adequate sleep, hydration, pain control    Medication Interventions: Patient to call before getting OOB    Elimination Interventions: Call light in reach

## 2022-01-14 NOTE — PROGRESS NOTES
End of Shift Note    Bedside shift change report given to Mary Anand RN (oncoming nurse) by Alicja Mckinney, RN (offgoing nurse). Report included the following information SBAR, Kardex and MAR    Shift worked:  7a - 7p     Shift summary and any significant changes:    IV D50 given x 1 this morning for BG 55. Pt's BG improved to 171. Held 7:30 dose of humalog since pt had just been tx for hypoglycemia. Dr. Geneva Lugo held pt's lantus. No prn meds given. Pt thinks last known seizure was a few months ago. Pt stated he experiences tremors, especially at night. Concerns for physician to address:       Zone phone for oncoming shift:          Activity:  Activity Level: Up with Assistance  Number times ambulated in hallways past shift: 0  Number of times OOB to chair past shift: 1    Cardiac:   Cardiac Monitoring: No      Cardiac Rhythm:  (regular)    Access:   Current line(s): PIV and HD access     Genitourinary:   Urinary status: voiding    Respiratory:   O2 Device: None (Room air)  Chronic home O2 use?: NO  Incentive spirometer at bedside: NO     GI:  Last Bowel Movement Date: 01/11/22  Current diet:  ADULT DIET Easy to Chew; 4 carb choices (60 gm/meal)  DIET ONE TIME MESSAGE  DIET ONE TIME MESSAGE  DIET ONE TIME MESSAGE  DIET ONE TIME MESSAGE  DIET ONE TIME MESSAGE  DIET ONE TIME MESSAGE  DIET ONE TIME MESSAGE  Passing flatus: YES  Tolerating current diet: YES       Pain Management:   Patient states pain is manageable on current regimen: YES    Skin:  Loco Score: 21  Interventions: increase time out of bed, PT/OT consult and nutritional support     Patient Safety:  Fall Score:  Total Score: 3  Interventions: bed/chair alarm, gripper socks, pt to call before getting OOB and stay with me (per policy)  High Fall Risk: Yes    Length of Stay:  Expected LOS: 5d 9h  Actual LOS: 17      Alicja Mckinney, RN

## 2022-01-14 NOTE — PROGRESS NOTES
responded to pt's request to bring AA Bible for him.  looked it up in [facility], could not find one. Instead [employer] was provided, pt kept asking find one for him. Will work on it. Pt shared about his current status and his crisis of losing his house, and patricia who is in hospital with breast cancer, and his future plan of returning back to his previous work, .  provided attentive listening and encouraged his plan. He was thankful for [employer]. Advised of  availability.     [id number] [facility] Stephenie Murphy,Kena.RANDA, Kimberlyn [address] Provider   Paging Service [phone])

## 2022-01-14 NOTE — PROGRESS NOTES
Transition of Care Plan:     RUR:  18%     Disposition: TBD   NEW ESRD:  Requires Dialysis 3 x week at OP facility. Follow up appointments:  As Needed  DME needed: TBD   Transportation at [address] [place] [address] or means to access home: N/A   IM Medicare Letter:   N/A  Is patient a BCPI-A Bundle:                      If yes, was Bundle Letter given?:    Is patient a  and connected with the [facility]?   N/A  If yes, was [employer] transfer form completed and [facility] notified? Caregiver Contact:  Alexandra Mendes  [phone]  Discharge Caregiver contacted prior to discharge? Yes    Chart reviewed. Confirmed with [facility] that pt has ACTIVE Medicaid coverage. Continuing to follow for discharge planning. Per nephrology, will monitor for renal recovery over the weekend. If pt's Creatinine level is 3.2 or below, may be able to remove access and pt would not require HD at d/c. Still awaiting return call from niece who is speaking to family members about where pt can stay at d/c. Will continue to follow.     ALESSANDRO Hassan   Care Manager, [facility]  [phone]

## 2022-01-14 NOTE — PROGRESS NOTES
Received message from patient's nurse stating:    Pts blood sugar is 370. Discussion / orders:     Entered order for lispro 3 units subcutaneous injection x1           Please note that this note was dictated using Dragon computer voice recognition software. Quite often unanticipated grammatical, syntax, homophones, and other interpretive errors are inadvertently transcribed by the computer software. Please disregard these errors. Please excuse any errors that have escaped final proofreading.      Signed by:  Jojo Katz DNP, ACNP-BC

## 2022-01-14 NOTE — PROGRESS NOTES
End of Shift Note    Bedside shift change report given to Marcela Donenlly, RN, RN (oncoming nurse) by Norah Jacob, GERARDO (offgoing nurse). Report included the following information SBAR    Shift worked: days   Shift summary and any significant changes:    Pt had dialysis today and a headache when he returned to the floor. Fioricet given and that helped. Pt ambulated in the [place] x2 and sat up in his chair for a while around dinner time. Wound care performed on sacrum and new dressing applied. Concerns for physician to address:  NONE   [facility] phone for oncoming shift:   [phone]     Patient Information  Taylor Sorto [address]  49 y.o.  12/28/2021 11:05 PM by Jenna Chun MD. James Desai was admitted from half-way     Problem List  Patient Active Problem List    Diagnosis Date Noted    Status epilepticus ([address] 12/28/2021    Aspiration pneumonia ([address] 12/28/2021    ESRD needing dialysis ([address] 12/28/2021    AMS (altered mental status) 12/28/2021    ATN (acute tubular necrosis) ([address] 12/25/2021    Interstitial nephritis determined by biopsy 12/25/2021    Thrombocytopenia ([address] 12/25/2021    Hypertension 12/25/2021    Sepsis ([address] 12/25/2021    Acute metabolic encephalopathy 30/88/2570    Hypoglycemia 11/25/2021    EUSEBIA (acute kidney injury) ([address] 71/49/2121    Metabolic acidemia 00/21/6365    Intractable nausea and vomiting 11/25/2021    Hyperglycemia due to type 2 diabetes mellitus ([address] 05/05/2019    Hyperosmolar non-ketotic state in patient with type 2 diabetes mellitus ([address] 12/16/2018     Past Medical History:   Diagnosis Date    Asthma     Diabetes ([address]     High cholesterol     Seizure ([address]        Core Measures:  CVA: No No  CHF:No No  PNA:No No    Activity:  Activity Level:  Up with Assistance  Number times ambulated in hallways past shift: 0  Number of times OOB to chair past shift: 0    Cardiac:   Cardiac Monitoring: Yes      Cardiac Rhythm:  (regular)    Access:   Current line(s): PIV and HD access     Genitourinary:   Urinary status: Voiding  Urinary Catheter? No    Respiratory:   O2 Device: None (Room air)  Chronic home O2 use?: N/A  Incentive spirometer at bedside: N/A       GI:  Last Bowel Movement Date: 01/11/22  Current diet:  ADULT DIET Easy to Chew; 4 carb choices (60 gm/meal)  DIET ONE TIME MESSAGE  DIET ONE TIME MESSAGE  DIET ONE TIME MESSAGE  DIET ONE TIME MESSAGE  DIET ONE TIME MESSAGE  DIET ONE TIME MESSAGE  Passing flatus: YES  Tolerating current diet: Yes       Pain Management:   Patient states pain is manageable on current regimen: Yes    Skin:  Loco Score: 21  Interventions: increase time out of bed, limit briefs and internal/external urinary devices    Patient Safety:  Fall Score:  Total Score: 3  Interventions: bed/chair alarm, gripper socks and pt to call before getting OOB  High Fall Risk: Yes    DVT prophylaxis:  DVT prophylaxis Med- No  DVT prophylaxis SCD or ESVIN- No     Active Consults:  IP CONSULT TO NEUROLOGY  IP CONSULT TO NEPHROLOGY  IP CONSULT TO PSYCHIATRY    Length of Stay:  Expected LOS: 5d 9h  Actual LOS: 16  Discharge Plan: 1/18/22 with family pending MALIA Sykes RN

## 2022-01-15 LAB
ALBUMIN SERPL-MCNC: 2.9 G/DL (ref 3.5–5)
ANION GAP SERPL CALC-SCNC: 5 MMOL/L (ref 5–15)
BASOPHILS # BLD: 0.1 K/UL (ref 0–0.1)
BASOPHILS NFR BLD: 1 % (ref 0–1)
BUN SERPL-MCNC: 37 MG/DL (ref 6–20)
BUN/CREAT SERPL: 11 (ref 12–20)
CALCIUM SERPL-MCNC: 8.7 MG/DL (ref 8.5–10.1)
CHLORIDE SERPL-SCNC: 96 MMOL/L (ref 97–108)
CO2 SERPL-SCNC: 30 MMOL/L (ref 21–32)
CREAT SERPL-MCNC: 3.52 MG/DL (ref 0.7–1.3)
DIFFERENTIAL METHOD BLD: ABNORMAL
EOSINOPHIL # BLD: 0.1 K/UL (ref 0–0.4)
EOSINOPHIL NFR BLD: 1 % (ref 0–7)
ERYTHROCYTE [DISTWIDTH] IN BLOOD BY AUTOMATED COUNT: 15.1 % (ref 11.5–14.5)
GLUCOSE BLD STRIP.AUTO-MCNC: 157 MG/DL (ref 65–117)
GLUCOSE BLD STRIP.AUTO-MCNC: 347 MG/DL (ref 65–117)
GLUCOSE BLD STRIP.AUTO-MCNC: 377 MG/DL (ref 65–117)
GLUCOSE BLD STRIP.AUTO-MCNC: 420 MG/DL (ref 65–117)
GLUCOSE SERPL-MCNC: 246 MG/DL (ref 65–100)
HCT VFR BLD AUTO: 27.9 % (ref 36.6–50.3)
HGB BLD-MCNC: 9.1 G/DL (ref 12.1–17)
IMM GRANULOCYTES # BLD AUTO: 0 K/UL (ref 0–0.04)
IMM GRANULOCYTES NFR BLD AUTO: 0 % (ref 0–0.5)
LYMPHOCYTES # BLD: 1.5 K/UL (ref 0.8–3.5)
LYMPHOCYTES NFR BLD: 22 % (ref 12–49)
MCH RBC QN AUTO: 30.6 PG (ref 26–34)
MCHC RBC AUTO-ENTMCNC: 32.6 G/DL (ref 30–36.5)
MCV RBC AUTO: 93.9 FL (ref 80–99)
MONOCYTES # BLD: 0.6 K/UL (ref 0–1)
MONOCYTES NFR BLD: 9 % (ref 5–13)
NEUTS SEG # BLD: 4.6 K/UL (ref 1.8–8)
NEUTS SEG NFR BLD: 67 % (ref 32–75)
NRBC # BLD: 0 K/UL (ref 0–0.01)
NRBC BLD-RTO: 0 PER 100 WBC
PHOSPHATE SERPL-MCNC: 4.7 MG/DL (ref 2.6–4.7)
PLATELET # BLD AUTO: 273 K/UL (ref 150–400)
PMV BLD AUTO: 10.2 FL (ref 8.9–12.9)
POTASSIUM SERPL-SCNC: 5.1 MMOL/L (ref 3.5–5.1)
RBC # BLD AUTO: 2.97 M/UL (ref 4.1–5.7)
SERVICE CMNT-IMP: ABNORMAL
SODIUM SERPL-SCNC: 131 MMOL/L (ref 136–145)
WBC # BLD AUTO: 6.9 K/UL (ref 4.1–11.1)

## 2022-01-15 PROCEDURE — 65660000000 HC RM CCU STEPDOWN

## 2022-01-15 PROCEDURE — 74011636637 HC RX REV CODE- 636/637: Performed by: INTERNAL MEDICINE

## 2022-01-15 PROCEDURE — 74011250636 HC RX REV CODE- 250/636: Performed by: INTERNAL MEDICINE

## 2022-01-15 PROCEDURE — 74011250637 HC RX REV CODE- 250/637: Performed by: PSYCHIATRY & NEUROLOGY

## 2022-01-15 PROCEDURE — 74011250637 HC RX REV CODE- 250/637: Performed by: NURSE PRACTITIONER

## 2022-01-15 PROCEDURE — 80069 RENAL FUNCTION PANEL: CPT

## 2022-01-15 PROCEDURE — 74011250637 HC RX REV CODE- 250/637: Performed by: STUDENT IN AN ORGANIZED HEALTH CARE EDUCATION/TRAINING PROGRAM

## 2022-01-15 PROCEDURE — 74011250637 HC RX REV CODE- 250/637: Performed by: INTERNAL MEDICINE

## 2022-01-15 PROCEDURE — 85025 COMPLETE CBC W/AUTO DIFF WBC: CPT

## 2022-01-15 PROCEDURE — 82962 GLUCOSE BLOOD TEST: CPT

## 2022-01-15 PROCEDURE — 2709999900 HC NON-CHARGEABLE SUPPLY

## 2022-01-15 PROCEDURE — 36415 COLL VENOUS BLD VENIPUNCTURE: CPT

## 2022-01-15 RX ORDER — HEPARIN SODIUM 5000 [USP'U]/ML
5000 INJECTION, SOLUTION INTRAVENOUS; SUBCUTANEOUS EVERY 12 HOURS
Status: DISCONTINUED | OUTPATIENT
Start: 2022-01-15 | End: 2022-01-22 | Stop reason: HOSPADM

## 2022-01-15 RX ORDER — INSULIN LISPRO 100 [IU]/ML
4 INJECTION, SOLUTION INTRAVENOUS; SUBCUTANEOUS
Status: DISCONTINUED | OUTPATIENT
Start: 2022-01-15 | End: 2022-01-18

## 2022-01-15 RX ADMIN — MELATONIN 3 MG: at 21:59

## 2022-01-15 RX ADMIN — HEPARIN SODIUM 5000 UNITS: 5000 INJECTION INTRAVENOUS; SUBCUTANEOUS at 15:20

## 2022-01-15 RX ADMIN — AMLODIPINE BESYLATE 5 MG: 5 TABLET ORAL at 10:39

## 2022-01-15 RX ADMIN — TRAMADOL HYDROCHLORIDE 50 MG: 50 TABLET, COATED ORAL at 15:20

## 2022-01-15 RX ADMIN — LEVETIRACETAM 500 MG: 500 TABLET, FILM COATED ORAL at 01:17

## 2022-01-15 RX ADMIN — Medication 6 UNITS: at 17:20

## 2022-01-15 RX ADMIN — EPOETIN ALFA-EPBX 4000 UNITS: 4000 INJECTION, SOLUTION INTRAVENOUS; SUBCUTANEOUS at 22:00

## 2022-01-15 RX ADMIN — LEVETIRACETAM 500 MG: 500 TABLET, FILM COATED ORAL at 13:45

## 2022-01-15 RX ADMIN — Medication 8 UNITS: at 08:38

## 2022-01-15 RX ADMIN — LACOSAMIDE 100 MG: 50 TABLET, FILM COATED ORAL at 01:17

## 2022-01-15 RX ADMIN — Medication 4 UNITS: at 17:20

## 2022-01-15 RX ADMIN — MUPIROCIN: 20 OINTMENT TOPICAL at 10:41

## 2022-01-15 RX ADMIN — Medication 2 UNITS: at 22:11

## 2022-01-15 RX ADMIN — TRAMADOL HYDROCHLORIDE 50 MG: 50 TABLET, COATED ORAL at 22:12

## 2022-01-15 RX ADMIN — Medication 100 MG: at 10:39

## 2022-01-15 RX ADMIN — LACOSAMIDE 100 MG: 50 TABLET, FILM COATED ORAL at 13:45

## 2022-01-15 RX ADMIN — SODIUM ZIRCONIUM CYCLOSILICATE 10 G: 10 POWDER, FOR SUSPENSION ORAL at 10:40

## 2022-01-15 RX ADMIN — BUTALBITAL, ACETAMINOPHEN, AND CAFFEINE 1 TABLET: 50; 325; 40 TABLET ORAL at 13:49

## 2022-01-15 NOTE — PROGRESS NOTES
End of Shift Note    Bedside shift change report given to Ivanna Garcia RN (oncoming nurse) by Delmy Whitney RN (offgoing nurse). Report included the following information SBAR, Kardex and MAR    Shift worked:  nights     Shift summary and any significant changes:    B/G 350 LANTUS 3units ordered by NP     Concerns for physician to address:  none     Zone phone for oncoming shift:          Activity:  Activity Level: Up with Assistance  Number times ambulated in hallways past shift: 0  Number of times OOB to chair past shift: 1    Cardiac:   Cardiac Monitoring: No      Cardiac Rhythm:  (regular)    Access:   Current line(s): PIV and HD access     Genitourinary:   Urinary status: voiding    Respiratory:   O2 Device: None (Room air)  Chronic home O2 use?: NO  Incentive spirometer at bedside: NO     GI:  Last Bowel Movement Date: 01/14/22  Current diet:  ADULT DIET Easy to Chew; 4 carb choices (60 gm/meal)  DIET ONE TIME MESSAGE  DIET ONE TIME MESSAGE  DIET ONE TIME MESSAGE  DIET ONE TIME MESSAGE  DIET ONE TIME MESSAGE  DIET ONE TIME MESSAGE  DIET ONE TIME MESSAGE  Passing flatus: YES  Tolerating current diet: YES       Pain Management:   Patient states pain is manageable on current regimen: YES    Skin:  Loco Score: 21  Interventions: increase time out of bed, PT/OT consult and nutritional support     Patient Safety:  Fall Score:  Total Score: 3  Interventions: bed/chair alarm, gripper socks, pt to call before getting OOB and stay with me (per policy)  High Fall Risk: Yes    Length of Stay:  Expected LOS: 5d 9h  Actual LOS: [address]. Janie Knapp RN

## 2022-01-15 NOTE — PROGRESS NOTES
Problem: Falls - Risk of  Goal: *Absence of Falls  Description: Document Anya Litter Fall Risk and appropriate interventions in the flowsheet. Outcome: Progressing Towards Goal  Note: Fall Risk Interventions:  Mobility Interventions: Patient to call before getting OOB    Mentation Interventions: Adequate sleep, hydration, pain control    Medication Interventions: Patient to call before getting OOB    Elimination Interventions: Call light in reach              Problem: Patient Education: Go to Patient Education Activity  Goal: Patient/Family Education  Outcome: Progressing Towards Goal     Problem: Pressure Injury - Risk of  Goal: *Prevention of pressure injury  Description: Document Loco Scale and appropriate interventions in the flowsheet.   Outcome: Progressing Towards Goal  Note: Pressure Injury Interventions:  Sensory Interventions: Keep linens dry and wrinkle-free    Moisture Interventions: Absorbent underpads    Activity Interventions: Increase time out of bed    Mobility Interventions: PT/OT evaluation    Nutrition Interventions: Document food/fluid/supplement intake    Friction and Shear Interventions: Lift sheet                Problem: Patient Education: Go to Patient Education Activity  Goal: Patient/Family Education  Outcome: Progressing Towards Goal     Problem: Seizure Disorder (Adult)  Goal: *STG: Remains free of seizure activity  Outcome: Progressing Towards Goal  Goal: *STG: Maintains lab values within therapeutic range  Outcome: Progressing Towards Goal  Goal: *STG/LTG: Complies with medication therapy  Outcome: Progressing Towards Goal  Goal: *STG: Remains free of injury during seizure activity  Outcome: Progressing Towards Goal  Goal: *STG: Remains safe in hospital  Outcome: Progressing Towards Goal  Goal: Interventions  Outcome: Progressing Towards Goal     Problem: Patient Education: Go to Patient Education Activity  Goal: Patient/Family Education  Outcome: Progressing Towards Goal     Problem: Patient Education: Go to Patient Education Activity  Goal: Patient/Family Education  Outcome: Progressing Towards Goal     Problem: Patient Education: Go to Patient Education Activity  Goal: Patient/Family Education  Outcome: Progressing Towards Goal     Problem: Patient Education: Go to Patient Education Activity  Goal: Patient/Family Education  Outcome: Progressing Towards Goal

## 2022-01-15 NOTE — PROGRESS NOTES
Progress Note    Assessment:   Active Problems:    Hypoglycemia (11/25/2021)      Status epilepticus ([facility] (12/28/2021)      Aspiration pneumonia ([facility] [address] (12/28/2021)      ESRD needing dialysis (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  wants to go home, gabriella him needs another 2-3d given creat. told him we will check lbs sunday back mondy      Current Facility-Administered Medications   Medication Dose Route Frequency    sodium zirconium cyclosilicate (LOKELMA) powder packet 10 g  10 g Oral NOW    traMADoL (ULTRAM) tablet 50 mg  50 mg Oral Q6H PRN    mupirocin (BACTROBAN) 2 % ointment   Topical DAILY    [Held by provider] insulin lispro (HUMALOG) injection 5 Units  5 Units SubCUTAneous TIDAC    [Held by provider] insulin glargine (LANTUS) injection 15 Units  15 Units SubCUTAneous QHS    epoetin jeff-epbx (RETACRIT) injection 4,000 Units  4,000 Units SubCUTAneous Q TUE, THU & SAT    melatonin tablet 3 mg  3 mg Oral QHS    thiamine mononitrate (B-1) tablet 100 mg  100 mg Oral DAILY    butalbital-acetaminophen-caffeine (FIORICET, ESGIC) -40 mg per tablet 1 Tablet  1 Tablet Oral Q6H PRN    amLODIPine (NORVASC) tablet 5 mg  5 mg Oral DAILY    heparin (porcine) 1,000 unit/mL injection 1,700 Units  1,700 Units InterCATHeter DIALYSIS PRN    And    heparin (porcine) 1,000 unit/mL injection 1,600 Units  1,600 Units InterCATHeter DIALYSIS PRN    levETIRAcetam (KEPPRA) tablet 500 mg  500 mg Oral BID    lacosamide (VIMPAT) tablet 100 mg 100 mg Oral Q12H    hydrALAZINE (APRESOLINE) 20 mg/mL injection 10 mg  10 mg IntraVENous Q6H PRN    ondansetron (ZOFRAN ODT) tablet 4 mg  4 mg Oral Q8H PRN    Or    ondansetron (ZOFRAN) injection 4 mg  4 mg IntraVENous Q6H PRN    acetaminophen (TYLENOL) tablet 650 mg  650 mg Oral Q4H PRN    Or    acetaminophen (TYLENOL) solution 650 mg  650 mg Per NG tube Q4H PRN    Or    acetaminophen (TYLENOL) suppository 650 mg  650 mg Rectal Q4H PRN    insulin lispro (HUMALOG) injection   SubCUTAneous AC&HS    glucose chewable tablet 16 g  4 Tablet Oral PRN    dextrose (D50W) injection syrg 12.5-25 g  12.5-25 g IntraVENous PRN    glucagon (GLUCAGEN) injection 1 mg  1 mg IntraMUSCular PRN       Review of Systems  Pertinent items are noted in HPI.     Objective:     Visit Vitals  /88 (BP Patient Position: At rest)   Pulse 80   Temp 98.7 °F (37.1 °C)   Resp 18   Ht 5' 2\" (1.575 m)   Wt 55.9 kg (123 lb 4.8 oz)   SpO2 98%   BMI 22.55 kg/m²     Temp (24hrs), Av.4 °F (36.9 °C), Min:97.8 °F (36.6 °C), Max:98.9 °F (37.2 °C)      01/15 0701 - 01/15 1900  In: -   Out: 700 [Urine:700]    Physical Exam:    General [    ] healthy     [x] acutely ill [x    ] chronically ill   [    ] critical      Skin  [x] Nl color/turgor [    ]   Eyes  [x] EOMI [    ]    ENT  [x] clear/moist [    ]     Neck  [x] supple  [    ]    Pulm  [x] no distress [    ]    CV  [] RRR  [    ]   ABD  [] Soft  [    ]      [] Garcia  [    ]    MS  [x] Edema  [    ]     Neuro             [x] nonfocal  [    ]    Psyche           [x] a bit anxious   [    ]       Data Review:     LABS:   Recent Results (from the past 24 hour(s))   GLUCOSE, POC    Collection Time: 22 11:22 AM   Result Value Ref Range    Glucose (POC) 332 (H) 65 - 117 mg/dL    Performed by Saumya Mehta (PCT)    GLUCOSE, POC    Collection Time: 22  4:33 PM   Result Value Ref Range    Glucose (POC) 145 (H) 65 - 117 mg/dL    Performed by Saumya Mehta (PCT)    GLUCOSE, POC Collection Time: 01/14/22  9:08 PM   Result Value Ref Range    Glucose (POC) 350 (H) 65 - 117 mg/dL    Performed by Azeem Munoz RN    CBC WITH AUTOMATED DIFF    Collection Time: 01/15/22 12:58 AM   Result Value Ref Range    WBC 6.9 4.1 - 11.1 K/uL    RBC 2.97 (L) 4.10 - 5.70 M/uL    HGB 9.1 (L) 12.1 - 17.0 g/dL    HCT 27.9 (L) 36.6 - 50.3 %    MCV 93.9 80.0 - 99.0 FL    MCH 30.6 26.0 - 34.0 PG    MCHC 32.6 30.0 - 36.5 g/dL    RDW 15.1 (H) 11.5 - 14.5 %    PLATELET 385 790 - 156 K/uL    MPV 10.2 8.9 - 12.9 FL    NRBC 0.0 0  WBC    ABSOLUTE NRBC 0.00 0.00 - 0.01 K/uL    NEUTROPHILS 67 32 - 75 %    LYMPHOCYTES 22 12 - 49 %    MONOCYTES 9 5 - 13 %    EOSINOPHILS 1 0 - 7 %    BASOPHILS 1 0 - 1 %    IMMATURE GRANULOCYTES 0 0.0 - 0.5 %    ABS. NEUTROPHILS 4.6 1.8 - 8.0 K/UL    ABS. LYMPHOCYTES 1.5 0.8 - 3.5 K/UL    ABS. MONOCYTES 0.6 0.0 - 1.0 K/UL    ABS. EOSINOPHILS 0.1 0.0 - 0.4 K/UL    ABS. BASOPHILS 0.1 0.0 - 0.1 K/UL    ABS. IMM. GRANS. 0.0 0.00 - 0.04 K/UL    DF AUTOMATED     RENAL FUNCTION PANEL    Collection Time: 01/15/22 12:58 AM   Result Value Ref Range    Sodium 131 (L) 136 - 145 mmol/L    Potassium 5.1 3.5 - 5.1 mmol/L    Chloride 96 (L) 97 - 108 mmol/L    CO2 30 21 - 32 mmol/L    Anion gap 5 5 - 15 mmol/L    Glucose 246 (H) 65 - 100 mg/dL    BUN 37 (H) 6 - 20 MG/DL    Creatinine 3.52 (H) 0.70 - 1.30 MG/DL    BUN/Creatinine ratio 11 (L) 12 - 20      GFR est AA 22 (L) >60 ml/min/1.73m2    GFR est non-AA 18 (L) >60 ml/min/1.73m2    Calcium 8.7 8.5 - 10.1 MG/DL    Phosphorus 4.7 2.6 - 4.7 MG/DL    Albumin 2.9 (L) 3.5 - 5.0 g/dL   GLUCOSE, POC    Collection Time: 01/15/22  8:10 AM   Result Value Ref Range    Glucose (POC) 420 (H) 65 - 117 mg/dL    Performed by Anjelica Person (PCT)                  Signed By: Rea Miles MD, PADILLA                      January 15, 2022                      [email or web address]

## 2022-01-15 NOTE — PROGRESS NOTES
Hospitalist Progress Note    NAME: Dafne Dumont   :  1965   MRN:  [id number]       Assessment / Plan:  Acute metabolic encephalopathy  Status epilepticus  History of seizure disorder  Aspiration pneumonia  back to baseline  MRI brainno acute abnormality. CT headno acute abnormality. Lumbar puncture doneCSF reveals no infectious etiology so far. CSF culture negative. CSF HSV negative   CSF meningitis panel negative. Out side hospital EEG indication triphasic slowing and focal 3hz right fronto temproal field siezure activety. EEG done here shows no seizure. No more seizure reported on the continuous EEG. Continue Keppra along with Vimpat. Seizure precaution. S/p course of Levaquin  --c/w thiamine  Consulted psychiatryrecommend Haldol as needed. Mental status appears to be at baseline  --Not hypoxic  --No rehab needs. CM working on safe discharge plan. Confirming if one of his relatives will be able to help. End-stage renal disease on dialysis  Anemia. --Garcia removed  --c/w Retacrit  --following Cr, renal recovery? .   HD per renal    DM2 with episodes of hypoglycemia  -hold lantus  -add back premeal humalog + SSI prn    Migraine  Started on Fioricet as needed. Hypertension  Hyperlipidemia  Polysubstance abuse  -c/w amlodipine      Body mass index is 22.55 kg/m². Estimated discharge date: January 10  Barriers: Renal function stabilize    Code status: Full  Prophylaxis: Lovenox  Recommended Disposition: SNF     Subjective:     Chief Complaint / Reason for Physician Visit  Follow up ESRD, DM, HTN      Objective:     VITALS:   Last 24hrs VS reviewed since prior progress note.  Most recent are:  Patient Vitals for the past 24 hrs:   Temp Pulse Resp BP SpO2   01/15/22 1147 98.4 °F (36.9 °C) 76 18 (!) 154/86 98 %   01/15/22 0810 98.2 °F (36.8 °C) 72 18 (!) 157/93 97 %   01/15/22 0406 98.7 °F (37.1 °C) 80 18 133/88 98 %   22 2300 98.9 °F (37.2 °C) 77 17 (!) 144/88 99 %   01/14/22 2008 97.8 °F (36.6 °C) 68 18 134/87 97 %   01/14/22 1622 98.1 °F (36.7 °C) 72 16 (!) 140/85 98 %       Intake/Output Summary (Last 24 hours) at 1/15/2022 1204  Last data filed at 1/15/2022 0745  Gross per 24 hour   Intake    Output 700 ml   Net -700 ml        I had a face to face encounter and independently examined this patient on 1/15/2022, as outlined below:  PHYSICAL EXAM:  General: WD, WN. Awake, not alert, cooperative, no acute distress    EENT:  EOMI. Anicteric sclerae. MMM  Resp:  CTA bilaterally, no wheezing or rales. No accessory muscle use  CV:  Regular  rhythm,  No edema, permcath left chest wall  GI:  Soft, Non distended, Non tender. +Bowel sounds  Neurologic:  Awake and alert, does not appear confused, normal speech,   Psych:   Good insight. Not anxious nor agitated  Skin:  No rashes. No jaundice, multiple tatoos    Reviewed most current lab test results and cultures  YES  Reviewed most current radiology test results   YES  Review and summation of old records today    NO  Reviewed patient's current orders and MAR    YES  PMH/ reviewed - no change compared to H&P  ________________________________________________________________________  Care Plan discussed with:    Comments   Patient x    Family      RN x    Care Manager     Consultant                       x Multidiciplinary team rounds were held today with , nursing, pharmacist and clinical coordinator. Patient's plan of care was discussed; medications were reviewed and discharge planning was addressed.      ________________________________________________________________________  Total NON critical care TIME:  5  Minutes    Total CRITICAL CARE TIME Spent:   Minutes non procedure based      Comments   >50% of visit spent in counseling and coordination of care     ________________________________________________________________________  Blayne Keys MD     Procedures: see electronic medical records for all procedures/Xrays and details which were not copied into this note but were reviewed prior to creation of Plan. LABS:  I reviewed today's most current labs and imaging studies.   Pertinent labs include:  Recent Labs     01/15/22  0058 01/13/22  0658   WBC 6.9 6.1   HGB 9.1* 8.9*   HCT 27.9* 28.0*    311     Recent Labs     01/15/22  0058 01/13/22  0658   * 134*   K 5.1 4.9   CL 96* 98   CO2 30 29   * 187*   BUN 37* 33*   CREA 3.52* 3.74*   CA 8.7 8.8   PHOS 4.7 5.0*   ALB 2.9* 2.7*       Signed: Sabrina Duggan MD

## 2022-01-15 NOTE — PROGRESS NOTES
Received message from patient's nurse stating:             Discussion / orders:     Entered order for lispro 3 units subcutaneous injection x1           Please note that this note was dictated using Dragon computer voice recognition software. Quite often unanticipated grammatical, syntax, homophones, and other interpretive errors are inadvertently transcribed by the computer software. Please disregard these errors. Please excuse any errors that have escaped final proofreading.      Signed by:  Paola Metzger DNP, ACNP-BC

## 2022-01-16 LAB
GLUCOSE BLD STRIP.AUTO-MCNC: 137 MG/DL (ref 65–117)
GLUCOSE BLD STRIP.AUTO-MCNC: 342 MG/DL (ref 65–117)
GLUCOSE BLD STRIP.AUTO-MCNC: 422 MG/DL (ref 65–117)
GLUCOSE BLD STRIP.AUTO-MCNC: 455 MG/DL (ref 65–117)
SERVICE CMNT-IMP: ABNORMAL

## 2022-01-16 PROCEDURE — 74011250637 HC RX REV CODE- 250/637: Performed by: PSYCHIATRY & NEUROLOGY

## 2022-01-16 PROCEDURE — 82962 GLUCOSE BLOOD TEST: CPT

## 2022-01-16 PROCEDURE — 74011636637 HC RX REV CODE- 636/637: Performed by: NURSE PRACTITIONER

## 2022-01-16 PROCEDURE — 74011636637 HC RX REV CODE- 636/637: Performed by: INTERNAL MEDICINE

## 2022-01-16 PROCEDURE — 74011250637 HC RX REV CODE- 250/637: Performed by: STUDENT IN AN ORGANIZED HEALTH CARE EDUCATION/TRAINING PROGRAM

## 2022-01-16 PROCEDURE — 65660000000 HC RM CCU STEPDOWN

## 2022-01-16 PROCEDURE — 74011250636 HC RX REV CODE- 250/636: Performed by: INTERNAL MEDICINE

## 2022-01-16 RX ORDER — INSULIN LISPRO 100 [IU]/ML
4 INJECTION, SOLUTION INTRAVENOUS; SUBCUTANEOUS ONCE
Status: COMPLETED | OUTPATIENT
Start: 2022-01-16 | End: 2022-01-16

## 2022-01-16 RX ADMIN — Medication 4 UNITS: at 09:18

## 2022-01-16 RX ADMIN — LEVETIRACETAM 500 MG: 500 TABLET, FILM COATED ORAL at 12:18

## 2022-01-16 RX ADMIN — HEPARIN SODIUM 5000 UNITS: 5000 INJECTION INTRAVENOUS; SUBCUTANEOUS at 03:27

## 2022-01-16 RX ADMIN — Medication 4 UNITS: at 12:19

## 2022-01-16 RX ADMIN — MELATONIN 3 MG: at 21:30

## 2022-01-16 RX ADMIN — Medication 8 UNITS: at 09:18

## 2022-01-16 RX ADMIN — MUPIROCIN: 20 OINTMENT TOPICAL at 09:20

## 2022-01-16 RX ADMIN — Medication 100 MG: at 09:17

## 2022-01-16 RX ADMIN — AMLODIPINE BESYLATE 5 MG: 5 TABLET ORAL at 09:17

## 2022-01-16 RX ADMIN — Medication 4 UNITS: at 12:18

## 2022-01-16 RX ADMIN — HEPARIN SODIUM 5000 UNITS: 5000 INJECTION INTRAVENOUS; SUBCUTANEOUS at 14:46

## 2022-01-16 RX ADMIN — Medication 4 UNITS: at 17:23

## 2022-01-16 RX ADMIN — LACOSAMIDE 100 MG: 50 TABLET, FILM COATED ORAL at 03:27

## 2022-01-16 RX ADMIN — Medication 4 UNITS: at 22:26

## 2022-01-16 RX ADMIN — LACOSAMIDE 100 MG: 50 TABLET, FILM COATED ORAL at 14:46

## 2022-01-16 RX ADMIN — LEVETIRACETAM 500 MG: 500 TABLET, FILM COATED ORAL at 03:27

## 2022-01-16 NOTE — PROGRESS NOTES
End of Shift Note    Bedside shift change report given to Andrew RN (oncoming nurse) by Elizabeth Goetz RN (offgoing nurse). Report included the following information SBAR    Shift worked:  DAYS   Shift summary and any significant changes:    -Seen by renal, NO HD today     Concerns for physician to address:  NONE   Zone phone for oncoming shift:   [phone]     Patient Information  Natalee Nolasco [address]  49 y.o.  12/28/2021 11:05 PM by Duffy Heimlich, MD. Ventura Irizarry was admitted from [facility]     Problem List  Patient Active Problem List    Diagnosis Date Noted    Status epilepticus ([address] 12/28/2021    Aspiration pneumonia ([address] 12/28/2021    ESRD needing dialysis ([address] 12/28/2021    AMS (altered mental status) 12/28/2021    ATN (acute tubular necrosis) ([address] 12/25/2021    Interstitial nephritis determined by biopsy 12/25/2021    Thrombocytopenia ([address] 12/25/2021    Hypertension 12/25/2021    Sepsis ([address] 12/25/2021    Acute metabolic encephalopathy 52/37/9294    Hypoglycemia 11/25/2021    EUSEBIA (acute kidney injury) ([address] 82/27/8458    Metabolic acidemia 68/15/1747    Intractable nausea and vomiting 11/25/2021    Hyperglycemia due to type 2 diabetes mellitus ([address] 05/05/2019    Hyperosmolar non-ketotic state in patient with type 2 diabetes mellitus ([address] 12/16/2018     Past Medical History:   Diagnosis Date    Asthma     Diabetes ([address]     High cholesterol     Seizure ([address]        Core Measures:  CVA: No No  CHF:No No  PNA:No No    Activity:  Activity Level: Up with Assistance  Number times ambulated in hallways past shift: 0  Number of times OOB to chair past shift: 0    Cardiac:   Cardiac Monitoring: Yes      Cardiac Rhythm:  (regular)    Access:   Current line(s): PIV and HD access     Genitourinary:   Urinary status: NO  Urinary Catheter?  No     Respiratory:   O2 Device: None (Room air)  Chronic home O2 use?: N/A  Incentive spirometer at bedside: N/A       GI:  Last Bowel Movement Date: 01/14/22  Current diet:  DIET ONE TIME MESSAGE  DIET ONE TIME MESSAGE  DIET ONE TIME MESSAGE  DIET ONE TIME MESSAGE  DIET ONE TIME MESSAGE  DIET ONE TIME MESSAGE  DIET ONE TIME MESSAGE  ADULT DIET Regular; 5 carb choices (75 gm/meal); Low Potassium (Less than 3000 mg/day)  Passing flatus: YES  Tolerating current diet: NO       Pain Management:   Patient states pain is manageable on current regimen: N/A    Skin:  Loco Score: 19  Interventions: increase time out of bed, limit briefs and internal/external urinary devices    Patient Safety:  Fall Score:  Total Score: 2  Interventions: bed/chair alarm, gripper socks and pt to call before getting OOB  High Fall Risk: Yes    DVT prophylaxis:  DVT prophylaxis Med- No  DVT prophylaxis SCD or ESVIN- No     Active Consults:  IP CONSULT TO NEUROLOGY  IP CONSULT TO NEPHROLOGY  IP CONSULT TO PSYCHIATRY    Length of Stay:  Expected LOS: 5d 9h  Actual LOS: 18  Discharge Plan: PENDING PLACEMENT    Nicholas Fernandez RN

## 2022-01-16 NOTE — PROGRESS NOTES
0730 Bedside shift change report given to [address] (oncoming nurse) by Anne Medrano RN (offgoing nurse). Report included the following information SBAR and Kardex.

## 2022-01-16 NOTE — PROGRESS NOTES
Hospitalist Progress Note    NAME: Navdeep Castaneda   :  1965   MRN:  [id number]       Assessment / Plan:  Acute metabolic encephalopathy  Status epilepticus  History of seizure disorder  Aspiration pneumonia  back to baseline  MRI brainno acute abnormality. CT headno acute abnormality. Lumbar puncture doneCSF reveals no infectious etiology so far. CSF culture negative. CSF HSV negative   CSF meningitis panel negative. Out side hospital EEG indication triphasic slowing and focal 3hz right fronto temproal field siezure activety. EEG done here shows no seizure. No more seizure reported on the continuous EEG. Continue Keppra along with Vimpat. Seizure precaution. S/p course of Levaquin  --c/w thiamine  Consulted psychiatryrecommend Haldol as needed. Mental status appears to be at baseline  --Not hypoxic  --No rehab needs. CM working on safe discharge plan. Confirming if one of his relatives will be able to help. End-stage renal disease on dialysis  Anemia. --Garcia removed  --c/w Retacrit  --following Cr and waiting for renal recovery. Arranging for outpatient hemodialysis if renal feels it is still necessary. DM2 with episodes of hypoglycemia  -Adding back Lantus and titrating slowly  -add back premeal humalog + SSI prn    Migraine  Fioricet as needed. Hypertension  Hyperlipidemia  Polysubstance abuse  -c/w amlodipine      Body mass index is 22.55 kg/m². Estimated discharge date: January 10  Barriers: Renal function stabilize    Code status: Full  Prophylaxis: Lovenox  Recommended Disposition: SNF     Subjective:     Chief Complaint / Reason for Physician Visit  Follow up ESRD, DM, HTN      Objective:     VITALS:   Last 24hrs VS reviewed since prior progress note.  Most recent are:  Patient Vitals for the past 24 hrs:   Temp Pulse Resp BP SpO2   22 0800 97.5 °F (36.4 °C) 72 18 (!) 159/99 99 %   22 0404 98.3 °F (36.8 °C) 65 18 (!) 160/91 98 % 01/15/22 2303 97.6 °F (36.4 °C) 63 16 (!) 154/93 97 %   01/15/22 1920 98.3 °F (36.8 °C) 74 18 (!) 164/96 98 %   01/15/22 1514 98 °F (36.7 °C) 67 16 (!) 145/42 98 %   01/15/22 1147 98.4 °F (36.9 °C) 76 18 (!) 154/86 98 %       Intake/Output Summary (Last 24 hours) at 1/16/2022 0848  Last data filed at 1/16/2022 6573  Gross per 24 hour   Intake 600 ml   Output 2375 ml   Net -1775 ml        I had a face to face encounter and independently examined this patient on 1/16/2022, as outlined below:  PHYSICAL EXAM:  General: WD, WN. Awake, not alert, cooperative, no acute distress    EENT:  EOMI. Anicteric sclerae. MMM  Resp:  CTA bilaterally, no wheezing or rales. No accessory muscle use  CV:  Regular  rhythm,  No edema, permcath left chest wall  GI:  Soft, Non distended, Non tender. +Bowel sounds  Neurologic:  Awake and alert, does not appear confused, normal speech,   Psych:   Good insight. Not anxious nor agitated  Skin:  No rashes. No jaundice, multiple tatoos    Reviewed most current lab test results and cultures  YES  Reviewed most current radiology test results   YES  Review and summation of old records today    NO  Reviewed patient's current orders and MAR    YES  PMH/SH reviewed - no change compared to H&P  ________________________________________________________________________  Care Plan discussed with:    Comments   Patient x    Family      RN x    Care Manager     Consultant                       x Multidiciplinary team rounds were held today with , nursing, pharmacist and clinical coordinator. Patient's plan of care was discussed; medications were reviewed and discharge planning was addressed.      ________________________________________________________________________  Total NON critical care TIME:  5  Minutes    Total CRITICAL CARE TIME Spent:   Minutes non procedure based      Comments   >50% of visit spent in counseling and coordination of care ________________________________________________________________________  Becca Santana MD     Procedures: see electronic medical records for all procedures/Xrays and details which were not copied into this note but were reviewed prior to creation of Plan. LABS:  I reviewed today's most current labs and imaging studies.   Pertinent labs include:  Recent Labs     01/15/22  0058   WBC 6.9   HGB 9.1*   HCT 27.9*        Recent Labs     01/15/22  0058   *   K 5.1   CL 96*   CO2 30   *   BUN 37*   CREA 3.52*   CA 8.7   PHOS 4.7   ALB 2.9*       Signed: Becca Santana MD

## 2022-01-16 NOTE — PROGRESS NOTES
End of Shift Note    Bedside shift change report given to Brijesh Harrisonomid Man (oncoming nurse) by Arely Cramer RN (offgoing nurse). Report included the following information SBAR    Shift worked:  7p-7a   Shift summary and any significant changes:    Pt in bed, Lispro 2 units given at 2200. IV removed. Concerns for physician to address:  NONE   Zone phone for oncoming shift:   [phone]     Patient Information  Lakeishayusuf Winn [address]  49 y.o.  12/28/2021 11:05 PM by Lindsey Litten, MD. Benny Cherry was admitted from care home     Problem List  Patient Active Problem List    Diagnosis Date Noted    Status epilepticus ([address] 12/28/2021    Aspiration pneumonia ([address] 12/28/2021    ESRD needing dialysis ([address] 12/28/2021    AMS (altered mental status) 12/28/2021    ATN (acute tubular necrosis) ([address] 12/25/2021    Interstitial nephritis determined by biopsy 12/25/2021    Thrombocytopenia ([address] 12/25/2021    Hypertension 12/25/2021    Sepsis ([address] 12/25/2021    Acute metabolic encephalopathy 00/84/0499    Hypoglycemia 11/25/2021    EUSEBIA (acute kidney injury) ([address] 82/46/1927    Metabolic acidemia 09/83/6449    Intractable nausea and vomiting 11/25/2021    Hyperglycemia due to type 2 diabetes mellitus ([address] 05/05/2019    Hyperosmolar non-ketotic state in patient with type 2 diabetes mellitus ([address] 12/16/2018     Past Medical History:   Diagnosis Date    Asthma     Diabetes ([address]     High cholesterol     Seizure ([address]        Core Measures:  CVA: No No  CHF:No No  PNA:No No    Activity:  Activity Level: Up with Assistance  Number times ambulated in hallways past shift: 0  Number of times OOB to chair past shift: 0    Cardiac:   Cardiac Monitoring: Yes      Cardiac Rhythm:  (regular)    Access:   Current line(s): PIV and HD access     Genitourinary:   Urinary status: NO  Urinary Catheter?  No     Respiratory:   O2 Device: None (Room air)  Chronic home O2 use?: N/A  Incentive spirometer at bedside: N/A       GI:  Last Bowel Movement Date: 01/14/22  Current diet:  DIET ONE TIME MESSAGE  DIET ONE TIME MESSAGE  DIET ONE TIME MESSAGE  DIET ONE TIME MESSAGE  DIET ONE TIME MESSAGE  DIET ONE TIME MESSAGE  DIET ONE TIME MESSAGE  ADULT DIET Regular; 5 carb choices (75 gm/meal); Low Potassium (Less than 3000 mg/day)  Passing flatus: YES  Tolerating current diet: NO       Pain Management:   Patient states pain is manageable on current regimen: N/A    Skin:  Loco Score: 19  Interventions: increase time out of bed, limit briefs and internal/external urinary devices    Patient Safety:  Fall Score:  Total Score: 2  Interventions: bed/chair alarm, gripper socks and pt to call before getting OOB  High Fall Risk: Yes    DVT prophylaxis:  DVT prophylaxis Med- No  DVT prophylaxis SCD or ESVIN- No     Active Consults:  IP CONSULT TO NEUROLOGY  IP CONSULT TO NEPHROLOGY  IP CONSULT TO PSYCHIATRY    Length of Stay:  Expected LOS: 5d 9h  Actual LOS: 19  Discharge Plan: PENDING PLACEMENT    Michael Kan RN

## 2022-01-16 NOTE — PROGRESS NOTES
End of Shift Note    Bedside shift change report given to Rosi CARRILLO (oncoming nurse) by Ariel Soliman (offgoing nurse). Report included the following information SBAR and Kardex    Shift worked:  2028-7359     Shift summary and any significant changes:    Patient requested double portions for meals and specific meals for breakfast, lunch and dinner. Educated patient on the importance of monitoring potassium intake. Concerns for physician to address:  None     Zone phone for oncoming shift:   [phone]       Activity:  Activity Level: Up with Assistance  Number times ambulated in hallways past shift: 0  Number of times OOB to chair past shift: 1    Cardiac:   Cardiac Monitoring: No      Cardiac Rhythm:  (regular)    Access:   Current line(s): HD access     Genitourinary:   Urinary status: voiding    Respiratory:   O2 Device: None (Room air)  Chronic home O2 use?: NO  Incentive spirometer at bedside: NO     GI:  Last Bowel Movement Date: 01/14/21  Current diet:  DIET ONE TIME MESSAGE  DIET ONE TIME MESSAGE  DIET ONE TIME MESSAGE  DIET ONE TIME MESSAGE  DIET ONE TIME MESSAGE  DIET ONE TIME MESSAGE  DIET ONE TIME MESSAGE  ADULT DIET Regular; 5 carb choices (75 gm/meal); Low Potassium (Less than 3000 mg/day)  DIET ONE TIME MESSAGE  Passing flatus: YES  Tolerating current diet: YES       Pain Management:   Patient states pain is manageable on current regimen: N/A    Skin:  Loco Score: 19  Interventions: increase time out of bed    Patient Safety:  Fall Score:  Total Score: 3  Interventions: bed/chair alarm, assistive device (walker, cane, etc), gripper socks and pt to call before getting OOB  High Fall Risk: Yes    Length of Stay:  Expected LOS: 5d 9h  Actual LOS: [address]

## 2022-01-17 LAB
ALBUMIN SERPL-MCNC: 3 G/DL (ref 3.5–5)
ANION GAP SERPL CALC-SCNC: 7 MMOL/L (ref 5–15)
BUN SERPL-MCNC: 61 MG/DL (ref 6–20)
BUN/CREAT SERPL: 13 (ref 12–20)
CALCIUM SERPL-MCNC: 8.9 MG/DL (ref 8.5–10.1)
CHLORIDE SERPL-SCNC: 95 MMOL/L (ref 97–108)
CO2 SERPL-SCNC: 27 MMOL/L (ref 21–32)
CREAT SERPL-MCNC: 4.73 MG/DL (ref 0.7–1.3)
ERYTHROCYTE [DISTWIDTH] IN BLOOD BY AUTOMATED COUNT: 15.7 % (ref 11.5–14.5)
GLUCOSE BLD STRIP.AUTO-MCNC: 247 MG/DL (ref 65–117)
GLUCOSE BLD STRIP.AUTO-MCNC: 266 MG/DL (ref 65–117)
GLUCOSE BLD STRIP.AUTO-MCNC: 440 MG/DL (ref 65–117)
GLUCOSE BLD STRIP.AUTO-MCNC: 498 MG/DL (ref 65–117)
GLUCOSE SERPL-MCNC: 359 MG/DL (ref 65–100)
HCT VFR BLD AUTO: 28.5 % (ref 36.6–50.3)
HGB BLD-MCNC: 9.3 G/DL (ref 12.1–17)
MCH RBC QN AUTO: 30.4 PG (ref 26–34)
MCHC RBC AUTO-ENTMCNC: 32.6 G/DL (ref 30–36.5)
MCV RBC AUTO: 93.1 FL (ref 80–99)
NRBC # BLD: 0 K/UL (ref 0–0.01)
NRBC BLD-RTO: 0 PER 100 WBC
PHOSPHATE SERPL-MCNC: 5.5 MG/DL (ref 2.6–4.7)
PLATELET # BLD AUTO: 224 K/UL (ref 150–400)
PMV BLD AUTO: 10.7 FL (ref 8.9–12.9)
POTASSIUM SERPL-SCNC: 5.4 MMOL/L (ref 3.5–5.1)
RBC # BLD AUTO: 3.06 M/UL (ref 4.1–5.7)
SERVICE CMNT-IMP: ABNORMAL
SODIUM SERPL-SCNC: 129 MMOL/L (ref 136–145)
WBC # BLD AUTO: 6.7 K/UL (ref 4.1–11.1)

## 2022-01-17 PROCEDURE — 74011250637 HC RX REV CODE- 250/637: Performed by: HOSPITALIST

## 2022-01-17 PROCEDURE — 74011250637 HC RX REV CODE- 250/637: Performed by: INTERNAL MEDICINE

## 2022-01-17 PROCEDURE — 74011250637 HC RX REV CODE- 250/637: Performed by: PSYCHIATRY & NEUROLOGY

## 2022-01-17 PROCEDURE — 74011636637 HC RX REV CODE- 636/637: Performed by: INTERNAL MEDICINE

## 2022-01-17 PROCEDURE — 74011250637 HC RX REV CODE- 250/637: Performed by: NURSE PRACTITIONER

## 2022-01-17 PROCEDURE — 82962 GLUCOSE BLOOD TEST: CPT

## 2022-01-17 PROCEDURE — 85027 COMPLETE CBC AUTOMATED: CPT

## 2022-01-17 PROCEDURE — 74011250637 HC RX REV CODE- 250/637: Performed by: STUDENT IN AN ORGANIZED HEALTH CARE EDUCATION/TRAINING PROGRAM

## 2022-01-17 PROCEDURE — 80069 RENAL FUNCTION PANEL: CPT

## 2022-01-17 PROCEDURE — 74011250636 HC RX REV CODE- 250/636: Performed by: INTERNAL MEDICINE

## 2022-01-17 PROCEDURE — 65660000000 HC RM CCU STEPDOWN

## 2022-01-17 RX ORDER — INSULIN GLARGINE 100 [IU]/ML
10 INJECTION, SOLUTION SUBCUTANEOUS
Status: DISCONTINUED | OUTPATIENT
Start: 2022-01-17 | End: 2022-01-18

## 2022-01-17 RX ADMIN — LEVETIRACETAM 500 MG: 500 TABLET, FILM COATED ORAL at 13:11

## 2022-01-17 RX ADMIN — Medication 4 UNITS: at 16:53

## 2022-01-17 RX ADMIN — AMLODIPINE BESYLATE 5 MG: 5 TABLET ORAL at 08:37

## 2022-01-17 RX ADMIN — LACOSAMIDE 100 MG: 50 TABLET, FILM COATED ORAL at 02:40

## 2022-01-17 RX ADMIN — Medication 4 UNITS: at 13:11

## 2022-01-17 RX ADMIN — MUPIROCIN: 20 OINTMENT TOPICAL at 08:38

## 2022-01-17 RX ADMIN — BUTALBITAL, ACETAMINOPHEN, AND CAFFEINE 1 TABLET: 50; 325; 40 TABLET ORAL at 08:40

## 2022-01-17 RX ADMIN — ACETAMINOPHEN 650 MG: 325 TABLET ORAL at 13:11

## 2022-01-17 RX ADMIN — Medication 4 UNITS: at 08:37

## 2022-01-17 RX ADMIN — SODIUM ZIRCONIUM CYCLOSILICATE 10 G: 10 POWDER, FOR SUSPENSION ORAL at 13:13

## 2022-01-17 RX ADMIN — Medication 100 MG: at 08:37

## 2022-01-17 RX ADMIN — BUTALBITAL, ACETAMINOPHEN, AND CAFFEINE 1 TABLET: 50; 325; 40 TABLET ORAL at 16:53

## 2022-01-17 RX ADMIN — LACOSAMIDE 100 MG: 50 TABLET, FILM COATED ORAL at 13:11

## 2022-01-17 RX ADMIN — Medication 8 UNITS: at 08:36

## 2022-01-17 RX ADMIN — INSULIN GLARGINE 10 UNITS: 100 INJECTION, SOLUTION SUBCUTANEOUS at 21:51

## 2022-01-17 RX ADMIN — Medication 8 UNITS: at 16:53

## 2022-01-17 RX ADMIN — TRAMADOL HYDROCHLORIDE 50 MG: 50 TABLET, COATED ORAL at 21:42

## 2022-01-17 RX ADMIN — LEVETIRACETAM 500 MG: 500 TABLET, FILM COATED ORAL at 02:40

## 2022-01-17 RX ADMIN — HEPARIN SODIUM 5000 UNITS: 5000 INJECTION INTRAVENOUS; SUBCUTANEOUS at 16:53

## 2022-01-17 RX ADMIN — HEPARIN SODIUM 5000 UNITS: 5000 INJECTION INTRAVENOUS; SUBCUTANEOUS at 02:40

## 2022-01-17 RX ADMIN — MELATONIN 3 MG: at 21:27

## 2022-01-17 RX ADMIN — Medication 1 UNITS: at 21:52

## 2022-01-17 RX ADMIN — Medication 2 UNITS: at 13:12

## 2022-01-17 NOTE — PROGRESS NOTES
Comprehensive Nutrition Assessment    Type and Reason for Visit: Reassess    Nutrition Recommendations/Plan:   · Continue CCD/75 g CHO/meal. Continue K+ restriction. Limiting K+ will also inadvertently limit some CHO rich foods like breakfast potatoes, excessive ketchup and OJ. · RD communicated with the kitchen to send Crystal Light Lemonade at lunch and dinner. · RD ordered Nepro shake po once daily with dinner meal; butter pecan. · Please document % meals and supplements consumed in flowsheet I/O's under intake. Nutrition Assessment:     1/17: Chart reviewed; RD visited with pt at bedside. Noted BG levels continue to be significantly elevated. K+ levels also went up to 5.3. Pt consumes a lot of ketchup, tomato sauce on pasta, breakfast potatoes and enjoys sliced tomato on his sandwiches. From a BG perspective, pt reports that he has been consuming regular gingerale vs diet which pt reports was provided to him on the unit. Additionally, pt states he has been consuming OJ and cranberry juice. RD reinforced the need to limit K+ rich foods and eliminate sugar sweet beverages; discussed adding Crystal Light Lemonade,    Patient Vitals for the past 168 hrs:   % Diet Eaten   01/15/22 1147 76 - 100%   01/15/22 0810 76 - 100%     1/10: Chart reviewed; med noted for acute on chronic kidney disease with HD 3 times per week. Hx of DM with elevated BG. SLP has been following with recs easy to chew diet. Pt is edentulous but reports tolerating salads at home. RD visited with pt at bedside, sitting up in chair consuming lunch. Pt c/o not receiving food items per request. Pt did receive a 2nd tray with items requested as refused items on first tray. Pt prefers soft pasta with meatballs/marinara. Leslie Mars for pt to have small amounts of items within portion control. Did discuss the need to monitor renal labs (K+, Phos) with adjustments as needed. Estimated Daily Nutrient Needs:  Energy (kcal): 1600 (BMR 1229 x 1. 3AF);  Weight Used for Energy Requirements: Current  Protein (g): 51-61 (1.0-1.2 g/kg bw); Weight Used for Protein Requirements: Current  Fluid (ml/day): 1600 ml/day; Method Used for Fluid Requirements: 1 ml/kcal    Nutrition Related Findings:  Lab\" K+ 5.4, Na+ 129; Meds: thiamine      Wounds:    None       Current Nutrition Therapies:  ADULT DIET Regular; 5 carb choices (75 gm/meal); Low Potassium (Less than 3000 mg/day); No OJ on trays please; send Crystal Light Lemonade with lunch and dinner daily;  Butter Pecan Nepro with dinner meal  ADULT ORAL NUTRITION SUPPLEMENT Dinner; Renal Supplement    Anthropometric Measures:  · Height:  5' 2\" (157.5 cm)  · Current Body Wt:  51 kg (112 lb 7 oz)   · Ideal Body Wt:  118 lbs:  95.3 %     Nutrition Diagnosis:   · Altered nutrition-related lab values related to  (current medical condition) as evidenced by  (elevated BG)    Nutrition Interventions:   Food and/or Nutrient Delivery: Continue current diet  Nutrition Education and Counseling: No recommendations at this time  Coordination of Nutrition Care: Continue to monitor while inpatient    Goals:  Maintain PO intake at least 50% of meals next 5-7 days       Nutrition Monitoring and Evaluation:   Behavioral-Environmental Outcomes: None identified  Food/Nutrient Intake Outcomes: Food and nutrient intake  Physical Signs/Symptoms Outcomes: Biochemical data,Chewing or swallowing,Weight,Skin    Discharge Planning:    Continue current diet     Electronically signed by Raymond Ortiz RD on 1/17/2022 at 4:15 PM

## 2022-01-17 NOTE — PROGRESS NOTES
End of Shift Note    Bedside shift change report given to Chay Thompson 46 (oncoming nurse) by Earnestine Cornelius, GERARDO (offgoing nurse). Report included the following information SBAR    Shift worked:  7p-7a   Shift summary and any significant changes:     none   Concerns for physician to address:  NONE   Zone phone for oncoming shift:   [phone]       Patient Information  Kacy Ax [address]  49 y.o.  12/28/2021 11:05 PM by Lito Lee MD. Anish Styles was admitted from half-way     Problem List  Patient Active Problem List    Diagnosis Date Noted    Status epilepticus ([address] 12/28/2021    Aspiration pneumonia ([address] 12/28/2021    ESRD needing dialysis ([address] 12/28/2021    AMS (altered mental status) 12/28/2021    ATN (acute tubular necrosis) ([address] 12/25/2021    Interstitial nephritis determined by biopsy 12/25/2021    Thrombocytopenia ([address] 12/25/2021    Hypertension 12/25/2021    Sepsis ([address] 12/25/2021    Acute metabolic encephalopathy 13/95/3189    Hypoglycemia 11/25/2021    EUSEBIA (acute kidney injury) ([address] 17/89/1784    Metabolic acidemia 36/30/2099    Intractable nausea and vomiting 11/25/2021    Hyperglycemia due to type 2 diabetes mellitus ([address] 05/05/2019    Hyperosmolar non-ketotic state in patient with type 2 diabetes mellitus ([address] 12/16/2018     Past Medical History:   Diagnosis Date    Asthma     Diabetes ([address]     High cholesterol     Seizure ([address]        Core Measures:  CVA: No No  CHF:No No  PNA:No No    Activity:  Activity Level: Up with Assistance  Number times ambulated in hallways past shift: 0  Number of times OOB to chair past shift: 0    Cardiac:   Cardiac Monitoring: Yes      Cardiac Rhythm:  (regular)    Access:   Current line(s): PIV and HD access     Genitourinary:   Urinary status: NO  Urinary Catheter?  No     Respiratory:   O2 Device: None (Room air)  Chronic home O2 use?: N/A  Incentive spirometer at bedside: N/A       GI:  Last Bowel Movement Date: 01/14/22  Current diet:  DIET ONE TIME MESSAGE  DIET ONE TIME MESSAGE  DIET ONE TIME MESSAGE  DIET ONE TIME MESSAGE  DIET ONE TIME MESSAGE  DIET ONE TIME MESSAGE  DIET ONE TIME MESSAGE  ADULT DIET Regular; 5 carb choices (75 gm/meal); Low Potassium (Less than 3000 mg/day)  DIET ONE TIME MESSAGE  Passing flatus: YES  Tolerating current diet: NO       Pain Management:   Patient states pain is manageable on current regimen: N/A    Skin:  Loco Score: 19  Interventions: increase time out of bed, limit briefs and internal/external urinary devices    Patient Safety:  Fall Score:  Total Score: 3  Interventions: bed/chair alarm, gripper socks and pt to call before getting OOB  High Fall Risk: Yes    DVT prophylaxis:  DVT prophylaxis Med- No  DVT prophylaxis SCD or ESVIN- No     Active Consults:  IP CONSULT TO NEUROLOGY  IP CONSULT TO NEPHROLOGY  IP CONSULT TO PSYCHIATRY    Length of Stay:  Expected LOS: 5d 9h  Actual LOS: 20  Discharge Plan: PENDING PLACEMENT    Winston Dasilva RN

## 2022-01-17 NOTE — PROGRESS NOTES
Hospitalist Progress Note    NAME: Luis Angel Rousseau   :  1965   MRN:  [id number]       Assessment / Plan:  Acute metabolic encephalopathy  Status epilepticus  History of seizure disorder  Aspiration pneumonia  back to baseline  MRI brainno acute abnormality. CT headno acute abnormality. Lumbar puncture doneCSF reveals no infectious etiology so far. CSF culture negative. CSF HSV negative   CSF meningitis panel negative. Out side hospital EEG indication triphasic slowing and focal 3hz right fronto temproal field siezure activety. EEG done here shows no seizure. No more seizure reported on the continuous EEG. Continue Keppra along with Vimpat. Seizure precaution. S/p course of Levaquin  --c/w thiamine  Consulted psychiatryrecommend Haldol as needed. Mental status appears to be at baseline  --Not hypoxic  --No rehab needs. CM working on safe discharge plan. Confirming if one of his relatives will be able to help. End-stage renal disease on dialysis  Anemia. --Garcia removed  --c/w Retacrit  --following Cr. Sill no signs of renal recovery. Arranging for outpatient hemodialysis    DM2 with episodes of hypoglycemia  -Adding back Lantus 10 HS   -add back premeal humalog + SSI prn    Migraine  Fioricet as needed. Hypertension  Hyperlipidemia  Polysubstance abuse  -c/w amlodipine      Body mass index is 22.55 kg/m². Estimated discharge date: January 10  Barriers: Renal function stabilize    Code status: Full  Prophylaxis: Lovenox  Recommended Disposition: SNF     Subjective:     Chief Complaint / Reason for Physician Visit  Follow up ESRD, DM, HTN      Objective:     VITALS:   Last 24hrs VS reviewed since prior progress note.  Most recent are:  Patient Vitals for the past 24 hrs:   Temp Pulse Resp BP SpO2   22 1054 98.5 °F (36.9 °C) 69 14 (!) 156/64 96 %   22 0832 98.2 °F (36.8 °C) 69 12 (!) 165/89 95 %   22 0241 98.3 °F (36.8 °C) 69 18 (!) 148/85 94 %   01/16/22 2036 98.5 °F (36.9 °C) 70 17 132/76 95 %   01/16/22 1537 98.6 °F (37 °C) 76 16 (!) 153/77 97 %     No intake or output data in the 24 hours ending 01/17/22 1406     I had a face to face encounter and independently examined this patient on 1/17/2022, as outlined below:  PHYSICAL EXAM:  General: WD, WN. Awake, not alert, cooperative, no acute distress    EENT:  EOMI. Anicteric sclerae. MMM  Resp:  CTA bilaterally, no wheezing or rales. No accessory muscle use  CV:  Regular  rhythm,  No edema, permcath left chest wall  GI:  Soft, Non distended, Non tender. +Bowel sounds  Neurologic:  Awake and alert, does not appear confused, normal speech,   Psych:   Good insight. Not anxious nor agitated  Skin:  No rashes. No jaundice, multiple tatoos    Reviewed most current lab test results and cultures  YES  Reviewed most current radiology test results   YES  Review and summation of old records today    NO  Reviewed patient's current orders and MAR    YES  PMH/SH reviewed - no change compared to H&P  ________________________________________________________________________  Care Plan discussed with:    Comments   Patient x    Family      RN x    Care Manager     Consultant                       x Multidiciplinary team rounds were held today with , nursing, pharmacist and clinical coordinator. Patient's plan of care was discussed; medications were reviewed and discharge planning was addressed.      ________________________________________________________________________  Total NON critical care TIME:  5  Minutes    Total CRITICAL CARE TIME Spent:   Minutes non procedure based      Comments   >50% of visit spent in counseling and coordination of care     ________________________________________________________________________  Escobar Casillas MD     Procedures: see electronic medical records for all procedures/Xrays and details which were not copied into this note but were reviewed prior to creation of Plan.      LABS:  I reviewed today's most current labs and imaging studies.   Pertinent labs include:  Recent Labs     01/17/22  0250 01/15/22  0058   WBC 6.7 6.9   HGB 9.3* 9.1*   HCT 28.5* 27.9*    273     Recent Labs     01/17/22  0250 01/15/22  0058   * 131*   K 5.4* 5.1   CL 95* 96*   CO2 27 30   * 246*   BUN 61* 37*   CREA 4.73* 3.52*   CA 8.9 8.7   PHOS 5.5* 4.7   ALB 3.0* 2.9*       Signed: Roscoe Keenan MD

## 2022-01-17 NOTE — PROGRESS NOTES
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 D/w his need for long term HD at the time bieng.  He is worried about his house and food etc. D/w Primary attending     Current Facility-Administered Medications   Medication Dose Route Frequency    sodium zirconium cyclosilicate (LOKELMA) powder packet 10 g  10 g Oral NOW    insulin lispro (HUMALOG) injection 4 Units  4 Units SubCUTAneous TIDAC    heparin (porcine) injection 5,000 Units  5,000 Units SubCUTAneous Q12H    traMADoL (ULTRAM) tablet 50 mg  50 mg Oral Q6H PRN    mupirocin (BACTROBAN) 2 % ointment   Topical DAILY    [Held by provider] insulin glargine (LANTUS) injection 15 Units  15 Units SubCUTAneous QHS    epoetin jeff-epbx (RETACRIT) injection 4,000 Units  4,000 Units SubCUTAneous Q TUE, THU & SAT    melatonin tablet 3 mg  3 mg Oral QHS    thiamine mononitrate (B-1) tablet 100 mg  100 mg Oral DAILY    butalbital-acetaminophen-caffeine (FIORICET, ESGIC) -40 mg per tablet 1 Tablet  1 Tablet Oral Q6H PRN    amLODIPine (NORVASC) tablet 5 mg  5 mg Oral DAILY    heparin (porcine) 1,000 unit/mL injection 1,700 Units  1,700 Units InterCATHeter DIALYSIS PRN    And    heparin (porcine) 1,000 unit/mL injection 1,600 Units  1,600 Units InterCATHeter DIALYSIS PRN    levETIRAcetam (KEPPRA) tablet 500 mg  500 mg Oral BID    lacosamide (VIMPAT) tablet 100 mg  100 mg Oral Q12H    hydrALAZINE (APRESOLINE) 20 mg/mL injection 10 mg  10 mg IntraVENous Q6H PRN    ondansetron (ZOFRAN ODT) tablet 4 mg  4 mg Oral Q8H PRN    Or    ondansetron (ZOFRAN) injection 4 mg  4 mg IntraVENous Q6H PRN    acetaminophen (TYLENOL) tablet 650 mg  650 mg Oral Q4H PRN    Or    acetaminophen (TYLENOL) solution 650 mg  650 mg Per NG tube Q4H PRN    Or    acetaminophen (TYLENOL) suppository 650 mg  650 mg Rectal Q4H PRN    insulin lispro (HUMALOG) injection   SubCUTAneous AC&HS    glucose chewable tablet 16 g  4 Tablet Oral PRN    dextrose (D50W) injection syrg 12.5-25 g  12.5-25 g IntraVENous PRN    glucagon (GLUCAGEN) injection 1 mg  1 mg IntraMUSCular PRN       Review of Systems  Pertinent items are noted in HPI. Objective:     Visit Vitals  BP (!) 156/64   Pulse 69   Temp 98.5 °F (36.9 °C)   Resp 14   Ht 5' 2\" (1.575 m)   Wt 55.9 kg (123 lb 4.8 oz)   SpO2 96%   BMI 22.55 kg/m²     Temp (24hrs), Av.4 °F (36.9 °C), Min:98.2 °F (36.8 °C), Max:98.6 °F (37 °C)      No intake/output data recorded.     Physical Exam:    General [    ] healthy     [x] acutely ill [x    ] chronically ill   [    ] critical      Skin  [x] Nl color/turgor [    ]   Eyes  [x] EOMI [    ]    ENT  [x] clear/moist [    ]     Neck  [x] supple  [    ]    Pulm  [x] no distress [    ]    CV  [] RRR  [    ]   ABD  [] Soft  [    ]      [] Garcia  [    ]    MS  [x] Edema  [    ]     Neuro             [x] nonfocal  [    ]    Psyche           [x] a bit anxious   [    ]       Data Review:     LABS:   Recent Results (from the past 24 hour(s))   GLUCOSE, POC    Collection Time: 22  4:41 PM   Result Value Ref Range    Glucose (POC) 137 (H) 65 - 117 mg/dL    Performed by Esdras Hutchinson (PCT)    GLUCOSE, POC    Collection Time: 22 10:02 PM   Result Value Ref Range    Glucose (POC) 422 (H) 65 - 117 mg/dL    Performed by Esha De    CBC W/O DIFF    Collection Time: 01/17/22  2:50 AM   Result Value Ref Range    WBC 6.7 4.1 - 11.1 K/uL    RBC 3.06 (L) 4.10 - 5.70 M/uL    HGB 9.3 (L) 12.1 - 17.0 g/dL    HCT 28.5 (L) 36.6 - 50.3 %    MCV 93.1 80.0 - 99.0 FL    MCH 30.4 26.0 - 34.0 PG    MCHC 32.6 30.0 - 36.5 g/dL    RDW 15.7 (H) 11.5 - 14.5 %    PLATELET 466 697 - 208 K/uL    MPV 10.7 8.9 - 12.9 FL    NRBC 0.0 0  WBC    ABSOLUTE NRBC 0.00 0.00 - 0.01 K/uL   RENAL FUNCTION PANEL    Collection Time: 01/17/22  2:50 AM   Result Value Ref Range    Sodium 129 (L) 136 - 145 mmol/L    Potassium 5.4 (H) 3.5 - 5.1 mmol/L    Chloride 95 (L) 97 - 108 mmol/L    CO2 27 21 - 32 mmol/L    Anion gap 7 5 - 15 mmol/L    Glucose 359 (H) 65 - 100 mg/dL    BUN 61 (H) 6 - 20 MG/DL    Creatinine 4.73 (H) 0.70 - 1.30 MG/DL    BUN/Creatinine ratio 13 12 - 20      GFR est AA 16 (L) >60 ml/min/1.73m2    GFR est non-AA 13 (L) >60 ml/min/1.73m2    Calcium 8.9 8.5 - 10.1 MG/DL    Phosphorus 5.5 (H) 2.6 - 4.7 MG/DL    Albumin 3.0 (L) 3.5 - 5.0 g/dL   GLUCOSE, POC    Collection Time: 01/17/22  8:01 AM   Result Value Ref Range    Glucose (POC) 498 (H) 65 - 117 mg/dL    Performed by Davina Hawthorne PCT                  Signed By: Nathalia Méndez MD, PADILLA                      January 17, 2022                      [email or web address]

## 2022-01-17 NOTE — PROGRESS NOTES
Received message from patient's nurse stating:    BS is 422         Discussion / orders:     Entered order for lispro 4 units subcutaneous injection x1             Please note that this note was dictated using Dragon computer voice recognition software. Quite often unanticipated grammatical, syntax, homophones, and other interpretive errors are inadvertently transcribed by the computer software. Please disregard these errors. Please excuse any errors that have escaped final proofreading.      Signed by:  Viveca Gowers, DNP, NANCIP-BC

## 2022-01-17 NOTE — PROGRESS NOTES
Transition of Care Plan:     RUR:  19%     Disposition: TBD   NEW ESRD:  tentative chair assignment MWF, 1st shift, [address]   Follow up appointments:  As Needed  DME needed: needs a cane   Transportation at [employer] or means to access home: N/A   IM Medicare Letter:   N/A  Is patient a BCPI-A Bundle:                      If yes, was Bundle Letter given?:    Is patient a [place] and connected with the [facility]?   N/A  If yes, was [employer] transfer form completed and [facility] notified? Caregiver Contact:  Alexandra Trejo  [phone]  Discharge Caregiver contacted prior to discharge? Yes    Chart reviewed. CM continuing to follow for discharge planning. Pt still requiring HD at d/c. CM has faxed referral to [facility] admissions today for review. ([phone]. Fax received. Spoke with admissions coordinator ([phone]; [phone]) who states that pt has tentatively been assigned a chair at [place] [id number]; [phone]) MWF, 1st shift. Clinicals have been submitted to the clinic for final review. Awaiting confirmation. CM left  for niece, Gilberto Anthony, today to check on status of d/c planning. Awaiting response. Call placed to sister, Yogi Clark, for follow up. Yogi Clark states that she has been estranged from pt for 15 years. She stated, \"I don't really deal with my family. \" Yogi Clark reports that pt has another brother in [facility] who is willing to take pt in, but she has not yet confirmed this with pt. Note that if [place] leaves the state [place], he will need to apply for out of state Medicaid ([facility]) and will not have active health insurance coverage. Met with pt at bedside to review the above. Discharge location remains unclear. Pt has voiced that he does not want to remain in the [facility]. He does not get along with his brother in [facility]. He prefers to return to [facility] which has been his home for the past 18 years.  Pt shared that he was informed his patricia is currently in a mental health institution and is not doing well. He has a friend he is trying to contact who was also living in the home, but pt cannot produce any phone numbers. Pt does not have any belongings with him in the hospital (cash, clothing, wallet, phone, etc.)     Pt very tearful today. He expressed being worried about the future and it's uncertainty. Emotional support provided. He shared about his past and his parents who are both . He shared that he has 4 adult children who he is estranged from. Pt still wanting to d/c to prior address which is patricia's home. Expressed need for back up plan if he is not allowed in the home. Unable to confirm that he is able to return there. CM will explore possible shelter/medical respite options in the [place] area. Will continue to follow.     ALESSANDRO La   Care Manager, [facility]  [phone]

## 2022-01-18 LAB
ALBUMIN SERPL-MCNC: 3.1 G/DL (ref 3.5–5)
ANION GAP SERPL CALC-SCNC: 8 MMOL/L (ref 5–15)
BUN SERPL-MCNC: 62 MG/DL (ref 6–20)
BUN/CREAT SERPL: 12 (ref 12–20)
CALCIUM SERPL-MCNC: 9 MG/DL (ref 8.5–10.1)
CHLORIDE SERPL-SCNC: 91 MMOL/L (ref 97–108)
CO2 SERPL-SCNC: 28 MMOL/L (ref 21–32)
CREAT SERPL-MCNC: 5.15 MG/DL (ref 0.7–1.3)
ERYTHROCYTE [DISTWIDTH] IN BLOOD BY AUTOMATED COUNT: 15.5 % (ref 11.5–14.5)
GLUCOSE BLD STRIP.AUTO-MCNC: 126 MG/DL (ref 65–117)
GLUCOSE BLD STRIP.AUTO-MCNC: 170 MG/DL (ref 65–117)
GLUCOSE BLD STRIP.AUTO-MCNC: 303 MG/DL (ref 65–117)
GLUCOSE BLD STRIP.AUTO-MCNC: 309 MG/DL (ref 65–117)
GLUCOSE SERPL-MCNC: 394 MG/DL (ref 65–100)
HCT VFR BLD AUTO: 30.4 % (ref 36.6–50.3)
HGB BLD-MCNC: 10 G/DL (ref 12.1–17)
MCH RBC QN AUTO: 30.7 PG (ref 26–34)
MCHC RBC AUTO-ENTMCNC: 32.9 G/DL (ref 30–36.5)
MCV RBC AUTO: 93.3 FL (ref 80–99)
NRBC # BLD: 0 K/UL (ref 0–0.01)
NRBC BLD-RTO: 0 PER 100 WBC
PHOSPHATE SERPL-MCNC: 5.6 MG/DL (ref 2.6–4.7)
PLATELET # BLD AUTO: 262 K/UL (ref 150–400)
PMV BLD AUTO: 10.2 FL (ref 8.9–12.9)
POTASSIUM SERPL-SCNC: 5.6 MMOL/L (ref 3.5–5.1)
RBC # BLD AUTO: 3.26 M/UL (ref 4.1–5.7)
SERVICE CMNT-IMP: ABNORMAL
SODIUM SERPL-SCNC: 127 MMOL/L (ref 136–145)
WBC # BLD AUTO: 5 K/UL (ref 4.1–11.1)

## 2022-01-18 PROCEDURE — 82962 GLUCOSE BLOOD TEST: CPT

## 2022-01-18 PROCEDURE — 74011250637 HC RX REV CODE- 250/637: Performed by: STUDENT IN AN ORGANIZED HEALTH CARE EDUCATION/TRAINING PROGRAM

## 2022-01-18 PROCEDURE — 85027 COMPLETE CBC AUTOMATED: CPT

## 2022-01-18 PROCEDURE — 74011636637 HC RX REV CODE- 636/637: Performed by: INTERNAL MEDICINE

## 2022-01-18 PROCEDURE — 90935 HEMODIALYSIS ONE EVALUATION: CPT

## 2022-01-18 PROCEDURE — 74011250636 HC RX REV CODE- 250/636: Performed by: INTERNAL MEDICINE

## 2022-01-18 PROCEDURE — 74011250637 HC RX REV CODE- 250/637: Performed by: NURSE PRACTITIONER

## 2022-01-18 PROCEDURE — 36415 COLL VENOUS BLD VENIPUNCTURE: CPT

## 2022-01-18 PROCEDURE — 80069 RENAL FUNCTION PANEL: CPT

## 2022-01-18 PROCEDURE — 65660000000 HC RM CCU STEPDOWN

## 2022-01-18 PROCEDURE — 74011250637 HC RX REV CODE- 250/637: Performed by: PSYCHIATRY & NEUROLOGY

## 2022-01-18 RX ORDER — INSULIN LISPRO 100 [IU]/ML
5 INJECTION, SOLUTION INTRAVENOUS; SUBCUTANEOUS
Status: DISCONTINUED | OUTPATIENT
Start: 2022-01-18 | End: 2022-01-22 | Stop reason: HOSPADM

## 2022-01-18 RX ORDER — INSULIN GLARGINE 100 [IU]/ML
15 INJECTION, SOLUTION SUBCUTANEOUS
Status: DISCONTINUED | OUTPATIENT
Start: 2022-01-18 | End: 2022-01-20

## 2022-01-18 RX ADMIN — INSULIN GLARGINE 15 UNITS: 100 INJECTION, SOLUTION SUBCUTANEOUS at 23:54

## 2022-01-18 RX ADMIN — AMLODIPINE BESYLATE 5 MG: 5 TABLET ORAL at 09:16

## 2022-01-18 RX ADMIN — HEPARIN SODIUM 1700 UNITS: 1000 INJECTION INTRAVENOUS; SUBCUTANEOUS at 12:40

## 2022-01-18 RX ADMIN — LACOSAMIDE 100 MG: 50 TABLET, FILM COATED ORAL at 13:59

## 2022-01-18 RX ADMIN — HEPARIN SODIUM 5000 UNITS: 5000 INJECTION INTRAVENOUS; SUBCUTANEOUS at 17:30

## 2022-01-18 RX ADMIN — LEVETIRACETAM 500 MG: 500 TABLET, FILM COATED ORAL at 13:59

## 2022-01-18 RX ADMIN — MUPIROCIN: 20 OINTMENT TOPICAL at 09:00

## 2022-01-18 RX ADMIN — LACOSAMIDE 100 MG: 50 TABLET, FILM COATED ORAL at 02:26

## 2022-01-18 RX ADMIN — LEVETIRACETAM 500 MG: 500 TABLET, FILM COATED ORAL at 02:26

## 2022-01-18 RX ADMIN — TRAMADOL HYDROCHLORIDE 50 MG: 50 TABLET, COATED ORAL at 20:22

## 2022-01-18 RX ADMIN — MELATONIN 3 MG: at 21:57

## 2022-01-18 RX ADMIN — Medication 5 UNITS: at 17:31

## 2022-01-18 RX ADMIN — HEPARIN SODIUM 1600 UNITS: 1000 INJECTION INTRAVENOUS; SUBCUTANEOUS at 12:40

## 2022-01-18 RX ADMIN — Medication 5 UNITS: at 13:59

## 2022-01-18 RX ADMIN — HEPARIN SODIUM 5000 UNITS: 5000 INJECTION INTRAVENOUS; SUBCUTANEOUS at 02:26

## 2022-01-18 RX ADMIN — Medication 4 UNITS: at 17:30

## 2022-01-18 RX ADMIN — Medication 100 MG: at 09:00

## 2022-01-18 RX ADMIN — TRAMADOL HYDROCHLORIDE 50 MG: 50 TABLET, COATED ORAL at 09:16

## 2022-01-18 NOTE — PROCEDURES
Hemodialysis / [phone]    Vitals Pre Post Assessment Pre Post   BP BP: (!) 139/90 (01/18/22 0937) 150/104 LOC A&Ox4 A&Ox4   HR Pulse (Heart Rate): 62 (01/18/22 0937) 68 Lungs clear clear   Resp Resp Rate: 16 (01/18/22 0937) 16 Cardiac Reg S1 S2 Reg S1 S2   Temp Temp: 97.7 °F (36.5 °C) (01/18/22 0937) 97.5, oral Skin Healing boil on buttock; CDI Healing boil on buttock; CDI   Weight  n/a n/a Edema none none   Tele status none none Pain 0 0     Orders   Duration: Start: 4868 End: 8520 Total: 3hr   Dialyzer: Dialyzer/Set Up Inspection: Revaclear (01/18/22 0937)   Cayla Polio Bath: Dialysate K (mEq/L): 2 (01/18/22 0937)   Ca Bath: Dialysate CA (mEq/L): 2.5 (01/18/22 0937)   Na: Dialysate NA (mEq/L): 138 (01/18/22 0937)   Bicarb: Dialysate HCO3 (mEq/L): 35 (01/18/22 0937)   Target Fluid Removal: Goal/Amount of Fluid to Remove (mL): 0 mL (01/18/22 0937)     Access   Type & Location: [facility]   Comments:  Dressing intact; dressing changed and dated for today. No s/s of infection. Both lumens aspirate & flush well. Running well at .                                       Labs   HBsAg (Antigen) / date: Neg Ag 12/25/21                                              HBsAb (Antibody) / date: <10 Ab 12/25/21   Source: EPIC   Obtained/Reviewed  Critical Results Called HGB   Date Value Ref Range Status   01/18/2022 10.0 (L) 12.1 - 17.0 g/dL Final     Potassium   Date Value Ref Range Status   01/17/2022 5.4 (H) 3.5 - 5.1 mmol/L Final     Calcium   Date Value Ref Range Status   01/17/2022 8.9 8.5 - 10.1 MG/DL Final     BUN   Date Value Ref Range Status   01/17/2022 61 (H) 6 - 20 MG/DL Final     Comment:     INVESTIGATED PER DELTA CHECK PROTOCOL     Creatinine   Date Value Ref Range Status   01/17/2022 4.73 (H) 0.70 - 1.30 MG/DL Final     Comment:     INVESTIGATED PER DELTA CHECK PROTOCOL        Meds Given   Name Dose Route   Heparin 1:1000 1.6ml & 1.7ml Dwell in CVC after HD               Adequacy / Fluid    Total Liters Process: 61.5L   Net Fluid Removed: 0ml      Comments   Time Out Done:   (Time) 1170   Admitting Diagnosis: EUSEBIA   Consent obtained/signed: Informed Consent Verified: Yes (01/18/22 0659)   Machine / RO # Machine Number: [id number] (01/18/22 9139)   Primary Nurse Rpt Pre: Rena Lockett RN   Primary Nurse Rpt Post: Rena Lockett RN   Pt Education: EUSEBIA vs ESRD; procedural   Care Plan: ongoing   Pts outpatient clinic: n/a     Tx Summary   Comments:       SBAR received from Primary RN. Pt arrived to HD suite A&Ox4. Consent signed & on file. 6571: Each catheter limb disinfected per p&p, caps removed, hubs disinfected per p&p. Each lumen aspirated for blood return and flushed with Normal Saline per policy. Labs drawn per request/ order. VSS. Dialysis Tx initiated. * no issues*  1237: Tx ended. VSS. Each dialysis catheter limb disinfected per p&p, all possible blood returned per p&p, and each dialysis hub disinfected per p&p. Each lumen flushed, post dialysis catheter Heparin dwell instilled per order, and caps applied. Bed locked and in the lowest position, call bell and belongings in reach. SBAR given to Primary, RN. Patient is stable at time of their departure. All Dialysis related medications have been reviewed.

## 2022-01-18 NOTE — PROGRESS NOTES
Transition of Care Plan:     RUR:  19%     Disposition: TBD   NEW ESRD:  confirmed chair T/TH/SAT 12PM, Melanie Grimes   Follow up appointments:  As Needed  DME needed: needs a cane   Transportation at [employer] or means to access home: N/A   IM Medicare Letter:   N/A  Is patient a BCPI-A Bundle:                      If yes, was Bundle Letter given?:    Is patient a [place] and connected with the [facility]?   N/A  If yes, was [employer] transfer form completed and VA notified? Caregiver Contact:  Alexandra Hernandez  [phone]  Discharge Caregiver contacted prior to discharge? Yes    Chart reviewed. CM received call this AM from [facility] with Dianne Hwang4. ([phone]) who is assisting with pt's case. Vishal Pemberton able to provide name and contact information for Kayla Tompkins. ([phone]). Met with pt at bedside to obtain verbal permission to call her. Pt agreeable. Number provided is not in service. Dianne Alvarenga [address] Dialysis chair assignment has been confirmed. Pt assigned to [facility] with Tuesday, Thursday, Saturday schedule, 12PM chair time. Pt slated for first admission day of 1/25 (Tuesday). He must arrive at 11:30AM on first day. He will need to utilize Medicaid transportation. Still working to secure safe disposition. Still no response from pt's niece. Will continue to follow.     ALESSANDRO Daly   Care Manager, [facility]  [phone]

## 2022-01-18 NOTE — PROGRESS NOTES
Nephrology Progress Note  Dorian Antonio     www. 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  Consider [facility] if possible   Next HD will be Thursday   Watch for renal recovery   Continue norvasc   epogen for anemia   If cr worse - he will need hd saturday         Subjective: Interval History:   Seen and examined today on hemodialysis  Urine output better but very poor clearance      Objective:   Vitals:    01/18/22 1145 01/18/22 1200 01/18/22 1215 01/18/22 1237   BP: (!) 150/103 (!) 154/103 (!) 151/100 (!) 150/104   Pulse: 68 67 72 68   Resp: 16 16 16 16   Temp:    97.5 °F (36.4 °C)   TempSrc:    Oral   SpO2:       Weight:       Height:          01/17 0701 - 01/18 0700  In: -   Out: 1800 [Urine:1800]  Last 3 Recorded Weights in this Encounter    01/06/22 2249 01/08/22 0645 01/10/22 0621   Weight: 53.3 kg (117 lb 9.6 oz) 53.6 kg (118 lb 1.6 oz) 55.9 kg (123 lb 4.8 oz)      Physical exam:   GEN:nad  NECK:  Supple, no thyromegaly  RESP: clear b/l, no  wheezing,   CVS: RRR,S1,S2   NEURO: non focal,  normal speech  EXT:no Edema +nt     Right permcath +      Chart reviewed. Pertinent Notes reviewed.      Data Review :  Recent Labs     01/18/22  0940 01/17/22  0250   * 129*   K 5.6* 5.4*   CL 91* 95*   CO2 28 27   BUN 62* 61*   CREA 5.15* 4.73*   * 359*   CA 9.0 8.9   PHOS 5.6* 5.5*     Recent Labs     01/18/22  0940 01/17/22  0250   WBC 5.0 6.7   HGB 10.0* 9.3*   HCT 30.4* 28.5*    224     No results for input(s): FE, TIBC, PSAT, FERR in the last 72 hours.    Medication list  reviewed  Current Facility-Administered Medications   Medication Dose Route Frequency    insulin lispro (HUMALOG) injection 5 Units  5 Units SubCUTAneous TIDAC    insulin glargine (LANTUS) injection 15 Units  15 Units SubCUTAneous QHS    heparin (porcine) injection 5,000 Units  5,000 Units SubCUTAneous Q12H    traMADoL (ULTRAM) tablet 50 mg  50 mg Oral Q6H PRN    mupirocin (BACTROBAN) 2 % ointment   Topical DAILY    epoetin jeff-epbx (RETACRIT) injection 4,000 Units  4,000 Units SubCUTAneous Q TUE, THU & SAT    melatonin tablet 3 mg  3 mg Oral QHS    thiamine mononitrate (B-1) tablet 100 mg  100 mg Oral DAILY    butalbital-acetaminophen-caffeine (FIORICET, ESGIC) -40 mg per tablet 1 Tablet  1 Tablet Oral Q6H PRN    amLODIPine (NORVASC) tablet 5 mg  5 mg Oral DAILY    heparin (porcine) 1,000 unit/mL injection 1,700 Units  1,700 Units InterCATHeter DIALYSIS PRN    And    heparin (porcine) 1,000 unit/mL injection 1,600 Units  1,600 Units InterCATHeter DIALYSIS PRN    levETIRAcetam (KEPPRA) tablet 500 mg  500 mg Oral BID    lacosamide (VIMPAT) tablet 100 mg  100 mg Oral Q12H    hydrALAZINE (APRESOLINE) 20 mg/mL injection 10 mg  10 mg IntraVENous Q6H PRN    ondansetron (ZOFRAN ODT) tablet 4 mg  4 mg Oral Q8H PRN    Or    ondansetron (ZOFRAN) injection 4 mg  4 mg IntraVENous Q6H PRN    acetaminophen (TYLENOL) tablet 650 mg  650 mg Oral Q4H PRN    Or    acetaminophen (TYLENOL) solution 650 mg  650 mg Per NG tube Q4H PRN    Or    acetaminophen (TYLENOL) suppository 650 mg  650 mg Rectal Q4H PRN    insulin lispro (HUMALOG) injection   SubCUTAneous AC&HS    glucose chewable tablet 16 g  4 Tablet Oral PRN    dextrose (D50W) injection syrg 12.5-25 g  12.5-25 g IntraVENous PRN    glucagon (GLUCAGEN) injection 1 mg  1 mg IntraMUSCular PRN          Ervin Downs MD [facility]  [facility] [place] [facility]  [address], [address]  [address] [facility] [address], [address]  Phone - [phone]               Fax - [phone]

## 2022-01-18 NOTE — PROGRESS NOTES
Verbal shift change report given to Eli Eli RN (oncoming nurse) by Barak Uriostegui RN (offgoing nurse). Report included the following information SBAR, Kardex, Intake/Output, MAR and Cardiac Rhythm NSR.

## 2022-01-19 LAB
GLUCOSE BLD STRIP.AUTO-MCNC: 142 MG/DL (ref 65–117)
GLUCOSE BLD STRIP.AUTO-MCNC: 165 MG/DL (ref 65–117)
GLUCOSE BLD STRIP.AUTO-MCNC: 346 MG/DL (ref 65–117)
GLUCOSE BLD STRIP.AUTO-MCNC: 403 MG/DL (ref 65–117)
SERVICE CMNT-IMP: ABNORMAL

## 2022-01-19 PROCEDURE — 82962 GLUCOSE BLOOD TEST: CPT

## 2022-01-19 PROCEDURE — 74011636637 HC RX REV CODE- 636/637: Performed by: INTERNAL MEDICINE

## 2022-01-19 PROCEDURE — 65660000000 HC RM CCU STEPDOWN

## 2022-01-19 PROCEDURE — 74011250637 HC RX REV CODE- 250/637: Performed by: NURSE PRACTITIONER

## 2022-01-19 PROCEDURE — 74011250637 HC RX REV CODE- 250/637: Performed by: STUDENT IN AN ORGANIZED HEALTH CARE EDUCATION/TRAINING PROGRAM

## 2022-01-19 PROCEDURE — 74011250636 HC RX REV CODE- 250/636: Performed by: INTERNAL MEDICINE

## 2022-01-19 PROCEDURE — 74011636637 HC RX REV CODE- 636/637: Performed by: NURSE PRACTITIONER

## 2022-01-19 PROCEDURE — 74011250637 HC RX REV CODE- 250/637: Performed by: PSYCHIATRY & NEUROLOGY

## 2022-01-19 RX ORDER — INSULIN LISPRO 100 [IU]/ML
4 INJECTION, SOLUTION INTRAVENOUS; SUBCUTANEOUS ONCE
Status: COMPLETED | OUTPATIENT
Start: 2022-01-20 | End: 2022-01-19

## 2022-01-19 RX ADMIN — LACOSAMIDE 100 MG: 50 TABLET, FILM COATED ORAL at 02:14

## 2022-01-19 RX ADMIN — Medication 100 MG: at 10:41

## 2022-01-19 RX ADMIN — LEVETIRACETAM 500 MG: 500 TABLET, FILM COATED ORAL at 02:14

## 2022-01-19 RX ADMIN — HEPARIN SODIUM 5000 UNITS: 5000 INJECTION INTRAVENOUS; SUBCUTANEOUS at 16:10

## 2022-01-19 RX ADMIN — Medication 5 UNITS: at 10:41

## 2022-01-19 RX ADMIN — HEPARIN SODIUM 5000 UNITS: 5000 INJECTION INTRAVENOUS; SUBCUTANEOUS at 02:14

## 2022-01-19 RX ADMIN — Medication 4 UNITS: at 18:13

## 2022-01-19 RX ADMIN — AMLODIPINE BESYLATE 5 MG: 5 TABLET ORAL at 10:41

## 2022-01-19 RX ADMIN — INSULIN GLARGINE 15 UNITS: 100 INJECTION, SOLUTION SUBCUTANEOUS at 23:35

## 2022-01-19 RX ADMIN — MELATONIN 3 MG: at 21:43

## 2022-01-19 RX ADMIN — Medication 4 UNITS: at 23:36

## 2022-01-19 RX ADMIN — TRAMADOL HYDROCHLORIDE 50 MG: 50 TABLET, COATED ORAL at 16:28

## 2022-01-19 RX ADMIN — LEVETIRACETAM 500 MG: 500 TABLET, FILM COATED ORAL at 16:10

## 2022-01-19 RX ADMIN — Medication 5 UNITS: at 18:12

## 2022-01-19 RX ADMIN — MUPIROCIN: 20 OINTMENT TOPICAL at 09:00

## 2022-01-19 RX ADMIN — LACOSAMIDE 100 MG: 50 TABLET, FILM COATED ORAL at 16:10

## 2022-01-19 NOTE — PROGRESS NOTES
End of Shift Note    Bedside shift change report given to  [facility] (oncoming nurse) by Cesar Espinoza RN (offgoing nurse). Report included the following information SBAR    Shift worked: nights   Shift summary and any significant changes:    Tramadol given for back pain. Pt glucose stable, no coverage needed. Concerns for physician to address: None    Zone phone for oncoming shift:          Patient Information  Lilia Dickinson [address]  49 y.o.  12/28/2021 11:05 PM by Oumou Mills MD. Georgette Fernandez was admitted from [facility]     Problem List  Patient Active Problem List    Diagnosis Date Noted    Status epilepticus ([address] 12/28/2021    Aspiration pneumonia ([address] 12/28/2021    ESRD needing dialysis ([address] 12/28/2021    AMS (altered mental status) 12/28/2021    ATN (acute tubular necrosis) ([address] 12/25/2021    Interstitial nephritis determined by biopsy 12/25/2021    Thrombocytopenia ([address] 12/25/2021    Hypertension 12/25/2021    Sepsis ([address] 12/25/2021    Acute metabolic encephalopathy 79/18/8560    Hypoglycemia 11/25/2021    EUSEBIA (acute kidney injury) ([address] 39/86/4695    Metabolic acidemia 67/71/9846    Intractable nausea and vomiting 11/25/2021    Hyperglycemia due to type 2 diabetes mellitus ([address] 05/05/2019    Hyperosmolar non-ketotic state in patient with type 2 diabetes mellitus ([address] 12/16/2018     Past Medical History:   Diagnosis Date    Asthma     Diabetes ([address]     High cholesterol     Seizure ([address]        Core Measures:  CVA: No No  CHF:No No  PNA:No No    Activity:  Activity Level: Up with Assistance  Number times ambulated in hallways past shift: 0  Number of times OOB to chair past shift: 0    Cardiac:   Cardiac Monitoring: Yes      Cardiac Rhythm:  (regular)    Access:   Current line(s): HD access    Genitourinary:   Urinary status: NO  Urinary Catheter?  No     Respiratory:   O2 Device: None (Room air)  Chronic home O2 use?: N/A  Incentive spirometer at bedside: N/A       GI:  Last Bowel Movement Date: 01/14/22  Current diet:  ADULT DIET Regular; 5 carb choices (75 gm/meal); Low Potassium (Less than 3000 mg/day); No OJ on trays please; send Crystal Light Lemonade with lunch and dinner daily; Butter Pecan Nepro with dinner meal  ADULT ORAL NUTRITION SUPPLEMENT Dinner; Renal Supplement  Passing flatus: YES  Tolerating current diet: NO       Pain Management:   Patient states pain is manageable on current regimen: Yes    Skin:  Loco Score: 22  Interventions: increase time out of bed, limit briefs and internal/external urinary devices    Patient Safety:  Fall Score:  Total Score: 2  Interventions: bed/chair alarm, gripper socks and pt to call before getting OOB  High Fall Risk: Yes    DVT prophylaxis:  DVT prophylaxis Med- Heparin  DVT prophylaxis SCD or ESVIN- No     Active Consults:  IP CONSULT TO NEUROLOGY  IP CONSULT TO NEPHROLOGY  IP CONSULT TO PSYCHIATRY    Length of Stay:  Expected LOS: 5d 9h  Actual LOS: 22  Discharge Plan: Possible dc to [facility]'s house pending MALIA Montejo RN

## 2022-01-19 NOTE — PROGRESS NOTES
Nephrology Progress Note  Dorian Antonio     www. [facility]  Phone - [phone]   Patient: Carlos Fleming    YOB: 1965        Date- 1/19/2022   Admit Date: 12/28/2021  CC: Follow up for eusebia  IMPRESSION & PLAN:    EUSEBIA  ON HD- AIN - on dialysis  with [facility] Leahy 1945 nephrology, will keep him on TTS schedule   Interstitial nephritis determined by biopsy NSAID induced   Hypertension   Anemia   ACUTE Metabolic encephalopathy   Hx of seizures    PLAN-   Plan for hemodialysis tomorrow and keep him on TTS schedule   [facility] for the outpatient chair on TTS shift.  Next HD will be on Thursday   He already has PermCath   Watch for renal recovery   Continue norvasc   epogen for anemia   Discussed with internal medicine team     Subjective: Interval History:   Seen and examined today   Urine output better but very poor clearance      Objective:   Vitals:    01/18/22 2310 01/19/22 0325 01/19/22 0754 01/19/22 1115   BP: 135/79 (!) 151/82 (!) 154/98 (!) 150/91   Pulse: 62 61 81 68   Resp: 16 17 16 16   Temp: 97.9 °F (36.6 °C) 98.5 °F (36.9 °C) 98.4 °F (36.9 °C) 98.2 °F (36.8 °C)   TempSrc:       SpO2: 96% 96% 96% 97%   Weight:       Height:          01/18 0701 - 01/19 0700  In: -   Out: 2703 [Urine:2430]  Last 3 Recorded Weights in this Encounter    01/06/22 2249 01/08/22 0645 01/10/22 0621   Weight: 53.3 kg (117 lb 9.6 oz) 53.6 kg (118 lb 1.6 oz) 55.9 kg (123 lb 4.8 oz)      Physical exam:   GEN:nad  NECK:  Supple, no thyromegaly  RESP: clear b/l, no  wheezing,   CVS: RRR,S1,S2   NEURO: non focal,  normal speech  EXT:no Edema +nt     Right permcath +      Chart reviewed. Pertinent Notes reviewed.      Data Review :  Recent Labs     01/18/22  0940 01/17/22  0250   * 129*   K 5.6* 5.4*   CL 91* 95*   CO2 28 27   BUN 62* 61*   CREA 5.15* 4.73*   * 359*   CA 9.0 8.9   PHOS 5.6* 5.5*     Recent Labs 01/18/22  0940 01/17/22  0250   WBC 5.0 6.7   HGB 10.0* 9.3*   HCT 30.4* 28.5*    224     No results for input(s): FE, TIBC, PSAT, FERR in the last 72 hours.    Medication list  reviewed  Current Facility-Administered Medications   Medication Dose Route Frequency    insulin lispro (HUMALOG) injection 5 Units  5 Units SubCUTAneous TIDAC    insulin glargine (LANTUS) injection 15 Units  15 Units SubCUTAneous QHS    [START ON 1/20/2022] epoetin jeff-epbx (RETACRIT) injection 4,000 Units  4,000 Units SubCUTAneous Q TUE, THU & SAT    heparin (porcine) injection 5,000 Units  5,000 Units SubCUTAneous Q12H    traMADoL (ULTRAM) tablet 50 mg  50 mg Oral Q6H PRN    mupirocin (BACTROBAN) 2 % ointment   Topical DAILY    melatonin tablet 3 mg  3 mg Oral QHS    thiamine mononitrate (B-1) tablet 100 mg  100 mg Oral DAILY    butalbital-acetaminophen-caffeine (FIORICET, ESGIC) -40 mg per tablet 1 Tablet  1 Tablet Oral Q6H PRN    amLODIPine (NORVASC) tablet 5 mg  5 mg Oral DAILY    heparin (porcine) 1,000 unit/mL injection 1,700 Units  1,700 Units InterCATHeter DIALYSIS PRN    And    heparin (porcine) 1,000 unit/mL injection 1,600 Units  1,600 Units InterCATHeter DIALYSIS PRN    levETIRAcetam (KEPPRA) tablet 500 mg  500 mg Oral BID    lacosamide (VIMPAT) tablet 100 mg  100 mg Oral Q12H    hydrALAZINE (APRESOLINE) 20 mg/mL injection 10 mg  10 mg IntraVENous Q6H PRN    ondansetron (ZOFRAN ODT) tablet 4 mg  4 mg Oral Q8H PRN    Or    ondansetron (ZOFRAN) injection 4 mg  4 mg IntraVENous Q6H PRN    acetaminophen (TYLENOL) tablet 650 mg  650 mg Oral Q4H PRN    Or    acetaminophen (TYLENOL) solution 650 mg  650 mg Per NG tube Q4H PRN    Or    acetaminophen (TYLENOL) suppository 650 mg  650 mg Rectal Q4H PRN    insulin lispro (HUMALOG) injection   SubCUTAneous AC&HS    glucose chewable tablet 16 g  4 Tablet Oral PRN    dextrose (D50W) injection syrg 12.5-25 g  12.5-25 g IntraVENous PRN    glucagon [facility]) injection 1 mg  1 mg IntraMUSCular PRN          Stewart Cleveland, [address] [facility]  [facility] / [facility]  [address], [address]  [address] [facility] [address], [address]  Phone - [phone]               Fax - [phone]

## 2022-01-19 NOTE — PROGRESS NOTES
End of Shift Note    Bedside shift change report given to Don Perry, RN (oncoming nurse) by Kalpana Zafar RN (offgoing nurse). Report included the following information SBAR    Shift worked: days   Shift summary and any significant changes:    Pt is having chronic back pain. May benefit from scheduled lidocaine patch. Patient given pain medication at 4:30pm.   Concerns for physician to address: See above please   Zone phone for oncoming shift:   [phone]       Patient Information  Odilon Marcano [address]  49 y.o.  12/28/2021 11:05 PM by Reymundo Frost MD. Becka Duncan was admitted from group home     Problem List  Patient Active Problem List    Diagnosis Date Noted    Status epilepticus ([address] 12/28/2021    Aspiration pneumonia ([address] 12/28/2021    ESRD needing dialysis ([address] 12/28/2021    AMS (altered mental status) 12/28/2021    ATN (acute tubular necrosis) ([address] 12/25/2021    Interstitial nephritis determined by biopsy 12/25/2021    Thrombocytopenia ([address] 12/25/2021    Hypertension 12/25/2021    Sepsis ([address] 12/25/2021    Acute metabolic encephalopathy 26/14/0492    Hypoglycemia 11/25/2021    EUSEBIA (acute kidney injury) ([address] 72/28/5192    Metabolic acidemia 21/60/7687    Intractable nausea and vomiting 11/25/2021    Hyperglycemia due to type 2 diabetes mellitus ([address] 05/05/2019    Hyperosmolar non-ketotic state in patient with type 2 diabetes mellitus ([address] 12/16/2018     Past Medical History:   Diagnosis Date    Asthma     Diabetes ([address]     High cholesterol     Seizure ([address]        Core Measures:  CVA: No No  CHF:No No  PNA:No No    Activity:  Activity Level: Up with Assistance  Number times ambulated in hallways past shift: 0  Number of times OOB to chair past shift: 1    Cardiac:   Cardiac Monitoring: no  Cardiac Rhythm:  (regular)    Access:   Current line(s): HD access    Genitourinary:   Urinary status: NO  Urinary Catheter?  No     Respiratory:   O2 Device: None (Room air)  Chronic home O2 use?: N/A  Incentive spirometer at bedside: N/A       GI:  Last Bowel Movement Date: 01/16/22  Current diet:  ADULT DIET Regular; 5 carb choices (75 gm/meal); Low Potassium (Less than 3000 mg/day); No OJ on trays please; send Crystal Light Lemonade with lunch and dinner daily; Butter Pecan Nepro with dinner meal  ADULT ORAL NUTRITION SUPPLEMENT Dinner; Renal Supplement  DIET ONE TIME MESSAGE  Passing flatus: YES  Tolerating current diet: NO       Pain Management:   Patient states pain is manageable on current regimen: Yes    Skin:  Loco Score: 22  Interventions: increase time out of bed, limit briefs and internal/external urinary devices    Patient Safety:  Fall Score:  Total Score: 2  Interventions: bed/chair alarm, gripper socks and pt to call before getting OOB  High Fall Risk: Yes    DVT prophylaxis:  DVT prophylaxis Med- Heparin  DVT prophylaxis SCD or ESVIN- No     Active Consults:  IP CONSULT TO NEUROLOGY  IP CONSULT TO NEPHROLOGY  IP CONSULT TO PSYCHIATRY    Length of Stay:  Expected LOS: 5d 9h  Actual LOS: 22  Discharge Plan: Pt will dc to home on Saturday after HD    Stella Silver, RN

## 2022-01-19 NOTE — PROGRESS NOTES
Transition of Care Plan:     RUR:  19%     Disposition: return to prior address   NEW ESRD:  confirmed chair T/TH/SAT 12PM, Melanie Grimes   Follow up appointments: PCP, Nephrology   DME needed: needs a cane   Transportation at [employer] or means to access home: N/A   IM Medicare Letter:   N/A  Is patient a BCPI-A Bundle:                      If yes, was Bundle Letter given?:    Is patient a  and connected with the VA?   N/A  If yes, was [employer] transfer form completed and VA notified? Caregiver Contact:  Alexandra Cummings Antonieta  [phone]  Discharge Caregiver contacted prior to discharge? Yes    Chart reviewed. [facility] Dialysis chair confirmed. Start date at clinic is Tuesday, 1/25. Pt must arrive by 11:30AM on first day. CM will assist in setting up Medicaid transportation for first week of dialysis. Director of Care Management contacted non-emergency number in [place], [place]. Wellness check completed at residence on file that pt wants to return to. Residence deemed livable and roommates are currently living there. Discharge plan deemed safe at this time. Pt will need to remain inpatient until Saturday, 1/22, to continue HD. Pt can discharge Saturday via Medicaid Transportation after HD. Will request first shift HD. Updated pt at bedside. Will continue to follow.     ALESSANDRO Nixon   Care Manager, [address]  [phone]

## 2022-01-19 NOTE — PROGRESS NOTES
Pharmacy - EPO Dosing & Monitoring    Dose and schedule: 4000 units sq q TU-Th-Sat    Labs:  Recent Labs     01/18/22  0940 01/17/22  0250   HGB 10.0* 9.3*       Impression/Plan:    Dose held today for hemoglobin= 10 or greater     Thanks,  SHANI Hutchison [facility]

## 2022-01-19 NOTE — PROGRESS NOTES
End of Shift Note    Bedside shift change report given to Efren Killian RN (oncoming nurse) by Carter Tee RN (offgoing nurse). Report included the following information SBAR    Shift worked: days   Shift summary and any significant changes:    Pt is having chronic back pain. May benefit from scheduled lidocaine patch. Concerns for physician to address: See above please   Zone phone for oncoming shift:   [phone]       Patient Information  Lilia Dickinson [address]  49 y.o.  12/28/2021 11:05 PM by Oumou Mills MD. Georgette Fernandez was admitted from detention     Problem List  Patient Active Problem List    Diagnosis Date Noted    Status epilepticus ([address] 12/28/2021    Aspiration pneumonia ([address] 12/28/2021    ESRD needing dialysis ([address] 12/28/2021    AMS (altered mental status) 12/28/2021    ATN (acute tubular necrosis) ([address] 12/25/2021    Interstitial nephritis determined by biopsy 12/25/2021    Thrombocytopenia ([address] 12/25/2021    Hypertension 12/25/2021    Sepsis ([address] 12/25/2021    Acute metabolic encephalopathy 25/23/7198    Hypoglycemia 11/25/2021    EUSEBIA (acute kidney injury) ([address] 56/27/1018    Metabolic acidemia 27/15/1980    Intractable nausea and vomiting 11/25/2021    Hyperglycemia due to type 2 diabetes mellitus ([address] 05/05/2019    Hyperosmolar non-ketotic state in patient with type 2 diabetes mellitus ([address] 12/16/2018     Past Medical History:   Diagnosis Date    Asthma     Diabetes ([address]     High cholesterol     Seizure ([address]        Core Measures:  CVA: No No  CHF:No No  PNA:No No    Activity:  Activity Level: Up with Assistance  Number times ambulated in hallways past shift: 0  Number of times OOB to chair past shift: 0    Cardiac:   Cardiac Monitoring: Yes      Cardiac Rhythm:  (regular)    Access:   Current line(s): HD access    Genitourinary:   Urinary status: NO  Urinary Catheter?  No     Respiratory:   O2 Device: None (Room air)  Chronic home O2 use?: N/A  Incentive spirometer at bedside: N/A       GI:  Last Bowel Movement Date: 01/14/22  Current diet:  ADULT DIET Regular; 5 carb choices (75 gm/meal); Low Potassium (Less than 3000 mg/day); No OJ on trays please; send Crystal Light Lemonade with lunch and dinner daily; Butter Pecan Nepro with dinner meal  ADULT ORAL NUTRITION SUPPLEMENT Dinner; Renal Supplement  Passing flatus: YES  Tolerating current diet: NO       Pain Management:   Patient states pain is manageable on current regimen: Yes    Skin:  Loco Score: 22  Interventions: increase time out of bed, limit briefs and internal/external urinary devices    Patient Safety:  Fall Score:  Total Score: 2  Interventions: bed/chair alarm, gripper socks and pt to call before getting OOB  High Fall Risk: Yes    DVT prophylaxis:  DVT prophylaxis Med- Heparin  DVT prophylaxis SCD or ESVIN- No     Active Consults:  IP CONSULT TO NEUROLOGY  IP CONSULT TO NEPHROLOGY  IP CONSULT TO PSYCHIATRY    Length of Stay:  Expected LOS: 5d 9h  Actual LOS: 21  Discharge Plan: Possible dc to [facility]'s [place] pending MALIA Tobar RN

## 2022-01-19 NOTE — PROGRESS NOTES
Hospitalist Progress Note    NAME: Jj Post   :  1965   MRN:  [id number]       Assessment / Plan:  Acute metabolic encephalopathy  Status epilepticus  History of seizure disorder  Aspiration pneumonia  back to baseline  MRI brainno acute abnormality. CT headno acute abnormality. Lumbar puncture doneCSF reveals no infectious etiology so far. CSF culture negative. CSF HSV negative   CSF meningitis panel negative. Out side hospital EEG indication triphasic slowing and focal 3hz right fronto temproal field siezure activety. EEG done here shows no seizure. No more seizure reported on the continuous EEG. Continue Keppra along with Vimpat. S/p course of Levaquin  Consulted psychiatryrecommend Haldol as needed. Mental status appears to be at baseline  --Not hypoxic  --No rehab needs. CM still working on safe discharge plan. End-stage renal disease on dialysis  Anemia. --Garcia removed  --c/w Retacrit  --still no signs of renal recovery. Arranging for outpatient hemodialysis    DM2 with episodes of hypoglycemia  -Increase Lantus to 15 units. Increased Premeal NovoLog 5 units   -BG better, continue to follow    Migraine  Fioricet as needed. Hypertension  Hyperlipidemia  Polysubstance abuse  -c/w amlodipine      Body mass index is 22.55 kg/m². Estimated discharge date: January 10  Barriers: Renal function stabilize    Code status: Full  Prophylaxis: Lovenox  Recommended Disposition: SNF     Subjective:     Chief Complaint / Reason for Physician Visit  Follow up ESRD, DM, HTN      Objective:     VITALS:   Last 24hrs VS reviewed since prior progress note.  Most recent are:  Patient Vitals for the past 24 hrs:   Temp Pulse Resp BP SpO2   22 0754 98.4 °F (36.9 °C) 81 16 (!) 154/98 96 %   22 0325 98.5 °F (36.9 °C) 61 17 (!) 151/82 96 %   22 2310 97.9 °F (36.6 °C) 62 16 135/79 96 %   22 2052 98 °F (36.7 °C) 69 15 120/82    22 1501 97.8 °F (36.6 °C) 67 16 135/67    01/18/22 1400 97.4 °F (36.3 °C) 69 16 (!) 145/65    01/18/22 1237 97.5 °F (36.4 °C) 68 16 (!) 150/104    01/18/22 1215  72 16 (!) 151/100    01/18/22 1200  67 16 (!) 154/103    01/18/22 1145  68 16 (!) 150/103    01/18/22 1130  70 16 (!) 146/104    01/18/22 1115  69 16 (!) 146/101    01/18/22 1100  79 16 (!) 143/101    01/18/22 1045  70 16 (!) 160/101    01/18/22 1030  67 16 (!) 148/95    01/18/22 1015  67 16 (!) 150/99    01/18/22 1000  65 16 (!) 141/94    01/18/22 0945  62 16 (!) 147/93    01/18/22 0937 97.7 °F (36.5 °C) 62 16 (!) 139/90        Intake/Output Summary (Last 24 hours) at 1/19/2022 0859  Last data filed at 1/19/2022 0557  Gross per 24 hour   Intake    Output 2200 ml   Net -2200 ml        I had a face to face encounter and independently examined this patient on 1/19/2022, as outlined below:  PHYSICAL EXAM:  General: WD, WN. Awake, not alert, cooperative, no acute distress    EENT:  EOMI. Anicteric sclerae. MMM  Resp:  CTA bilaterally, no wheezing or rales. No accessory muscle use  CV:  Regular  rhythm,  No edema, permcath left chest wall  GI:  Soft, Non distended, Non tender. +Bowel sounds  Neurologic:  Awake and alert, does not appear confused, normal speech,   Psych:   Good insight. Not anxious nor agitated  Skin:  No rashes. No jaundice, multiple tatoos    Reviewed most current lab test results and cultures  YES  Reviewed most current radiology test results   YES  Review and summation of old records today    NO  Reviewed patient's current orders and MAR    YES  PMH/SH reviewed - no change compared to H&P  ________________________________________________________________________  Care Plan discussed with:    Comments   Patient x    Family      RN x    Care Manager     Consultant                       x Multidiciplinary team rounds were held today with , nursing, pharmacist and clinical coordinator.   Patient's plan of care was discussed; medications were reviewed and discharge planning was addressed. ________________________________________________________________________  Total NON critical care TIME:  10  Minutes    Total CRITICAL CARE TIME Spent:   Minutes non procedure based      Comments   >50% of visit spent in counseling and coordination of care     ________________________________________________________________________  Blayne Keys MD     Procedures: see electronic medical records for all procedures/Xrays and details which were not copied into this note but were reviewed prior to creation of Plan. LABS:  I reviewed today's most current labs and imaging studies.   Pertinent labs include:  Recent Labs     01/18/22  0940 01/17/22  0250   WBC 5.0 6.7   HGB 10.0* 9.3*   HCT 30.4* 28.5*    224     Recent Labs     01/18/22 0940 01/17/22  0250   * 129*   K 5.6* 5.4*   CL 91* 95*   CO2 28 27   * 359*   BUN 62* 61*   CREA 5.15* 4.73*   CA 9.0 8.9   PHOS 5.6* 5.5*   ALB 3.1* 3.0*       Signed: Blayne Keys MD

## 2022-01-20 LAB
ALBUMIN SERPL-MCNC: 3.4 G/DL (ref 3.5–5)
ANION GAP SERPL CALC-SCNC: 9 MMOL/L (ref 5–15)
BUN SERPL-MCNC: 54 MG/DL (ref 6–20)
BUN/CREAT SERPL: 12 (ref 12–20)
CALCIUM SERPL-MCNC: 9.2 MG/DL (ref 8.5–10.1)
CHLORIDE SERPL-SCNC: 96 MMOL/L (ref 97–108)
CO2 SERPL-SCNC: 27 MMOL/L (ref 21–32)
CREAT SERPL-MCNC: 4.66 MG/DL (ref 0.7–1.3)
ERYTHROCYTE [DISTWIDTH] IN BLOOD BY AUTOMATED COUNT: 15.8 % (ref 11.5–14.5)
GLUCOSE BLD STRIP.AUTO-MCNC: 146 MG/DL (ref 65–117)
GLUCOSE BLD STRIP.AUTO-MCNC: 160 MG/DL (ref 65–117)
GLUCOSE BLD STRIP.AUTO-MCNC: 251 MG/DL (ref 65–117)
GLUCOSE BLD STRIP.AUTO-MCNC: 266 MG/DL (ref 65–117)
GLUCOSE BLD STRIP.AUTO-MCNC: 386 MG/DL (ref 65–117)
GLUCOSE BLD STRIP.AUTO-MCNC: 67 MG/DL (ref 65–117)
GLUCOSE BLD STRIP.AUTO-MCNC: 70 MG/DL (ref 65–117)
GLUCOSE BLD STRIP.AUTO-MCNC: 73 MG/DL (ref 65–117)
GLUCOSE SERPL-MCNC: 50 MG/DL (ref 65–100)
HCT VFR BLD AUTO: 32.2 % (ref 36.6–50.3)
HGB BLD-MCNC: 10.5 G/DL (ref 12.1–17)
MCH RBC QN AUTO: 30.9 PG (ref 26–34)
MCHC RBC AUTO-ENTMCNC: 32.6 G/DL (ref 30–36.5)
MCV RBC AUTO: 94.7 FL (ref 80–99)
NRBC # BLD: 0 K/UL (ref 0–0.01)
NRBC BLD-RTO: 0 PER 100 WBC
PHOSPHATE SERPL-MCNC: 5.8 MG/DL (ref 2.6–4.7)
PLATELET # BLD AUTO: 262 K/UL (ref 150–400)
PMV BLD AUTO: 10.7 FL (ref 8.9–12.9)
POTASSIUM SERPL-SCNC: 4.3 MMOL/L (ref 3.5–5.1)
RBC # BLD AUTO: 3.4 M/UL (ref 4.1–5.7)
SERVICE CMNT-IMP: ABNORMAL
SERVICE CMNT-IMP: NORMAL
SODIUM SERPL-SCNC: 132 MMOL/L (ref 136–145)
WBC # BLD AUTO: 6 K/UL (ref 4.1–11.1)

## 2022-01-20 PROCEDURE — 74011250636 HC RX REV CODE- 250/636: Performed by: INTERNAL MEDICINE

## 2022-01-20 PROCEDURE — 82962 GLUCOSE BLOOD TEST: CPT

## 2022-01-20 PROCEDURE — 74011250637 HC RX REV CODE- 250/637: Performed by: STUDENT IN AN ORGANIZED HEALTH CARE EDUCATION/TRAINING PROGRAM

## 2022-01-20 PROCEDURE — 74011636637 HC RX REV CODE- 636/637: Performed by: INTERNAL MEDICINE

## 2022-01-20 PROCEDURE — 36415 COLL VENOUS BLD VENIPUNCTURE: CPT

## 2022-01-20 PROCEDURE — 85027 COMPLETE CBC AUTOMATED: CPT

## 2022-01-20 PROCEDURE — 74011250637 HC RX REV CODE- 250/637: Performed by: PSYCHIATRY & NEUROLOGY

## 2022-01-20 PROCEDURE — 65660000000 HC RM CCU STEPDOWN

## 2022-01-20 PROCEDURE — 80069 RENAL FUNCTION PANEL: CPT

## 2022-01-20 PROCEDURE — 90935 HEMODIALYSIS ONE EVALUATION: CPT

## 2022-01-20 RX ORDER — INSULIN GLARGINE 100 [IU]/ML
20 INJECTION, SOLUTION SUBCUTANEOUS
Status: DISCONTINUED | OUTPATIENT
Start: 2022-01-20 | End: 2022-01-22 | Stop reason: HOSPADM

## 2022-01-20 RX ADMIN — LACOSAMIDE 100 MG: 50 TABLET, FILM COATED ORAL at 02:33

## 2022-01-20 RX ADMIN — HEPARIN SODIUM 1700 UNITS: 1000 INJECTION INTRAVENOUS; SUBCUTANEOUS at 10:55

## 2022-01-20 RX ADMIN — LACOSAMIDE 100 MG: 50 TABLET, FILM COATED ORAL at 17:51

## 2022-01-20 RX ADMIN — Medication 3 UNITS: at 17:51

## 2022-01-20 RX ADMIN — HEPARIN SODIUM 1600 UNITS: 1000 INJECTION INTRAVENOUS; SUBCUTANEOUS at 10:55

## 2022-01-20 RX ADMIN — LEVETIRACETAM 500 MG: 500 TABLET, FILM COATED ORAL at 02:33

## 2022-01-20 RX ADMIN — Medication 5 UNITS: at 16:30

## 2022-01-20 RX ADMIN — INSULIN GLARGINE 20 UNITS: 100 INJECTION, SOLUTION SUBCUTANEOUS at 23:53

## 2022-01-20 RX ADMIN — HEPARIN SODIUM 5000 UNITS: 5000 INJECTION INTRAVENOUS; SUBCUTANEOUS at 17:51

## 2022-01-20 RX ADMIN — HEPARIN SODIUM 5000 UNITS: 5000 INJECTION INTRAVENOUS; SUBCUTANEOUS at 02:34

## 2022-01-20 RX ADMIN — Medication 5 UNITS: at 12:48

## 2022-01-20 RX ADMIN — MELATONIN 3 MG: at 21:15

## 2022-01-20 RX ADMIN — Medication 6 UNITS: at 12:51

## 2022-01-20 RX ADMIN — LEVETIRACETAM 500 MG: 500 TABLET, FILM COATED ORAL at 12:48

## 2022-01-20 NOTE — PROGRESS NOTES
End of Shift Note    Bedside shift change report given to Tyree Jackson RN (oncoming nurse) by Magdy Romano RN (offgoing nurse). Report included the following information SBAR    Shift worked: Nights    Shift summary and any significant changes:    Pt had a BG of 403, NP notified. 4 units of Humalog ordered. PT asleep most of the night. No reports of pain. PT stated \" it feels better\". Pt off the floor for dialysis. Concerns for physician to address: None    Zone phone for oncoming shift:        Patient Information  Niya Bourgeois [address]  49 y.o.  12/28/2021 11:05 PM by Mina Garcia MD. Emilee Zhou was admitted from shelter     Problem List  Patient Active Problem List    Diagnosis Date Noted    Status epilepticus ([address] 12/28/2021    Aspiration pneumonia ([address] 12/28/2021    ESRD needing dialysis ([address] 12/28/2021    AMS (altered mental status) 12/28/2021    ATN (acute tubular necrosis) ([address] 12/25/2021    Interstitial nephritis determined by biopsy 12/25/2021    Thrombocytopenia ([address] 12/25/2021    Hypertension 12/25/2021    Sepsis ([address] 12/25/2021    Acute metabolic encephalopathy 93/33/8118    Hypoglycemia 11/25/2021    EUSEBIA (acute kidney injury) ([address] 50/93/2144    Metabolic acidemia 12/64/4222    Intractable nausea and vomiting 11/25/2021    Hyperglycemia due to type 2 diabetes mellitus ([address] 05/05/2019    Hyperosmolar non-ketotic state in patient with type 2 diabetes mellitus ([address] 12/16/2018     Past Medical History:   Diagnosis Date    Asthma     Diabetes ([address]     High cholesterol     Seizure ([address]        Core Measures:  CVA: No No  CHF:No No  PNA:No No    Activity:  Activity Level: Up with Assistance  Number times ambulated in hallways past shift: 0  Number of times OOB to chair past shift: 1    Cardiac:   Cardiac Monitoring: no  Cardiac Rhythm:  (regular)    Access:   Current line(s): HD access    Genitourinary:   Urinary status: NO  Urinary Catheter?  No Respiratory:   O2 Device: None (Room air)  Chronic home O2 use?: N/A  Incentive spirometer at bedside: N/A       GI:  Last Bowel Movement Date: 01/16/22  Current diet:  ADULT DIET Regular; 5 carb choices (75 gm/meal); Low Potassium (Less than 3000 mg/day); No OJ on trays please; send Crystal Light Lemonade with lunch and dinner daily; Butter Pecan Nepro with dinner meal  ADULT ORAL NUTRITION SUPPLEMENT Dinner; Renal Supplement  DIET ONE TIME MESSAGE  Passing flatus: YES  Tolerating current diet: NO       Pain Management:   Patient states pain is manageable on current regimen: Yes    Skin:  Loco Score: 22  Interventions: increase time out of bed, limit briefs and internal/external urinary devices    Patient Safety:  Fall Score:  Total Score: 2  Interventions: bed/chair alarm, gripper socks and pt to call before getting OOB  High Fall Risk: Yes    DVT prophylaxis:  DVT prophylaxis Med- Heparin  DVT prophylaxis SCD or ESVIN- No     Active Consults:  IP CONSULT TO NEUROLOGY  IP CONSULT TO NEPHROLOGY  IP CONSULT TO PSYCHIATRY    Length of Stay:  Expected LOS: 5d 9h  Actual LOS: 23  Discharge Plan: Pt will dc to home on Saturday after HD    Corrin Krabbe, RN

## 2022-01-20 NOTE — PROGRESS NOTES
Hospitalist Progress Note    NAME: Viv Baltazar   :  1965   MRN:  [id number]       Assessment / Plan:  Acute metabolic encephalopathy  Status epilepticus  History of seizure disorder  Aspiration pneumonia  back to baseline  MRI brainno acute abnormality. CT headno acute abnormality. Lumbar puncture doneCSF reveals no infectious etiology so far. CSF culture negative. CSF HSV negative   CSF meningitis panel negative. Out side hospital EEG indication triphasic slowing and focal 3hz right fronto temproal field siezure activety. EEG done here shows no seizure. No more seizure reported on the continuous EEG. Continue Keppra along with Vimpat. S/p course of Levaquin  Consulted psychiatryrecommend Haldol as needed. Mental status appears to be at baseline  --Not hypoxic  --No rehab needs. CM still working on safe discharge plan. Hopefully by this weekend     End-stage renal disease on dialysis  Anemia. --Garcia removed  --c/w Retacrit  --still no signs of renal recovery. Dialysis chair has been confirmed. Start date at clinic is Tuesday, . Patient will need to have dialysis this Saturday before discharge. DM2 with episodes of hypoglycemia  -Increase Lantus to 20 units. Increased Premeal NovoLog 5 units   -BG better, continue to follow. Patient eating double portions it seems    Migraine  Fioricet as needed. Hypertension  Hyperlipidemia  Polysubstance abuse  -c/w amlodipine      Body mass index is 22.55 kg/m². Estimated discharge date: January 10  Barriers: Renal function stabilize    Code status: Full  Prophylaxis: Lovenox  Recommended Disposition: SNF     Subjective:     Chief Complaint / Reason for Physician Visit  Follow up ESRD, DM, HTN      Objective:     VITALS:   Last 24hrs VS reviewed since prior progress note.  Most recent are:  Patient Vitals for the past 24 hrs:   Temp Pulse Resp BP SpO2   22 0341 98 °F (36.7 °C) 72 19 134/74 97 %   22 0006 97.8 °F (36.6 °C) (!) 58 17 (!) 140/76 96 %   01/19/22 2045 98.5 °F (36.9 °C) 69 17 (!) 149/74 95 %   01/19/22 1812 97.3 °F (36.3 °C) 70 16 (!) 159/84 97 %   01/19/22 1115 98.2 °F (36.8 °C) 68 16 (!) 150/91 97 %       Intake/Output Summary (Last 24 hours) at 1/20/2022 1276  Last data filed at 1/20/2022 0303  Gross per 24 hour   Intake    Output 800 ml   Net -800 ml        I had a face to face encounter and independently examined this patient on 1/20/2022, as outlined below:  PHYSICAL EXAM:  General: WD, WN. Awake, not alert, cooperative, no acute distress    EENT:  EOMI. Anicteric sclerae. MMM  Resp:  CTA bilaterally, no wheezing or rales. No accessory muscle use  CV:  Regular  rhythm,  No edema, permcath left chest wall  GI:  Soft, Non distended, Non tender. +Bowel sounds  Neurologic:  Awake and alert, does not appear confused, normal speech,   Psych:   Good insight. Not anxious nor agitated  Skin:  No rashes. No jaundice, multiple tatoos    Reviewed most current lab test results and cultures  YES  Reviewed most current radiology test results   YES  Review and summation of old records today    NO  Reviewed patient's current orders and MAR    YES  PMH/SH reviewed - no change compared to H&P  ________________________________________________________________________  Care Plan discussed with:    Comments   Patient x    Family      RN x    Care Manager     Consultant                       x Multidiciplinary team rounds were held today with , nursing, pharmacist and clinical coordinator. Patient's plan of care was discussed; medications were reviewed and discharge planning was addressed.      ________________________________________________________________________  Total NON critical care TIME:  10  Minutes    Total CRITICAL CARE TIME Spent:   Minutes non procedure based      Comments   >50% of visit spent in counseling and coordination of care ________________________________________________________________________  Escobar Casillas MD     Procedures: see electronic medical records for all procedures/Xrays and details which were not copied into this note but were reviewed prior to creation of Plan. LABS:  I reviewed today's most current labs and imaging studies.   Pertinent labs include:  Recent Labs     01/18/22  0940   WBC 5.0   HGB 10.0*   HCT 30.4*        Recent Labs     01/18/22  0940   *   K 5.6*   CL 91*   CO2 28   *   BUN 62*   CREA 5.15*   CA 9.0   PHOS 5.6*   ALB 3.1*       Signed: Escobar Casillas MD

## 2022-01-20 NOTE — PROCEDURES
Hemodialysis / [phone]    Vitals Pre Post Assessment Pre Post   BP BP: 125/72 (01/20/22 0750) 136/79 LOC A&Ox4 A&Ox4   HR Pulse (Heart Rate): 73 (01/20/22 0750) 73 Lungs clear clear   Resp Resp Rate: 18 (01/20/22 0750) 18 Cardiac Reg S1 S2 Reg S1 S2   Temp Temp: 98.1 °F (36.7 °C) (01/20/22 0750) 98.0, oral Skin CDI CDI   Weight  n/a n/a Edema none none   Tele status None none Pain 0 0     Orders   Duration: Start: 3629 End: 1050 Total: 3hr   Dialyzer: Dialyzer/Set Up Inspection: Demetrio Oliva (01/20/22 0750)   K Bath: Dialysate K (mEq/L): 2 (01/20/22 0750)   Ca Bath: Dialysate CA (mEq/L): 2.5 (01/20/22 0750)   Na: Dialysate NA (mEq/L): 138 (01/20/22 0750)   Bicarb: Dialysate HCO3 (mEq/L): 35 (01/20/22 0750)   Target Fluid Removal: Goal/Amount of Fluid to Remove (mL): 0 mL (01/20/22 0750)     Access   Type & Location: [facility]   Comments:  Dressing CDI, dated 1/18/22. No s/s of infection. Both lumens aspirate & flush well. Running well at .                                     Labs   HBsAg (Antigen) / date: Neg Ag 12/25/21                                              HBsAb (Antibody) / date: <10 Ab 12/25/21   Source: EPIC   Obtained/Reviewed  Critical Results Called HGB   Date Value Ref Range Status   01/20/2022 10.5 (L) 12.1 - 17.0 g/dL Final     Potassium   Date Value Ref Range Status   01/20/2022 4.3 3.5 - 5.1 mmol/L Final     Comment:     INVESTIGATED PER DELTA CHECK PROTOCOL     Calcium   Date Value Ref Range Status   01/20/2022 9.2 8.5 - 10.1 MG/DL Final     BUN   Date Value Ref Range Status   01/20/2022 54 (H) 6 - 20 MG/DL Final     Creatinine   Date Value Ref Range Status   01/20/2022 4.66 (H) 0.70 - 1.30 MG/DL Final        Meds Given   Name Dose Route   Heparin 1:1000 1.6ml & 1.7ml Dwell in CVC after HD               Adequacy / Fluid    Total Liters Process: 66.2L   Net Fluid Removed: 0ml      Comments   Time Out Done:   (Time) 4605   Admitting Diagnosis: Hypoglycemia/ AMS/ EUSEBIA   Consent obtained/signed: Informed Consent Verified: Yes (01/20/22 0750)   Machine / RO # Machine Number: [id number] (01/20/22 2112)   Primary Nurse Rpt Pre: Gera Ayers RN   Primary Nurse Rpt Post: Jackie Doyle RN   Pt Education: procedural   Care Plan: ongoing   Pts outpatient clinic: n/a     Tx Summary   Comments:         SBAR received from Primary RN. Pt arrived to HD suite A&Ox4. Consent signed & on file. 0750: Each catheter limb disinfected per p&p, caps removed, hubs disinfected per p&p. Each lumen aspirated for blood return and flushed with Normal Saline per policy. Labs drawn per request/ order. VSS. Dialysis Tx initiated. * correcting BG during this treatment. Labs indicated BG= 50mg/dL. After gingerale and иван crackers. BG = 146mg/dL. No other issues*    1050: Tx ended. VSS. Each dialysis catheter limb disinfected per p&p, all possible blood returned per p&p, and each dialysis hub disinfected per p&p. Each lumen flushed, post dialysis catheter Heparin dwell instilled per order, and caps applied. Bed locked and in the lowest position, call bell and belongings in reach. SBAR given to Primary, RN. Patient is stable at time of their departure. All Dialysis related medications have been reviewed.

## 2022-01-20 NOTE — PROGRESS NOTES
Nephrology Progress Note  Dorian Antonio     [email or web address]  Phone - [phone]   Patient: Alin Weaver    YOB: 1965        Date- 1/20/2022   Admit Date: 12/28/2021  CC: Follow up for eusebia  IMPRESSION & PLAN:    EUSEBIA  ON HD- AIN - on dialysis  with [facility] 1945 [facility], will keep him on TTS schedule   Interstitial nephritis determined by biopsy NSAID induced   Hypertension   Anemia   ACUTE Metabolic encephalopathy   Hx of seizures    PLAN-   Plan for hemodialysis today and keep him on TTS schedule   He has been accepted at [facility] for the outpatient chair on TTS shift.  Next HD will be on Saturday   He already has PermCath   Watch for renal recovery   Continue norvasc   epogen for anemia   Discussed with internal medicine team     Subjective: Interval History:   Seen and examined today on HD  Urine output better but very poor clearance      Objective:   Vitals:    01/20/22 1015 01/20/22 1030 01/20/22 1045 01/20/22 1050   BP: 139/85 134/79 132/80 136/79   Pulse: 70 68 65 67   Resp: 16 16 16 16   Temp:    98 °F (36.7 °C)   TempSrc:    Oral   SpO2:       Weight:       Height:          01/19 0701 - 01/20 0700  In: -   Out: 800 [Urine:800]  Last 3 Recorded Weights in this Encounter    01/06/22 2249 01/08/22 0645 01/10/22 0621   Weight: 53.3 kg (117 lb 9.6 oz) 53.6 kg (118 lb 1.6 oz) 55.9 kg (123 lb 4.8 oz)      Physical exam:   GEN:nad  NECK:  Supple, no thyromegaly  RESP: clear b/l, no  wheezing,   CVS: RRR,S1,S2   NEURO: non focal,  normal speech  EXT:no Edema +nt     Right permcath +      Chart reviewed. Pertinent Notes reviewed.      Data Review :  Recent Labs     01/20/22  0752 01/18/22  0940   * 127*   K 4.3 5.6*   CL 96* 91*   CO2 27 28   BUN 54* 62*   CREA 4.66* 5.15*   GLU 50* 394*   CA 9.2 9.0   PHOS 5.8* 5.6*     Recent Labs     01/20/22  0752 01/18/22  0940   WBC 6.0 5.0   HGB 10.5* 10.0*   HCT 32.2* 30.4*   PLT 262 262     No results for input(s): FE, TIBC, PSAT, FERR in the last 72 hours.    Medication list  reviewed  Current Facility-Administered Medications   Medication Dose Route Frequency    insulin glargine (LANTUS) injection 20 Units  20 Units SubCUTAneous QHS    insulin lispro (HUMALOG) injection 5 Units  5 Units SubCUTAneous TIDAC    epoetin jeff-epbx (RETACRIT) injection 4,000 Units  4,000 Units SubCUTAneous Q TUE, THU & SAT    heparin (porcine) injection 5,000 Units  5,000 Units SubCUTAneous Q12H    traMADoL (ULTRAM) tablet 50 mg  50 mg Oral Q6H PRN    mupirocin (BACTROBAN) 2 % ointment   Topical DAILY    melatonin tablet 3 mg  3 mg Oral QHS    thiamine mononitrate (B-1) tablet 100 mg  100 mg Oral DAILY    butalbital-acetaminophen-caffeine (FIORICET, ESGIC) -40 mg per tablet 1 Tablet  1 Tablet Oral Q6H PRN    amLODIPine (NORVASC) tablet 5 mg  5 mg Oral DAILY    heparin (porcine) 1,000 unit/mL injection 1,700 Units  1,700 Units InterCATHeter DIALYSIS PRN    And    heparin (porcine) 1,000 unit/mL injection 1,600 Units  1,600 Units InterCATHeter DIALYSIS PRN    levETIRAcetam (KEPPRA) tablet 500 mg  500 mg Oral BID    lacosamide (VIMPAT) tablet 100 mg  100 mg Oral Q12H    hydrALAZINE (APRESOLINE) 20 mg/mL injection 10 mg  10 mg IntraVENous Q6H PRN    ondansetron (ZOFRAN ODT) tablet 4 mg  4 mg Oral Q8H PRN    Or    ondansetron (ZOFRAN) injection 4 mg  4 mg IntraVENous Q6H PRN    acetaminophen (TYLENOL) tablet 650 mg  650 mg Oral Q4H PRN    Or    acetaminophen (TYLENOL) solution 650 mg  650 mg Per NG tube Q4H PRN    Or    acetaminophen (TYLENOL) suppository 650 mg  650 mg Rectal Q4H PRN    insulin lispro (HUMALOG) injection   SubCUTAneous AC&HS    glucose chewable tablet 16 g  4 Tablet Oral PRN    dextrose (D50W) injection syrg 12.5-25 g  12.5-25 g IntraVENous PRN    glucagon (GLUCAGEN) injection 1 mg  1 mg IntraMUSCular PRN          Teri Dejesus MD              [facility] [address] / [address], [address]  [address] [facility] [address], [address]  Phone - [phone]               Fax - [phone]

## 2022-01-20 NOTE — PROGRESS NOTES
Problem: Falls - Risk of  Goal: *Absence of Falls  Description: Document Claudia Counts Fall Risk and appropriate interventions in the flowsheet. Outcome: Progressing Towards Goal  Note: Fall Risk Interventions:  Mobility Interventions: Bed/chair exit alarm,Patient to call before getting OOB    Mentation Interventions: Bed/chair exit alarm    Medication Interventions: Bed/chair exit alarm    Elimination Interventions: Call light in reach              Problem: Patient Education: Go to Patient Education Activity  Goal: Patient/Family Education  Outcome: Progressing Towards Goal     Problem: Pressure Injury - Risk of  Goal: *Prevention of pressure injury  Description: Document Loco Scale and appropriate interventions in the flowsheet.   Outcome: Progressing Towards Goal  Note: Pressure Injury Interventions:  Sensory Interventions: Assess changes in LOC    Moisture Interventions: Absorbent underpads    Activity Interventions: Increase time out of bed    Mobility Interventions: HOB 30 degrees or less    Nutrition Interventions: Offer support with meals,snacks and hydration    Friction and Shear Interventions: Minimize layers                Problem: Patient Education: Go to Patient Education Activity  Goal: Patient/Family Education  Outcome: Progressing Towards Goal     Problem: Seizure Disorder (Adult)  Goal: *STG: Remains free of seizure activity  Outcome: Progressing Towards Goal  Goal: *STG: Maintains lab values within therapeutic range  Outcome: Progressing Towards Goal  Goal: *STG/LTG: Complies with medication therapy  Outcome: Progressing Towards Goal  Goal: *STG: Remains free of injury during seizure activity  Outcome: Progressing Towards Goal  Goal: *STG: Remains safe in hospital  Outcome: Progressing Towards Goal  Goal: Interventions  Outcome: Progressing Towards Goal     Problem: Patient Education: Go to Patient Education Activity  Goal: Patient/Family Education  Outcome: Progressing Towards Goal     Problem: Patient Education: Go to Patient Education Activity  Goal: Patient/Family Education  Outcome: Progressing Towards Goal     Problem: Patient Education: Go to Patient Education Activity  Goal: Patient/Family Education  Outcome: Progressing Towards Goal     Problem: Patient Education: Go to Patient Education Activity  Goal: Patient/Family Education  Outcome: Progressing Towards Goal

## 2022-01-20 NOTE — PROGRESS NOTES
Received notification from bedside RN about patient with regards to: , needs Lispro coverage order    Intervention given: Lispro 4 units SQ x 1 dose ordered

## 2022-01-21 LAB
GLUCOSE BLD STRIP.AUTO-MCNC: 150 MG/DL (ref 65–117)
GLUCOSE BLD STRIP.AUTO-MCNC: 153 MG/DL (ref 65–117)
GLUCOSE BLD STRIP.AUTO-MCNC: 178 MG/DL (ref 65–117)
GLUCOSE BLD STRIP.AUTO-MCNC: 191 MG/DL (ref 65–117)
SERVICE CMNT-IMP: ABNORMAL

## 2022-01-21 PROCEDURE — 74011250637 HC RX REV CODE- 250/637: Performed by: NURSE PRACTITIONER

## 2022-01-21 PROCEDURE — 65660000000 HC RM CCU STEPDOWN

## 2022-01-21 PROCEDURE — 74011250637 HC RX REV CODE- 250/637: Performed by: STUDENT IN AN ORGANIZED HEALTH CARE EDUCATION/TRAINING PROGRAM

## 2022-01-21 PROCEDURE — 82962 GLUCOSE BLOOD TEST: CPT

## 2022-01-21 PROCEDURE — 74011636637 HC RX REV CODE- 636/637: Performed by: INTERNAL MEDICINE

## 2022-01-21 PROCEDURE — 74011250636 HC RX REV CODE- 250/636: Performed by: INTERNAL MEDICINE

## 2022-01-21 PROCEDURE — 74011250637 HC RX REV CODE- 250/637: Performed by: PSYCHIATRY & NEUROLOGY

## 2022-01-21 RX ADMIN — Medication 5 UNITS: at 12:58

## 2022-01-21 RX ADMIN — MELATONIN 3 MG: at 21:53

## 2022-01-21 RX ADMIN — LEVETIRACETAM 500 MG: 500 TABLET, FILM COATED ORAL at 12:58

## 2022-01-21 RX ADMIN — INSULIN GLARGINE 20 UNITS: 100 INJECTION, SOLUTION SUBCUTANEOUS at 22:17

## 2022-01-21 RX ADMIN — TRAMADOL HYDROCHLORIDE 50 MG: 50 TABLET, COATED ORAL at 22:08

## 2022-01-21 RX ADMIN — AMLODIPINE BESYLATE 5 MG: 5 TABLET ORAL at 08:33

## 2022-01-21 RX ADMIN — HEPARIN SODIUM 5000 UNITS: 5000 INJECTION INTRAVENOUS; SUBCUTANEOUS at 02:10

## 2022-01-21 RX ADMIN — Medication 100 MG: at 08:33

## 2022-01-21 RX ADMIN — HEPARIN SODIUM 5000 UNITS: 5000 INJECTION INTRAVENOUS; SUBCUTANEOUS at 16:02

## 2022-01-21 RX ADMIN — LEVETIRACETAM 500 MG: 500 TABLET, FILM COATED ORAL at 02:10

## 2022-01-21 RX ADMIN — LACOSAMIDE 100 MG: 50 TABLET, FILM COATED ORAL at 02:10

## 2022-01-21 RX ADMIN — LACOSAMIDE 100 MG: 50 TABLET, FILM COATED ORAL at 13:39

## 2022-01-21 RX ADMIN — Medication 5 UNITS: at 08:32

## 2022-01-21 RX ADMIN — Medication 5 UNITS: at 17:39

## 2022-01-21 RX ADMIN — MUPIROCIN: 20 OINTMENT TOPICAL at 08:34

## 2022-01-21 NOTE — PROGRESS NOTES
Hospitalist Progress Note    NAME: Becka Duncan   :  1965   MRN:  [id number]       Assessment / Plan:  Acute metabolic encephalopathy  Status epilepticus  History of seizure disorder  Aspiration pneumonia  back to baseline  MRI brainno acute abnormality. CT headno acute abnormality. Lumbar puncture doneCSF reveals no infectious etiology so far. CSF culture negative. CSF HSV negative   CSF meningitis panel negative. Out side hospital EEG indication triphasic slowing and focal 3hz right fronto temproal field siezure activety. EEG done here shows no seizure. No more seizure reported on the continuous EEG. Continue Keppra along with Vimpat. S/p course of Levaquin  Consulted psychiatryrecommend Haldol as needed. Mental status appears to be at baseline  --Not hypoxic  --No rehab needs. Plan now is for patient to go back to live with his fiancee. Plan for DC after HD tomorrow. End-stage renal disease on dialysis  Anemia. --Garcia removed  --c/w Retacrit  --still no signs of renal recovery. Dialysis chair has been confirmed. Start date at clinic is Tuesday, . Patient will need to have dialysis this Saturday before discharge. DM2 with episodes of hypoglycemia  -Increase Lantus to 20 units. Increased Premeal NovoLog 5 units   -BG better, continue to follow. Patient eating double portions it seems    Migraine  Fioricet as needed. Hypertension  Hyperlipidemia  Polysubstance abuse  -c/w amlodipine      Body mass index is 22.26 kg/m². Estimated discharge date: January 10  Barriers: Renal function stabilize    Code status: Full  Prophylaxis: Lovenox  Recommended Disposition: SNF     Subjective:     Chief Complaint / Reason for Physician Visit  Follow up ESRD, DM, HTN      Objective:     VITALS:   Last 24hrs VS reviewed since prior progress note.  Most recent are:  Patient Vitals for the past 24 hrs:   Temp Pulse Resp BP SpO2   22 0831 97.5 °F (36.4 °C) 79 18 (!) 140/83 98 %   01/21/22 0323 97.5 °F (36.4 °C) 82 18 (!) 144/76 97 %   01/20/22 2358 97.7 °F (36.5 °C) 70 16 119/72 96 %   01/20/22 1937 98.1 °F (36.7 °C) 85 18 (!) 148/91 95 %   01/20/22 1514 98.4 °F (36.9 °C) 68 18 134/77 98 %       Intake/Output Summary (Last 24 hours) at 1/21/2022 1240  Last data filed at 1/21/2022 1100  Gross per 24 hour   Intake 240 ml   Output 2900 ml   Net -2660 ml        I had a face to face encounter and independently examined this patient on 1/21/2022, as outlined below:  PHYSICAL EXAM:  General: WD, WN. Awake, not alert, cooperative, no acute distress    EENT:  EOMI. Anicteric sclerae. MMM  Resp:  CTA bilaterally, no wheezing or rales. No accessory muscle use  CV:  Regular  rhythm,  No edema, permcath left chest wall  GI:  Soft, Non distended, Non tender. +Bowel sounds  Neurologic:  Awake and alert, does not appear confused, normal speech,   Psych:   Good insight. Not anxious nor agitated  Skin:  No rashes. No jaundice, multiple tatoos    Reviewed most current lab test results and cultures  YES  Reviewed most current radiology test results   YES  Review and summation of old records today    NO  Reviewed patient's current orders and MAR    YES  PMH/ reviewed - no change compared to H&P  ________________________________________________________________________  Care Plan discussed with:    Comments   Patient x    Family      RN x    Care Manager     Consultant                       x Multidiciplinary team rounds were held today with , nursing, pharmacist and clinical coordinator. Patient's plan of care was discussed; medications were reviewed and discharge planning was addressed.      ________________________________________________________________________  Total NON critical care TIME:  10  Minutes    Total CRITICAL CARE TIME Spent:   Minutes non procedure based      Comments   >50% of visit spent in counseling and coordination of care ________________________________________________________________________  Jessica Jane MD     Procedures: see electronic medical records for all procedures/Xrays and details which were not copied into this note but were reviewed prior to creation of Plan. LABS:  I reviewed today's most current labs and imaging studies.   Pertinent labs include:  Recent Labs     01/20/22 0752   WBC 6.0   HGB 10.5*   HCT 32.2*        Recent Labs     01/20/22 0752   *   K 4.3   CL 96*   CO2 27   GLU 50*   BUN 54*   CREA 4.66*   CA 9.2   PHOS 5.8*   ALB 3.4*       Signed: Jessica Jane MD

## 2022-01-21 NOTE — PROGRESS NOTES
End of Shift Note    Bedside shift change report given to [address] (oncoming nurse) by Aimee Olmstead RN (offgoing nurse). Report included the following information SBAR, Kardex, ED Summary, Procedure Summary, Intake/Output, MAR and Recent Results    Shift worked:  7a-7p     Shift summary and any significant changes:     Patient went to dialysis this morning. Blood sugar was 50 this morning so orange juice was given and it came back up to the 70s. When patient got back to the floor he ate and blood sugar went back up to the 300s. No complaints from patient. Concerns for physician to address:  none     Zone phone for oncoming shift:   [phone]       Activity:  Activity Level: Up with Assistance  Number times ambulated in hallways past shift: 1  Number of times OOB to chair past shift: 5    Cardiac:   Cardiac Monitoring: Yes      Cardiac Rhythm:  (regular)    Access:   Current line(s): port     Genitourinary:   Urinary status: voiding    Respiratory:   O2 Device: None (Room air)  Chronic home O2 use?: NO  Incentive spirometer at bedside: NO     GI:  Last Bowel Movement Date: 01/16/22  Current diet:  ADULT DIET Regular; 5 carb choices (75 gm/meal); Low Potassium (Less than 3000 mg/day); No OJ on trays please; send Crystal Light Lemonade with lunch and dinner daily; Butter Pecan Nepro with dinner meal  ADULT ORAL NUTRITION SUPPLEMENT Dinner; Renal Supplement  DIET ONE TIME MESSAGE  Passing flatus: YES  Tolerating current diet: YES       Pain Management:   Patient states pain is manageable on current regimen: YES    Skin:  Loco Score: 22  Interventions: increase time out of bed    Patient Safety:  Fall Score:  Total Score: 2  Interventions: gripper socks and pt to call before getting OOB  High Fall Risk: Yes    Length of Stay:  Expected LOS: 5d 9h  Actual LOS: [address] Hector Monson, RN

## 2022-01-21 NOTE — PROGRESS NOTES
Attempted to schedule NEW PATIENT hospital follow up PCP appointment. Unable to reach anyone, unable to leave voicemail. Pending patient discharge.  Khai Villanueva, Care Management Specialist

## 2022-01-21 NOTE — PROGRESS NOTES
Transition of Care Plan:     RUR:  19%     Disposition: return to prior address   NEW ESRD:  confirmed chair T/TH/SAT 12PM, Melanie Grimes   Follow up appointments: PCP, Nephrology   DME needed: needs a cane- will purchase after d/c   Transportation at [facility], requested for 1PM on 1/22/22   [place] or means to access home: N/A   IM Medicare Letter:   N/A  Is patient a BCPI-A Bundle:                      If yes, was Bundle Letter given?:    Is patient a [place] and connected with the [facility]?   N/A  If yes, was [place] transfer form completed and [facility] notified? Caregiver Contact:  Alexandra Lee  [phone]  Discharge Caregiver contacted prior to discharge? Yes    Chart reviewed. CM continuing to follow for discharge planning. Pt scheduled for discharge on Saturday, 1/22/22.  has contacted dialysis  and requested 1st shift dialysis for tomorrow.  has requested Medicaid transport ([phone]) ETA 1PM on 1/22/22. Reference#[id number]. Pt does not have clothing and staff will need to provide paper scrubs and extra  socks/blankets for discharge. Informed CCL of this. CM contacted [facility] and confirmed pt is scheduled to begin dialysis at [facility] on Tuesday, 1/25/22. CM will faxed d/c summary when available.  has scheduled Medicaid transportation for first week of dialysis, 1/25, 1/27, and 1/29. [facility]#[id number], #[id number], #[id number]. Pt will need to call Medicaid to update phone number on file after discharge. Pt will need to purchase a cane after discharge. CM assistant attempted to schedule new PCP appointment, however office was closed. Information provided so that pt can call to make appointment on Monday. No further CM needs identified. Ready for D/C from CM standpoint.     ALESSANDRO Starks   Care Manager, [facility]  [phone]

## 2022-01-21 NOTE — PROGRESS NOTES
Problem: Falls - Risk of  Goal: *Absence of Falls  Description: Document [place] Fall Risk and appropriate interventions in the flowsheet.   Outcome: Progressing Towards Goal  Note: Fall Risk Interventions:  Mobility Interventions: Assess mobility with egress test    Mentation Interventions: Adequate sleep, hydration, pain control    Medication Interventions: Teach patient to arise slowly    Elimination Interventions: Bed/chair exit alarm              Problem: Seizure Disorder (Adult)  Goal: *STG: Remains free of seizure activity  Outcome: Progressing Towards Goal  Goal: *STG: Remains safe in hospital  Outcome: Progressing Towards Goal

## 2022-01-21 NOTE — PROGRESS NOTES
Nephrology Progress Note  Dorian Antonio     [email or web address]. [email or web address]  Phone - [phone]   Patient: Jj Post    YOB: 1965        Date- 1/21/2022   Admit Date: 12/28/2021  CC: Follow up for eusebia  IMPRESSION & PLAN:    EUSEBIA  ON HD- AIN -previously on dialysis  with [facility], will keep him on TTS schedule   Interstitial nephritis determined by biopsy NSAID induced   Hypertension   Anemia   ACUTE Metabolic encephalopathy   Hx of seizures    PLAN-   Plan for hemodialysis tomorrow.  He has been accepted at [facility] for the outpatient chair on TTS shift.  He will be discharged to dialysis tomorrow   He already has PermCath   Watch for renal recovery   Continue norvasc   epogen for anemia   Discussed with internal medicine team     Subjective: Interval History:   Seen and examined today  Urine output better but very poor clearance  Possible discharge tomorrow after dialysis      Objective:   Vitals:    01/20/22 1937 01/20/22 2358 01/21/22 0323 01/21/22 0831   BP: (!) 148/91 119/72 (!) 144/76 (!) 140/83   Pulse: 85 70 82 79   Resp: 18 16 18 18   Temp: 98.1 °F (36.7 °C) 97.7 °F (36.5 °C) 97.5 °F (36.4 °C) 97.5 °F (36.4 °C)   TempSrc:       SpO2: 95% 96% 97% 98%   Weight:       Height:          01/20 0701 - 01/21 0700  In: 240 [P.O.:240]  Out: 3300 [Urine:3300]  Last 3 Recorded Weights in this Encounter    01/08/22 0645 01/10/22 0621 01/20/22 1905   Weight: 53.6 kg (118 lb 1.6 oz) 55.9 kg (123 lb 4.8 oz) 55.2 kg (121 lb 11.1 oz)      Physical exam:   GEN:nad  NECK:  Supple, no thyromegaly  RESP: clear b/l, no  wheezing,   CVS: RRR,S1,S2   NEURO: non focal,  normal speech  EXT:no Edema +nt     Right permcath +      Chart reviewed. Pertinent Notes reviewed.      Data Review :  Recent Labs     01/20/22  0752   *   K 4.3   CL 96*   CO2 27   BUN 54*   CREA 4.66*   GLU 50*   CA 9.2   PHOS 5.8*     Recent Labs     01/20/22  9680 WBC 6.0   HGB 10.5*   HCT 32.2*        No results for input(s): FE, TIBC, PSAT, FERR in the last 72 hours.    Medication list  reviewed  Current Facility-Administered Medications   Medication Dose Route Frequency    insulin glargine (LANTUS) injection 20 Units  20 Units SubCUTAneous QHS    insulin lispro (HUMALOG) injection 5 Units  5 Units SubCUTAneous TIDAC    epoetin jeff-epbx (RETACRIT) injection 4,000 Units  4,000 Units SubCUTAneous Q TUE, THU & SAT    heparin (porcine) injection 5,000 Units  5,000 Units SubCUTAneous Q12H    traMADoL (ULTRAM) tablet 50 mg  50 mg Oral Q6H PRN    mupirocin (BACTROBAN) 2 % ointment   Topical DAILY    melatonin tablet 3 mg  3 mg Oral QHS    thiamine mononitrate (B-1) tablet 100 mg  100 mg Oral DAILY    butalbital-acetaminophen-caffeine (FIORICET, ESGIC) -40 mg per tablet 1 Tablet  1 Tablet Oral Q6H PRN    amLODIPine (NORVASC) tablet 5 mg  5 mg Oral DAILY    heparin (porcine) 1,000 unit/mL injection 1,700 Units  1,700 Units InterCATHeter DIALYSIS PRN    And    heparin (porcine) 1,000 unit/mL injection 1,600 Units  1,600 Units InterCATHeter DIALYSIS PRN    levETIRAcetam (KEPPRA) tablet 500 mg  500 mg Oral BID    lacosamide (VIMPAT) tablet 100 mg  100 mg Oral Q12H    hydrALAZINE (APRESOLINE) 20 mg/mL injection 10 mg  10 mg IntraVENous Q6H PRN    ondansetron (ZOFRAN ODT) tablet 4 mg  4 mg Oral Q8H PRN    Or    ondansetron (ZOFRAN) injection 4 mg  4 mg IntraVENous Q6H PRN    acetaminophen (TYLENOL) tablet 650 mg  650 mg Oral Q4H PRN    Or    acetaminophen (TYLENOL) solution 650 mg  650 mg Per NG tube Q4H PRN    Or    acetaminophen (TYLENOL) suppository 650 mg  650 mg Rectal Q4H PRN    insulin lispro (HUMALOG) injection   SubCUTAneous AC&HS    glucose chewable tablet 16 g  4 Tablet Oral PRN    dextrose (D50W) injection syrg 12.5-25 g  12.5-25 g IntraVENous PRN    glucagon (GLUCAGEN) injection 1 mg  1 mg IntraMUSCular PRN          Cathye Donley, MD [facility]  Formerly [facility] / [facility], [address]  [place], [address]  Phone - [phone]               Fax - [phone]

## 2022-01-22 VITALS
DIASTOLIC BLOOD PRESSURE: 56 MMHG | WEIGHT: 121.69 LBS | BODY MASS INDEX: 22.39 KG/M2 | SYSTOLIC BLOOD PRESSURE: 145 MMHG | HEIGHT: 62 IN | RESPIRATION RATE: 18 BRPM | TEMPERATURE: 98.5 F | OXYGEN SATURATION: 97 % | HEART RATE: 86 BPM

## 2022-01-22 LAB
ALBUMIN SERPL-MCNC: 3.3 G/DL (ref 3.5–5)
ANION GAP SERPL CALC-SCNC: 6 MMOL/L (ref 5–15)
BASOPHILS # BLD: 0 K/UL (ref 0–0.1)
BASOPHILS NFR BLD: 1 % (ref 0–1)
BUN SERPL-MCNC: 38 MG/DL (ref 6–20)
BUN/CREAT SERPL: 13 (ref 12–20)
CALCIUM SERPL-MCNC: 8.7 MG/DL (ref 8.5–10.1)
CHLORIDE SERPL-SCNC: 100 MMOL/L (ref 97–108)
CO2 SERPL-SCNC: 29 MMOL/L (ref 21–32)
CREAT SERPL-MCNC: 2.85 MG/DL (ref 0.7–1.3)
DIFFERENTIAL METHOD BLD: ABNORMAL
EOSINOPHIL # BLD: 0.1 K/UL (ref 0–0.4)
EOSINOPHIL NFR BLD: 3 % (ref 0–7)
ERYTHROCYTE [DISTWIDTH] IN BLOOD BY AUTOMATED COUNT: 15.5 % (ref 11.5–14.5)
GLUCOSE BLD STRIP.AUTO-MCNC: 260 MG/DL (ref 65–117)
GLUCOSE SERPL-MCNC: 83 MG/DL (ref 65–100)
HBV SURFACE AB SER QL: NONREACTIVE
HBV SURFACE AB SER-ACNC: <3.1 MIU/ML
HBV SURFACE AG SER QL: 0.12 INDEX
HBV SURFACE AG SER QL: NEGATIVE
HCT VFR BLD AUTO: 31 % (ref 36.6–50.3)
HGB BLD-MCNC: 10.2 G/DL (ref 12.1–17)
IMM GRANULOCYTES # BLD AUTO: 0 K/UL (ref 0–0.04)
IMM GRANULOCYTES NFR BLD AUTO: 1 % (ref 0–0.5)
LYMPHOCYTES # BLD: 0.9 K/UL (ref 0.8–3.5)
LYMPHOCYTES NFR BLD: 24 % (ref 12–49)
MCH RBC QN AUTO: 31.5 PG (ref 26–34)
MCHC RBC AUTO-ENTMCNC: 32.9 G/DL (ref 30–36.5)
MCV RBC AUTO: 95.7 FL (ref 80–99)
MONOCYTES # BLD: 0.4 K/UL (ref 0–1)
MONOCYTES NFR BLD: 10 % (ref 5–13)
NEUTS SEG # BLD: 2.4 K/UL (ref 1.8–8)
NEUTS SEG NFR BLD: 61 % (ref 32–75)
NRBC # BLD: 0 K/UL (ref 0–0.01)
NRBC BLD-RTO: 0 PER 100 WBC
PHOSPHATE SERPL-MCNC: 2.9 MG/DL (ref 2.6–4.7)
PLATELET # BLD AUTO: 197 K/UL (ref 150–400)
PMV BLD AUTO: 10.6 FL (ref 8.9–12.9)
POTASSIUM SERPL-SCNC: 3.6 MMOL/L (ref 3.5–5.1)
RBC # BLD AUTO: 3.24 M/UL (ref 4.1–5.7)
SERVICE CMNT-IMP: ABNORMAL
SODIUM SERPL-SCNC: 135 MMOL/L (ref 136–145)
WBC # BLD AUTO: 3.9 K/UL (ref 4.1–11.1)

## 2022-01-22 PROCEDURE — 86706 HEP B SURFACE ANTIBODY: CPT

## 2022-01-22 PROCEDURE — 74011250637 HC RX REV CODE- 250/637: Performed by: STUDENT IN AN ORGANIZED HEALTH CARE EDUCATION/TRAINING PROGRAM

## 2022-01-22 PROCEDURE — 74011636637 HC RX REV CODE- 636/637: Performed by: INTERNAL MEDICINE

## 2022-01-22 PROCEDURE — 85025 COMPLETE CBC W/AUTO DIFF WBC: CPT

## 2022-01-22 PROCEDURE — 74011250636 HC RX REV CODE- 250/636: Performed by: INTERNAL MEDICINE

## 2022-01-22 PROCEDURE — 36415 COLL VENOUS BLD VENIPUNCTURE: CPT

## 2022-01-22 PROCEDURE — 80069 RENAL FUNCTION PANEL: CPT

## 2022-01-22 PROCEDURE — 74011250637 HC RX REV CODE- 250/637: Performed by: PSYCHIATRY & NEUROLOGY

## 2022-01-22 PROCEDURE — 87340 HEPATITIS B SURFACE AG IA: CPT

## 2022-01-22 PROCEDURE — 82962 GLUCOSE BLOOD TEST: CPT

## 2022-01-22 RX ORDER — INSULIN LISPRO 100 [IU]/ML
INJECTION, SOLUTION INTRAVENOUS; SUBCUTANEOUS
Qty: 30 EACH | Refills: 0 | Status: SHIPPED
Start: 2022-01-22 | End: 2022-01-22

## 2022-01-22 RX ORDER — LEVETIRACETAM 500 MG/1
500 TABLET ORAL 2 TIMES DAILY
Qty: 60 TABLET | Refills: 0 | Status: SHIPPED | OUTPATIENT
Start: 2022-01-22

## 2022-01-22 RX ORDER — AMLODIPINE BESYLATE 5 MG/1
5 TABLET ORAL DAILY
Qty: 30 TABLET | Refills: 0 | Status: SHIPPED | OUTPATIENT
Start: 2022-01-22

## 2022-01-22 RX ORDER — INSULIN GLARGINE 100 [IU]/ML
20 INJECTION, SOLUTION SUBCUTANEOUS
Qty: 30 ML | Refills: 0 | Status: SHIPPED | OUTPATIENT
Start: 2022-01-22

## 2022-01-22 RX ORDER — LACOSAMIDE 100 MG/1
100 TABLET ORAL EVERY 12 HOURS
Qty: 60 TABLET | Refills: 0 | Status: SHIPPED | OUTPATIENT
Start: 2022-01-22 | End: 2022-02-21

## 2022-01-22 RX ORDER — INSULIN LISPRO 100 [IU]/ML
INJECTION, SOLUTION INTRAVENOUS; SUBCUTANEOUS
Qty: 1 EACH | Refills: 0 | Status: SHIPPED
Start: 2022-01-22

## 2022-01-22 RX ADMIN — HEPARIN SODIUM 1700 UNITS: 1000 INJECTION INTRAVENOUS; SUBCUTANEOUS at 09:08

## 2022-01-22 RX ADMIN — LACOSAMIDE 100 MG: 50 TABLET, FILM COATED ORAL at 04:06

## 2022-01-22 RX ADMIN — BUTALBITAL, ACETAMINOPHEN, AND CAFFEINE 1 TABLET: 50; 325; 40 TABLET ORAL at 10:31

## 2022-01-22 RX ADMIN — Medication 3 UNITS: at 12:31

## 2022-01-22 RX ADMIN — LEVETIRACETAM 500 MG: 500 TABLET, FILM COATED ORAL at 12:31

## 2022-01-22 RX ADMIN — HEPARIN SODIUM 1600 UNITS: 1000 INJECTION INTRAVENOUS; SUBCUTANEOUS at 09:07

## 2022-01-22 RX ADMIN — AMLODIPINE BESYLATE 5 MG: 5 TABLET ORAL at 10:31

## 2022-01-22 RX ADMIN — Medication 100 MG: at 10:31

## 2022-01-22 RX ADMIN — LEVETIRACETAM 500 MG: 500 TABLET, FILM COATED ORAL at 04:06

## 2022-01-22 RX ADMIN — Medication 5 UNITS: at 12:31

## 2022-01-22 RX ADMIN — MUPIROCIN: 20 OINTMENT TOPICAL at 09:00

## 2022-01-22 RX ADMIN — HEPARIN SODIUM 5000 UNITS: 5000 INJECTION INTRAVENOUS; SUBCUTANEOUS at 04:06

## 2022-01-22 NOTE — PROGRESS NOTES
Nephrology Progress Note  Dorian Antonio     [email or web address]. [email or web address]  Phone - [phone]   Patient: Mohan Dalton    YOB: 1965        Date- 1/22/2022   Admit Date: 12/28/2021  CC: Follow up for eusebia  IMPRESSION & PLAN:    EUSEBIA  ON HD- AIN -previously on dialysis  with [facility], will keep him on TTS schedule   Interstitial nephritis determined by biopsy NSAID induced   Hypertension   Anemia   ACUTE Metabolic encephalopathy   Hx of seizures    PLAN-   Seen on HD suite, tolerating HD well   He has been accepted at [facility] for the outpatient chair on TTS shift.  Plan to DC home after HD   epogen for anemia     Subjective: Interval History:   Seen and examined in HD suite  Urine output better but very poor clearance  Possible discharge today after dialysis      Objective:   Vitals:    01/22/22 0715 01/22/22 0730 01/22/22 0745 01/22/22 0800   BP: (!) 153/106 (!) 146/105 (!) 145/101 (!) 158/106   Pulse: 76 78 79 83   Resp: 18 18 18 18   Temp:       TempSrc:       SpO2:       Weight:       Height:          01/21 0701 - 01/22 0700  In: -   Out: 550 [Urine:550]  Last 3 Recorded Weights in this Encounter    01/08/22 0645 01/10/22 0621 01/20/22 1905   Weight: 53.6 kg (118 lb 1.6 oz) 55.9 kg (123 lb 4.8 oz) 55.2 kg (121 lb 11.1 oz)      Physical exam:   GEN:nad  NECK:  Supple, no thyromegaly  RESP: clear b/l, no  wheezing,   CVS: RRR,S1,S2   NEURO: non focal,  normal speech  EXT:no Edema +nt     Right permcath +      Chart reviewed. Pertinent Notes reviewed. Data Review :  Recent Labs     01/20/22  0752   *   K 4.3   CL 96*   CO2 27   BUN 54*   CREA 4.66*   GLU 50*   CA 9.2   PHOS 5.8*     Recent Labs     01/22/22  0709 01/20/22  0752   WBC 3.9* 6.0   HGB 10.2* 10.5*   HCT 31.0* 32.2*    262     No results for input(s): FE, TIBC, PSAT, FERR in the last 72 hours.    Medication list  reviewed  Current Facility-Administered Medications   Medication Dose Route Frequency    insulin glargine (LANTUS) injection 20 Units  20 Units SubCUTAneous QHS    insulin lispro (HUMALOG) injection 5 Units  5 Units SubCUTAneous TIDAC    epoetin jeff-epbx (RETACRIT) injection 4,000 Units  4,000 Units SubCUTAneous Q TUE, THU & SAT    heparin (porcine) injection 5,000 Units  5,000 Units SubCUTAneous Q12H    traMADoL (ULTRAM) tablet 50 mg  50 mg Oral Q6H PRN    mupirocin (BACTROBAN) 2 % ointment   Topical DAILY    melatonin tablet 3 mg  3 mg Oral QHS    thiamine mononitrate (B-1) tablet 100 mg  100 mg Oral DAILY    butalbital-acetaminophen-caffeine (FIORICET, ESGIC) -40 mg per tablet 1 Tablet  1 Tablet Oral Q6H PRN    amLODIPine (NORVASC) tablet 5 mg  5 mg Oral DAILY    heparin (porcine) 1,000 unit/mL injection 1,700 Units  1,700 Units InterCATHeter DIALYSIS PRN    And    heparin (porcine) 1,000 unit/mL injection 1,600 Units  1,600 Units InterCATHeter DIALYSIS PRN    levETIRAcetam (KEPPRA) tablet 500 mg  500 mg Oral BID    lacosamide (VIMPAT) tablet 100 mg  100 mg Oral Q12H    hydrALAZINE (APRESOLINE) 20 mg/mL injection 10 mg  10 mg IntraVENous Q6H PRN    ondansetron (ZOFRAN ODT) tablet 4 mg  4 mg Oral Q8H PRN    Or    ondansetron (ZOFRAN) injection 4 mg  4 mg IntraVENous Q6H PRN    acetaminophen (TYLENOL) tablet 650 mg  650 mg Oral Q4H PRN    Or    acetaminophen (TYLENOL) solution 650 mg  650 mg Per NG tube Q4H PRN    Or    acetaminophen (TYLENOL) suppository 650 mg  650 mg Rectal Q4H PRN    insulin lispro (HUMALOG) injection   SubCUTAneous AC&HS    glucose chewable tablet 16 g  4 Tablet Oral PRN    dextrose (D50W) injection syrg 12.5-25 g  12.5-25 g IntraVENous PRN    glucagon (GLUCAGEN) injection 1 mg  1 mg IntraMUSCular PRN          Sonny Humphries MD              [facility]  Formerly [facility] / [facility]  [place], [address]  Phone - [phone]               Fax - [phone]

## 2022-01-22 NOTE — PROGRESS NOTES
Pt given discharge education. Pt had no telebox on and was discharged with the HD access in the Right chest. Reviewed AVS with pt, all questions answered. Pt taken downstairs via wheelchair where transportation was waiting to take him home.     Bryson Leger RN

## 2022-01-22 NOTE — PROCEDURES
[facility] / [phone]    Vitals Pre Post Assessment Pre Post   BP BP: (!) 134/93 (01/22/22 0625) 144/100 LOC Alert and oriented x 4 Alert and oriented x 4   HR Pulse (Heart Rate): 72 (01/22/22 0625) 82 Lungs Clear  Clear    Resp Resp Rate: 18 (01/22/22 0625) 18 Cardiac Blood pressure high. Will continue to monitor patient.   No cardiac distress reported    Temp Temp: 98.3 °F (36.8 °C) (01/22/22 0625) 98.3 Skin Warm  And dry  Warm and dry   Weight  n/a n/a Edema None none   Tele status None  None  Pain Pain Intensity 1: 6 (01/21/22 2209) 0     Orders   Duration: Start: 9502 End: 8576 Total: 3hrs    Dialyzer: Dialyzer/Set Up Inspection: Revaclear (01/22/22 0625)   [place]: Dialysate K (mEq/L): 2 (01/22/22 0625)   [place]: Dialysate CA (mEq/L): 2.5 (01/22/22 0625)   Na: Dialysate NA (mEq/L): 138 (01/22/22 0625)   Bicarb: Dialysate HCO3 (mEq/L): 35 (01/22/22 0625)   Target Fluid Removal: Goal/Amount of Fluid to Remove (mL): 0 mL (01/22/22 0625)     Access   Type & Location: Right Subclavian CVC   Comments:                                   No s/s of infection noted      Labs   HBsAg (Antigen) / date: Negative 12/25/2021                                HBsAb (Antibody) / date: Susceptible 12/25/2021   Source: [facility]    Obtained/Reviewed  Critical Results Called HGB   Date Value Ref Range Status   01/20/2022 10.5 (L) 12.1 - 17.0 g/dL Final     Potassium   Date Value Ref Range Status   01/20/2022 4.3 3.5 - 5.1 mmol/L Final     Comment:     INVESTIGATED PER DELTA CHECK PROTOCOL     Calcium   Date Value Ref Range Status   01/20/2022 9.2 8.5 - 10.1 MG/DL Final     BUN   Date Value Ref Range Status   01/20/2022 54 (H) 6 - 20 MG/DL Final     Creatinine   Date Value Ref Range Status   01/20/2022 4.66 (H) 0.70 - 1.30 MG/DL Final        Meds Given   Name Dose Route   Heparin  1.6/1.4ml cvc heparin lock               Adequacy / Fluid    Total Liters Process: 67.3   Net Fluid Removed: 1500ml      Comments   Time Out Done: (Time) 0620   Admitting Diagnosis: EUSEBIA   Consent obtained/signed: Informed Consent Verified: Yes (01/22/22 7809)   Machine / RO # Machine Number: [id number] (01/22/22 8249)   Primary Nurse Rpt Pre: Massiel Lemus RN   Primary Nurse Rpt Post: Tyler Esteves Rn    Pt Education: Procedural education   Care Plan: Continue with Hd care plan    Pts outpatient clinic: n/a     Tx Summary   Comments:           Right subclavian  CVC: Dressing CDI. No s/s of infection. Both lumens aspirate & flush well. Running well at . SBAR received from Primary RN. Pt arrived to HD suite A&Ox4. Consent signed & on file. 6742: Each catheter limb disinfected per p&p, caps removed, hubs disinfected per p&p. Each lumen aspirated for blood return and flushed with Normal Saline per policy. 2993: Tx ended. VSS. Each dialysis catheter limb disinfected per p&p, all possible blood returned per p&p, and each dialysis hub disinfected per p&p. Each lumen flushed, post dialysis catheter Heparin dwell instilled per order, and caps applied. SBAR given to Primary, RN. Patient is stable at time of their departure. All Dialysis related medications have been reviewed.

## 2022-01-22 NOTE — DISCHARGE SUMMARY
Hospitalist Discharge Summary     Patient ID:  Alannah Del Valle  [id number]  82 y.o.  1965  12/28/2021    PCP on record: Teri Newman MD    Admit date: 12/28/2021  Discharge date and time: 1/22/2022    DISCHARGE DIAGNOSIS:  Acute metabolic encephalopathy       CONSULTATIONS:  IP CONSULT TO NEUROLOGY  IP CONSULT TO NEPHROLOGY  IP CONSULT TO PSYCHIATRY    Excerpted HPI from H&P of Iván Solis MD:  Brittny Castro is a 49 y.o. male who initially presented to outside hospital with aspiration pneumonia, shock, altered mental status and was found to have epileptiform activity and currently on two AEDs Keppra and Vimpat, who presents as a transfer from outside facility for continuous altered mental status and unresponsiveness for unclear etiology thought to be in status epilepticus for  cont. EEG monitoring and EEG-based titration of medications    ______________________________________________________________________  DISCHARGE SUMMARY/HOSPITAL COURSE:  for full details see H&P, daily progress notes, labs, consult notes. Acute metabolic encephalopathy  Status epilepticus  History of seizure disorder  Aspiration pneumonia  back to baseline  MRI brainno acute abnormality. CT headno acute abnormality. Lumbar puncture doneCSF reveals no infectious etiology so far. CSF culture negative. CSF HSV negative   CSF meningitis panel negative. Out side hospital EEG indication triphasic slowing and focal 3hz right fronto temproal field siezure activety. EEG done here shows no seizure. No more seizure reported on the continuous EEG. Continue Keppra along with Vimpat. S/p course of Levaquin  Consulted psychiatryrecommend Haldol as needed. Mental status appears to be at baseline  --Not hypoxic  --No rehab needs. Plan now is for patient to go back to live with his fiancee. Plan for DC after HD tomorrow.       End-stage renal disease on dialysis  Anemia.      --Garcia removed  --c/w Retacrit  --still no signs of renal recovery. Dialysis chair has been confirmed. Start date at clinic is Tuesday, 1/25. Patient will need to have dialysis this Saturday before discharge.      DM2 with episodes of hypoglycemia  -Increase Lantus to 20 units. Increased Premeal NovoLog 5 units   -BG better, continue to follow. Patient eating double portions it seems     Migraine  Fioricet as needed.     Hypertension  Hyperlipidemia  Polysubstance abuse  -c/w amlodipine        Body mass index is 22.26 kg/m².     Estimated discharge date: January 10  Barriers: Renal function stabilize     Code status: Full  Prophylaxis: Lovenox  Re[facility] Disposition: [facility]      _______________________________________________________________________  Patient seen and examined by me on discharge day. Pertinent Findings:  Gen:    Not in distress  Chest: Clear lungs  CVS:   Regular rhythm. No edema  Abd:  Soft, not distended, not tender  Neuro:  Alert,   _______________________________________________________________________  DISCHARGE MEDICATIONS:   Current Discharge Medication List      CONTINUE these medications which have CHANGED    Details   levETIRAcetam (KEPPRA) 500 mg tablet Take 1 Tablet by mouth two (2) times a day. Qty: 60 Tablet, Refills: 0  Start date: 1/22/2022      epoetin jeff-epbx (RETACRIT) 10,000 unit/mL injection 1 mL by SubCUTAneous route Every Tues, Thur & Sat. Indications: anemia due to kidney failure  Qty: 3 mL, Refills: 0  Start date: 1/22/2022      amLODIPine (NORVASC) 5 mg tablet Take 1 Tablet by mouth daily. Qty: 30 Tablet, Refills: 0  Start date: 1/22/2022      insulin lispro (HUMALOG) 100 unit/mL injection 10 units before breakfast  12 units before lunch  15 units before dinner  Qty: 30 Each, Refills: 0  Start date: 1/22/2022         CONTINUE these medications which have NOT CHANGED    Details   insulin glargine (LANTUS) 100 unit/mL injection 3 Units by SubCUTAneous route daily.   Qty: 1 mL, Refills: 0  Start date: 12/28/2021      lacosamide (VIMPAT) IVPB 100 mg by IntraVENous route every twelve (12) hours every twelve (12) hours. Max Daily Amount: 200 mg. Qty: 1 Each, Refills: 0  Start date: 12/29/2021    Associated Diagnoses: Altered mental status, unspecified altered mental status type         STOP taking these medications       acyclovir 273 mg Comments:   Reason for Stopping:         levETIRAcetam in saline, iso-os, (KEPPRA) 1,000 mg/100 mL pgbk IVPB premix Comments:   Reason for Stopping:         levoFLOXacin (LEVAQUIN) 500 mg/100 mL Comments:   Reason for Stopping:         thiamine IVPB Comments:   Reason for Stopping:         atorvastatin (LIPITOR) 10 mg tablet Comments:   Reason for Stopping:         b complex-vitamin c-folic acid (NEPHROCAPS) 1 mg capsule Comments:   Reason for Stopping:         cyanocobalamin (VITAMIN B12) 2,500 mcg sublingual tablet Comments:   Reason for Stopping:         ferrous sulfate 325 mg (65 mg iron) tablet Comments:   Reason for Stopping:         folic acid (FOLVITE) 1 mg tablet Comments:   Reason for Stopping:         sevelamer carbonate (RENVELA) 800 mg tab tab Comments:   Reason for Stopping:         thiamine HCL (B-1) 100 mg tablet Comments:   Reason for Stopping:         senna-docusate (PERICOLACE) 8.6-50 mg per tablet Comments:   Reason for Stopping:         pantoprazole (PROTONIX) 40 mg tablet Comments:   Reason for Stopping:         bumetanide (BUMEX) 2 mg tablet Comments:   Reason for Stopping:         cloNIDine (CATAPRES) 0.3 mg/24 hr Comments:   Reason for Stopping:                 Patient Follow Up Instructions: Activity: Activity as tolerated  Diet: Diabetic Diet  Wound Care: As directed    Follow-up with PCP  in 5 days.   Follow-up tests/labs     Follow-up Information     Follow up With Specialties Details Why Darion Ch  Schedule an appointment as soon as possible for a visit in 5 days To schedule your NEW PATIENT PCP hospital follow up appointment. [address], [address]  [phone]        ________________________________________________________________    Risk of deterioration: Moderate    Condition at Discharge:  Stable  __________________________________________________________________    Disposition  Home with family, no needs    ____________________________________________________________________    Code Status: Full Code  ___________________________________________________________________      Total time in minutes spent coordinating this discharge (includes going over instructions, follow-up, prescriptions, and preparing report for sign off to her PCP) :  >30 minutes    Signed:   Jamila Payne MD

## 2022-01-22 NOTE — PROGRESS NOTES
Transition of Care Plan:     RUR:  19%     Disposition: return to prior address Home. -NEW ESRD:  confirmed chair T/TH/SAT 12PM, Melanie Grimes   -Unit CM contacted [facility] and confirmed pt is scheduled to begin dialysis at [place] on Tuesday, 1/25/22. CM will faxed d/c summary when available  --CM has scheduled Medicaid transportation for first week of dialysis, 1/25, 1/27, and 1/29. ECFU#[id number], #[id number], #[id number].     -Pt will need to call Medicaid to update phone number on file after discharge. Pt will need to purchase a cane after discharge.      -Pt does not have clothing and staff will need to provide paper scrubs and extra  socks/blankets for discharge. Informed CCL of this. Follow up appointments: PCP and Nephrology: Weekend CM unable to schedule appt    DME needed: needs a cane- will purchase after d/c   Transportation at [facility], requested for 1PM on 1/22/22   Medicaid transport Reference # [id number]   ([phone])   ETA 1PM on 1/22/22. (Medicaid has a three hour window)      Keys or means to access home: N/A   IM Medicare Letter:   N/A  Is patient a BCPI-A Bundle:                      If yes, was Bundle Letter given?:    Is patient a [place] and connected with the [facility]?   N/A  If yes, was [employer] transfer form completed and VA notified? Caregiver Contact:  Alexanrda Burr  [phone]  Discharge Caregiver contacted prior to discharge? Yes    NO further CM needs. Care Management Interventions  PCP Verified by CM: Yes  Palliative Care Criteria Met (RRAT>21 & CHF Dx)?: No  Mode of Transport at Discharge:  Other (see comment)  Transition of Care Consult (CM Consult): Discharge Planning  MyChart Signup: No  Discharge Durable Medical Equipment: No  Physical Therapy Consult: No  Occupational Therapy Consult: Yes  Speech Therapy Consult: No  Support Systems: Spouse/Significant Other,Child(chace)  Confirm Follow Up Transport: Family  The Plan for Transition of Care is Related to the Following Treatment Goals : Acute Rehab with return to family's home.   Discharge Location  Patient Expects to be Discharged to[de-identified] Home with family assistance  Read-Only, Retired: Discharge Placement: Rehab hospital/unit acute    Dennis Jaimes CM  Ext [phone]

## 2022-01-22 NOTE — PROGRESS NOTES
End of Shift Note     Bedside shift change report given to [facility] (oncoming nurse) by Powell Krabbe, RN (offgoing nurse). Report included the following information SBAR     Shift worked:  DAYS   Shift summary and any significant changes:     -none      Concerns for physician to address:  none   Zone phone for oncoming shift:   [phone]      Patient Information  Sydney Members [address]  49 y.o.  12/28/2021 11:05 PM by James Means MD. Edyta Trevino was admitted from [facility]      Problem List       Patient Active Problem List     Diagnosis Date Noted    Status epilepticus ([address] 12/28/2021    Aspiration pneumonia ([address] 12/28/2021    ESRD needing dialysis ([address] 12/28/2021    AMS (altered mental status) 12/28/2021    ATN (acute tubular necrosis) ([address] 12/25/2021    Interstitial nephritis determined by biopsy 12/25/2021    Thrombocytopenia ([address] 12/25/2021    Hypertension 12/25/2021    Sepsis ([address] 12/25/2021    Acute metabolic encephalopathy 18/57/3749    Hypoglycemia 11/25/2021    EUSEBIA (acute kidney injury) ([address] 63/38/3836    Metabolic acidemia 96/05/2614    Intractable nausea and vomiting 11/25/2021    Hyperglycemia due to type 2 diabetes mellitus ([address] 05/05/2019    Hyperosmolar non-ketotic state in patient with type 2 diabetes mellitus ([address] 12/16/2018           Past Medical History:   Diagnosis Date    Asthma      Diabetes ([address]      High cholesterol      Seizure (HCC)           Core Measures:  CVA: No No  CHF:No No  PNA:No No     Activity:  Activity Level:  Up with Assistance  Number times ambulated in hallways past shift: 0  Number of times OOB to chair past shift: 0     Cardiac:   Cardiac Monitoring: no      Cardiac Rhythm: n/a     Access:   Current line(s): none   with HD to right chest  Genitourinary:   Urinary status: NO  Urinary Catheter?     Respiratory:   O2 Device: None (Room air)  Chronic home O2 use?: N/A  Incentive spirometer at bedside: N/A     GI:  Last Bowel Movement Date: 01/21/22  Current diet:  ADULT DIET Easy to Chew  DIET ONE TIME MESSAGE  DIET ONE TIME MESSAGE  DIET ONE TIME MESSAGE  DIET ONE TIME MESSAGE  DIET ONE TIME MESSAGE  DIET ONE TIME MESSAGE  Passing flatus: YES  Tolerating current diet: NO     Pain Management:   Patient states pain is manageable on current regimen: N/A     Skin:  Loco Score: 20  Interventions: increase time out of bed, limit briefs and internal/external urinary devices    Patient Safety:  Fall Score: Total Score: 3  Interventions: bed/chair alarm, gripper socks and pt to call before getting OOB  High Fall Risk:  Yes     DVT prophylaxis:  DVT prophylaxis Med- No  DVT prophylaxis SCD or ESVIN- No      Active Consults:  IP CONSULT TO NEUROLOGY  IP CONSULT TO NEPHROLOGY  IP CONSULT TO PSYCHIATRY     Length of Stay:  Expected LOS: 4d 4h  Actual LOS: 13  Discharge Plan: 1/22/22     Cody Burkitt, RN

## 2022-01-22 NOTE — PROGRESS NOTES
Problem: Falls - Risk of  Goal: *Absence of Falls  Description: Document Claudia Counts Fall Risk and appropriate interventions in the flowsheet.   Note: Fall Risk Interventions:  Mobility Interventions: Bed/chair exit alarm,Patient to call before getting OOB    Mentation Interventions: Adequate sleep, hydration, pain control,Bed/chair exit alarm,More frequent rounding    Medication Interventions: Bed/chair exit alarm,Patient to call before getting OOB    Elimination Interventions: Call light in reach

## 2022-01-22 NOTE — DISCHARGE INSTRUCTIONS
Patient Education     Altered Mental Status: Care Instructions  Your Care Instructions  Altered mental status is a change in how well your brain is working. As a result, you may be confused, be less alert than usual, or act in odd ways. This may include seeing or hearing things that aren't really there (hallucinations). A mental status change has many possible causes. For example, it may be the result of an infection, an imbalance of chemicals in the body, or a chronic disease such as diabetes or COPD. It can also be caused by things such as a head injury, taking certain medicines, or using alcohol or drugs. The doctor may do tests to look for the cause. These tests may include urine tests, blood tests, and imaging tests such as a CT scan. Sometimes a clear cause isn't found. But tests can help the doctor rule out a serious cause of your symptoms. A change in mental status can be scary. But mental status will often return to normal when the cause is treated. So it is important to get any follow-up testing or treatment the doctor has suggested. The doctor has checked you carefully, but problems can develop later. If you notice any problems or new symptoms, get medical treatment right away. Follow-up care is a key part of your treatment and safety. Be sure to make and go to all appointments, and call your doctor if you are having problems. It's also a good idea to know your test results and keep a list of the medicines you take. How can you care for yourself at home? · Be safe with medicines. Take your medicines exactly as prescribed. Call your doctor if you think you are having a problem with your medicine. · Have another adult stay with you until you are better. This can help keep you safe. Ask that person to watch for signs that your mental status is getting worse. When should you call for help? Call 911 anytime you think you may need emergency care.  For example, call if:  · You passed out (lost consciousness). Call your doctor now or seek immediate medical care if:  · Your mental status is getting worse. · You have new symptoms, such as a fever, chills, or shortness of breath. · You do not feel safe. Watch closely for changes in your health, and be sure to contact your doctor if:  · You do not get better as expected. Where can you learn more? Go to [employer]  Enter J452 in the search box to learn more about \"Altered Mental Status: Care Instructions. \"   © 7919-3521 [employer]. Care instructions adapted under license by [facility] (which disclaims liability or warranty for this information). This care instruction is for use with your licensed healthcare professional. If you have questions about a medical condition or this instruction, always ask your healthcare professional. [address] any warranty or liability for your use of this information.   Content Version: 50.7.120609; Current as of: November 20, 2015